# Patient Record
Sex: MALE | Race: BLACK OR AFRICAN AMERICAN
[De-identification: names, ages, dates, MRNs, and addresses within clinical notes are randomized per-mention and may not be internally consistent; named-entity substitution may affect disease eponyms.]

---

## 2019-10-10 ENCOUNTER — HOSPITAL ENCOUNTER (INPATIENT)
Dept: HOSPITAL 84 - D.ER | Age: 44
LOS: 5 days | Discharge: HOME | DRG: 193 | End: 2019-10-15
Attending: INTERNAL MEDICINE | Admitting: INTERNAL MEDICINE
Payer: MEDICARE

## 2019-10-10 VITALS
BODY MASS INDEX: 25.19 KG/M2 | WEIGHT: 170.06 LBS | HEIGHT: 69 IN | BODY MASS INDEX: 25.19 KG/M2 | HEIGHT: 69 IN | WEIGHT: 170.06 LBS | BODY MASS INDEX: 25.19 KG/M2

## 2019-10-10 VITALS — DIASTOLIC BLOOD PRESSURE: 100 MMHG | SYSTOLIC BLOOD PRESSURE: 148 MMHG

## 2019-10-10 VITALS — SYSTOLIC BLOOD PRESSURE: 147 MMHG | DIASTOLIC BLOOD PRESSURE: 106 MMHG

## 2019-10-10 VITALS — SYSTOLIC BLOOD PRESSURE: 150 MMHG | DIASTOLIC BLOOD PRESSURE: 108 MMHG

## 2019-10-10 VITALS — SYSTOLIC BLOOD PRESSURE: 147 MMHG | DIASTOLIC BLOOD PRESSURE: 95 MMHG

## 2019-10-10 VITALS — DIASTOLIC BLOOD PRESSURE: 108 MMHG | SYSTOLIC BLOOD PRESSURE: 152 MMHG

## 2019-10-10 VITALS — DIASTOLIC BLOOD PRESSURE: 108 MMHG | SYSTOLIC BLOOD PRESSURE: 150 MMHG

## 2019-10-10 VITALS — SYSTOLIC BLOOD PRESSURE: 133 MMHG | DIASTOLIC BLOOD PRESSURE: 92 MMHG

## 2019-10-10 DIAGNOSIS — I50.43: ICD-10-CM

## 2019-10-10 DIAGNOSIS — I42.7: ICD-10-CM

## 2019-10-10 DIAGNOSIS — I10: ICD-10-CM

## 2019-10-10 DIAGNOSIS — J30.9: ICD-10-CM

## 2019-10-10 DIAGNOSIS — F15.10: ICD-10-CM

## 2019-10-10 DIAGNOSIS — J44.1: ICD-10-CM

## 2019-10-10 DIAGNOSIS — G93.1: ICD-10-CM

## 2019-10-10 DIAGNOSIS — F17.213: ICD-10-CM

## 2019-10-10 DIAGNOSIS — J44.0: ICD-10-CM

## 2019-10-10 DIAGNOSIS — J96.01: ICD-10-CM

## 2019-10-10 DIAGNOSIS — J18.9: Primary | ICD-10-CM

## 2019-10-10 DIAGNOSIS — F31.9: ICD-10-CM

## 2019-10-10 LAB
ALBUMIN SERPL-MCNC: 3.4 G/DL (ref 3.4–5)
ALP SERPL-CCNC: 86 U/L (ref 46–116)
ALT SERPL-CCNC: 70 U/L (ref 10–68)
ANION GAP SERPL CALC-SCNC: 12.3 MMOL/L (ref 8–16)
APTT BLD: 27.6 SECONDS (ref 22.8–39.4)
BASOPHILS NFR BLD AUTO: 0.2 % (ref 0–2)
BILIRUB SERPL-MCNC: 0.27 MG/DL (ref 0.2–1.3)
BUN SERPL-MCNC: 10 MG/DL (ref 7–18)
CALCIUM SERPL-MCNC: 8.5 MG/DL (ref 8.5–10.1)
CHLORIDE SERPL-SCNC: 107 MMOL/L (ref 98–107)
CK MB SERPL-MCNC: 1.4 U/L (ref 0–3.6)
CK SERPL-CCNC: 191 UL (ref 21–232)
CO2 SERPL-SCNC: 25.7 MMOL/L (ref 21–32)
CREAT SERPL-MCNC: 0.9 MG/DL (ref 0.6–1.3)
D DIMER PPP FEU-MCNC: 1.02 UG/MLFEU (ref 0.2–0.54)
EOSINOPHIL NFR BLD: 1.3 % (ref 0–7)
ERYTHROCYTE [DISTWIDTH] IN BLOOD BY AUTOMATED COUNT: 16.3 % (ref 11.5–14.5)
GLOBULIN SER-MCNC: 4.5 G/L
GLUCOSE SERPL-MCNC: 96 MG/DL (ref 74–106)
HCT VFR BLD CALC: 47.4 % (ref 42–54)
HGB BLD-MCNC: 15.8 G/DL (ref 13.5–17.5)
IMM GRANULOCYTES NFR BLD: 0.3 % (ref 0–5)
INR PPP: 1.04 (ref 0.85–1.17)
LYMPHOCYTES NFR BLD AUTO: 25 % (ref 15–50)
MCH RBC QN AUTO: 28.5 PG (ref 26–34)
MCHC RBC AUTO-ENTMCNC: 33.3 G/DL (ref 31–37)
MCV RBC: 85.4 FL (ref 80–100)
MONOCYTES NFR BLD: 5.8 % (ref 2–11)
NEUTROPHILS NFR BLD AUTO: 67.4 % (ref 40–80)
NT-PROBNP SERPL-MCNC: 1063 PG/ML (ref 0–125)
OSMOLALITY SERPL CALC.SUM OF ELEC: 279 MOSM/KG (ref 275–300)
PLATELET # BLD: 191 10X3/UL (ref 130–400)
PMV BLD AUTO: 10 FL (ref 7.4–10.4)
POTASSIUM SERPL-SCNC: 4 MMOL/L (ref 3.5–5.1)
PROT SERPL-MCNC: 7.9 G/DL (ref 6.4–8.2)
PROTHROMBIN TIME: 13.1 SECONDS (ref 11.6–15)
RBC # BLD AUTO: 5.55 10X6/UL (ref 4.2–6.1)
SODIUM SERPL-SCNC: 141 MMOL/L (ref 136–145)
TROPONIN I SERPL-MCNC: < 0.017 NG/ML (ref 0–0.06)
WBC # BLD AUTO: 6.1 10X3/UL (ref 4.8–10.8)

## 2019-10-10 NOTE — NUR
PT WITH O2 SATURATIONS @ 85% ON 4L NC HUMIDIFIED. RESPIRATORY KALIA NOTIFIED
AND PLACED PT ON HIGH FLOW NC @ 7L. DR POLO NOTIFIED OF PT STATUS.
TELEPHONE ORDERS RECD ARE TO OPBTAIN BLOOD GAS. RESPIRATORY KALIA NOTIFIED OF
NEED FOR BLOOD GAS. PT IS CURRENTLY SITTING IN BED RESPIRATIONS ARE EVEN AND
FAST. PT REQUESTS TO EAT DINNER BECAUSE "MY STOMACH IS EMPTY AND THAT IS WHY I
CAN'T BREATHE". WILL CONT TO MONITOR.

## 2019-10-10 NOTE — NUR
PT ARRIVES TO ROOM VIA WHEELCHAIR ESCORTED BY HOSPITAL STAFF. PT IS
HYPERTENSIVE. SEE VS FLOWSHEET. O2 VIA NC @ 4L. PT REPORTS PAIN TO "LUNGS". PT
DENIES PRESENCE OF N/V. PT REPORTS SLIGHT TROUBLE WITH TAKING DEEP BREATHS. PT
IS AAO X 4. RESPIRATIONS ARE EVEN AND UNLABORED. BROTHER ACCOMPANIES PT TO
ROOM. PIV TO LEFT HAND IS INFUSING WITHOUT DIFFICULTY. AZITHROMYCIN INFUSING
WITHOUT DIFFICULTY. BED IS IN THE LOWEST POSITION. CALL LIGHT AND BEDSIDE
TABLE ARE WITHIN REACH. SIDE RAILS X 2. WILL CONT TO MONITOR.

## 2019-10-10 NOTE — NUR
PT WITH REQUESTS TO LEAVE AMA. PT IS BECOMING ANXIOUS AND IRRITATED AT NC AND
O2. PT ASKS "WHAT DO I HAVE TO DO TO LEAVE HERE". PT ENCOURAGED TO STAY FOR
TREATMENT. PT IS COOPERATIVE AT THIS TIME. PAGE PLACED TO PATRICIA VILLAREAL
TO NOTIFY OF PT STATE AND REQUEST.

## 2019-10-10 NOTE — NUR
PT BROTHER PULLS THIS NURSE ASIDE OUTSIDE OF PT ROOM AND REPORTS THAT THE PT
DOES HAVE A HX OF SCHIZOPHRENIA BUT DOES NOT TAKE MEDICATION FOR PSYCHIATRIC
ILLNESS.

## 2019-10-11 VITALS — SYSTOLIC BLOOD PRESSURE: 125 MMHG | DIASTOLIC BLOOD PRESSURE: 74 MMHG

## 2019-10-11 VITALS — SYSTOLIC BLOOD PRESSURE: 110 MMHG | DIASTOLIC BLOOD PRESSURE: 71 MMHG

## 2019-10-11 VITALS — SYSTOLIC BLOOD PRESSURE: 131 MMHG | DIASTOLIC BLOOD PRESSURE: 83 MMHG

## 2019-10-11 VITALS — SYSTOLIC BLOOD PRESSURE: 135 MMHG | DIASTOLIC BLOOD PRESSURE: 95 MMHG

## 2019-10-11 LAB
ANION GAP SERPL CALC-SCNC: 12 MMOL/L (ref 8–16)
APPEARANCE UR: CLEAR
BASOPHILS NFR BLD AUTO: 0 % (ref 0–2)
BILIRUB SERPL-MCNC: NEGATIVE MG/DL
BUN SERPL-MCNC: 12 MG/DL (ref 7–18)
CALCIUM SERPL-MCNC: 8 MG/DL (ref 8.5–10.1)
CHLORIDE SERPL-SCNC: 107 MMOL/L (ref 98–107)
CO2 SERPL-SCNC: 24.3 MMOL/L (ref 21–32)
COLOR UR: YELLOW
CREAT SERPL-MCNC: 0.9 MG/DL (ref 0.6–1.3)
EOSINOPHIL NFR BLD: 0 % (ref 0–7)
ERYTHROCYTE [DISTWIDTH] IN BLOOD BY AUTOMATED COUNT: 15.9 % (ref 11.5–14.5)
GLUCOSE SERPL-MCNC: NEGATIVE MG/DL
HCT VFR BLD CALC: 44.9 % (ref 42–54)
HGB BLD-MCNC: 14.7 G/DL (ref 13.5–17.5)
IMM GRANULOCYTES NFR BLD: 0.3 % (ref 0–5)
KETONES UR STRIP-MCNC: NEGATIVE MG/DL
LYMPHOCYTES NFR BLD AUTO: 7.1 % (ref 15–50)
MAGNESIUM SERPL-MCNC: 2.4 MG/DL (ref 1.8–2.4)
MCH RBC QN AUTO: 27.5 PG (ref 26–34)
MCHC RBC AUTO-ENTMCNC: 32.7 G/DL (ref 31–37)
MCV RBC: 84.1 FL (ref 80–100)
MONOCYTES NFR BLD: 1.1 % (ref 2–11)
NEUTROPHILS NFR BLD AUTO: 91.5 % (ref 40–80)
NITRITE UR-MCNC: NEGATIVE MG/ML
NT-PROBNP SERPL-MCNC: 2539 PG/ML (ref 0–125)
OSMOLALITY SERPL CALC.SUM OF ELEC: 281 MOSM/KG (ref 275–300)
PH UR STRIP: 5 [PH] (ref 5–6)
PHOSPHATE SERPL-MCNC: 3.7 MG/DL (ref 2.5–4.9)
PLATELET # BLD: 188 10X3/UL (ref 130–400)
PMV BLD AUTO: 10.1 FL (ref 7.4–10.4)
POTASSIUM SERPL-SCNC: 4.3 MMOL/L (ref 3.5–5.1)
PROT UR-MCNC: NEGATIVE MG/DL
RBC # BLD AUTO: 5.34 10X6/UL (ref 4.2–6.1)
SODIUM SERPL-SCNC: 139 MMOL/L (ref 136–145)
SP GR UR STRIP: 1.01 (ref 1–1.02)
UROBILINOGEN UR-MCNC: NORMAL MG/DL
WBC # BLD AUTO: 9.8 10X3/UL (ref 4.8–10.8)

## 2019-10-11 NOTE — NUR
ALERT AND ORIENTED X3 WITH RESP EVEN AND UNLABORED WITH HI-FLOW 02 AT 7L N/C.
BREATH SOUNDS DIMINISHED TO BLQ POSTERIOR. UP ADLIB AND ENCOURAGED TO USE CALL
LIGHT FOR ASSIT.

## 2019-10-12 VITALS — DIASTOLIC BLOOD PRESSURE: 109 MMHG | SYSTOLIC BLOOD PRESSURE: 138 MMHG

## 2019-10-12 VITALS — SYSTOLIC BLOOD PRESSURE: 126 MMHG | DIASTOLIC BLOOD PRESSURE: 77 MMHG

## 2019-10-12 VITALS — DIASTOLIC BLOOD PRESSURE: 103 MMHG | SYSTOLIC BLOOD PRESSURE: 129 MMHG

## 2019-10-12 VITALS — SYSTOLIC BLOOD PRESSURE: 140 MMHG | DIASTOLIC BLOOD PRESSURE: 92 MMHG

## 2019-10-12 VITALS — SYSTOLIC BLOOD PRESSURE: 143 MMHG | DIASTOLIC BLOOD PRESSURE: 99 MMHG

## 2019-10-12 VITALS — SYSTOLIC BLOOD PRESSURE: 142 MMHG | DIASTOLIC BLOOD PRESSURE: 100 MMHG

## 2019-10-12 VITALS — SYSTOLIC BLOOD PRESSURE: 138 MMHG | DIASTOLIC BLOOD PRESSURE: 97 MMHG

## 2019-10-12 VITALS — SYSTOLIC BLOOD PRESSURE: 145 MMHG | DIASTOLIC BLOOD PRESSURE: 88 MMHG

## 2019-10-12 VITALS — DIASTOLIC BLOOD PRESSURE: 63 MMHG | SYSTOLIC BLOOD PRESSURE: 124 MMHG

## 2019-10-12 LAB
AMPHETAMINES UR QL SCN: NEGATIVE QUAL
ANION GAP SERPL CALC-SCNC: 12.2 MMOL/L (ref 8–16)
BARBITURATES UR QL SCN: NEGATIVE QUAL
BASOPHILS NFR BLD AUTO: 0 % (ref 0–2)
BENZODIAZ UR QL SCN: NEGATIVE QUAL
BUN SERPL-MCNC: 12 MG/DL (ref 7–18)
BZE UR QL SCN: NEGATIVE QUAL
CALCIUM SERPL-MCNC: 8.3 MG/DL (ref 8.5–10.1)
CANNABINOIDS UR QL SCN: NEGATIVE QUAL
CHLORIDE SERPL-SCNC: 109 MMOL/L (ref 98–107)
CO2 SERPL-SCNC: 25.9 MMOL/L (ref 21–32)
CREAT SERPL-MCNC: 1 MG/DL (ref 0.6–1.3)
EOSINOPHIL NFR BLD: 0 % (ref 0–7)
ERYTHROCYTE [DISTWIDTH] IN BLOOD BY AUTOMATED COUNT: 16.2 % (ref 11.5–14.5)
GLUCOSE SERPL-MCNC: 146 MG/DL (ref 74–106)
HCT VFR BLD CALC: 40 % (ref 42–54)
HGB BLD-MCNC: 12.9 G/DL (ref 13.5–17.5)
IMM GRANULOCYTES NFR BLD: 0.2 % (ref 0–5)
LYMPHOCYTES NFR BLD AUTO: 5.4 % (ref 15–50)
MAGNESIUM SERPL-MCNC: 2.5 MG/DL (ref 1.8–2.4)
MCH RBC QN AUTO: 27.4 PG (ref 26–34)
MCHC RBC AUTO-ENTMCNC: 32.3 G/DL (ref 31–37)
MCV RBC: 85.1 FL (ref 80–100)
MONOCYTES NFR BLD: 4.6 % (ref 2–11)
NEUTROPHILS NFR BLD AUTO: 89.8 % (ref 40–80)
OPIATES UR QL SCN: NEGATIVE QUAL
OSMOLALITY SERPL CALC.SUM OF ELEC: 287 MOSM/KG (ref 275–300)
PCP UR QL SCN: NEGATIVE QUAL
PHOSPHATE SERPL-MCNC: 3.3 MG/DL (ref 2.5–4.9)
PLATELET # BLD: 182 10X3/UL (ref 130–400)
PMV BLD AUTO: 10.2 FL (ref 7.4–10.4)
POTASSIUM SERPL-SCNC: 4.1 MMOL/L (ref 3.5–5.1)
RBC # BLD AUTO: 4.7 10X6/UL (ref 4.2–6.1)
SODIUM SERPL-SCNC: 143 MMOL/L (ref 136–145)
WBC # BLD AUTO: 17 10X3/UL (ref 4.8–10.8)

## 2019-10-12 NOTE — NUR
BED ALARM SOUNDING PT UP HAS PULLED GOWN OFF AND CM O2 SAT OFF. PT HAD
URINATED ON SELF ASSISTED WITH COMPLETE CHG BATH AND COMPLETE LINEN CHANGE. PT
STATES "I WANT TO BE DISCHARGED INFORMED OF HOW SICK HE HAD BEEN EARLIER WITH
FLUID AND NOT BEING ABLE TO BREATHE WELL WITH FLUID THAT NEED TO BE HERE AT
THIS TIME

## 2019-10-12 NOTE — NUR
ALERT AND ORIENTED X3 WITH ANXIEY. ATIVAN GIVEN FOR ANXIETY AND EFFECTIVE.
STATES WANTS TO SMOKE BUT INSTRUCTED THAT IS WHY HE IS WEARING A NICOTINE
PATCH. FAMILY HERE AT THIS TIME. HRRR WITH NO PERIPHERAL EDEMA NOTED. O2 3L
N/C WITH NON-PRODUCTIVE COUGH. BREATH SOUNDS DIMINISHED X2 TO BLQ POSTERIOR
WITH
DYSPNEA

## 2019-10-12 NOTE — NUR
REPORT CALLED TO JAMES REGARDING PT PRESENT CONDITION. BIPAP ON AT THIS TIME
WITH CALM DEMENOR AT THIS TIME. LASIX IV GIVEN. STABLE AT TIME OF TRANSFER.
BROTHER HERE AND AWARE OF SITUATION.

## 2019-10-12 NOTE — NUR
PT FREQUENTLY REMINDED TO  KEEP OXYGEN ON PER NASAL CANULA PULSE OX DROPS TO
80/S WHEN OFF. HI-FLOW OXYGEN AT 5L N/C. NON- PRODUCTIVE COUGH NOTED. O2 SAT
93 WITH OXYGEN ON.

## 2019-10-12 NOTE — NUR
DR. POLO CALLED REGARDING NON COMPLIANCE WITH KEEPING OXYGEN ON AND
AGITIATION WITH NEW ORDER NOTED. BROTHER HERE AT THIS TIME.

## 2019-10-12 NOTE — NUR
BED ALARM SOUNDING INTO CHECK ON PT HE IS ATTEMPTING TO GET OOB TO URINATE
REEDUCATED ON BED REST AND NEED TO USE URINAL NOT TO GET OOB WITHOUT ASSIST

## 2019-10-12 NOTE — NUR
REPORT RECEIVED INITIAL ASSESSMENT COMPLETE PT LETHARGIC BUT AWAKENS TO VERBAL
STIMULI ORIENTED FOLLOWS COMMANDS. RESP EVEN FAINT CRACKLES TO BASES. BIPAP
65% SEE RT FOR NOTES. CM READING ST ALARMS ON AND AUDIBLE. BED IN LOW POSITION
SIDE RAILS UP TIMES 3 FOR BED MOBILITY AND SAFETY CL IN REACH.PTS FAMILY AT
BEDSIDE BROTHER STATES "HE DOES DRINK ALOT EVERYDAY AND USES DRUGS CRACK AND
METH" PT HAS LONG MENTAL HEALTH HISTORY WITH NONCOMPLIANCE WITH MEDS

## 2019-10-13 VITALS — DIASTOLIC BLOOD PRESSURE: 114 MMHG | SYSTOLIC BLOOD PRESSURE: 151 MMHG

## 2019-10-13 VITALS — DIASTOLIC BLOOD PRESSURE: 108 MMHG | SYSTOLIC BLOOD PRESSURE: 149 MMHG

## 2019-10-13 VITALS — SYSTOLIC BLOOD PRESSURE: 111 MMHG | DIASTOLIC BLOOD PRESSURE: 67 MMHG

## 2019-10-13 VITALS — SYSTOLIC BLOOD PRESSURE: 139 MMHG | DIASTOLIC BLOOD PRESSURE: 100 MMHG

## 2019-10-13 VITALS — DIASTOLIC BLOOD PRESSURE: 98 MMHG | SYSTOLIC BLOOD PRESSURE: 142 MMHG

## 2019-10-13 VITALS — SYSTOLIC BLOOD PRESSURE: 106 MMHG | DIASTOLIC BLOOD PRESSURE: 73 MMHG

## 2019-10-13 VITALS — DIASTOLIC BLOOD PRESSURE: 88 MMHG | SYSTOLIC BLOOD PRESSURE: 117 MMHG

## 2019-10-13 VITALS — DIASTOLIC BLOOD PRESSURE: 113 MMHG | SYSTOLIC BLOOD PRESSURE: 140 MMHG

## 2019-10-13 VITALS — DIASTOLIC BLOOD PRESSURE: 100 MMHG | SYSTOLIC BLOOD PRESSURE: 143 MMHG

## 2019-10-13 VITALS — SYSTOLIC BLOOD PRESSURE: 87 MMHG | DIASTOLIC BLOOD PRESSURE: 56 MMHG

## 2019-10-13 VITALS — SYSTOLIC BLOOD PRESSURE: 123 MMHG | DIASTOLIC BLOOD PRESSURE: 94 MMHG

## 2019-10-13 VITALS — SYSTOLIC BLOOD PRESSURE: 121 MMHG | DIASTOLIC BLOOD PRESSURE: 85 MMHG

## 2019-10-13 VITALS — DIASTOLIC BLOOD PRESSURE: 114 MMHG | SYSTOLIC BLOOD PRESSURE: 160 MMHG

## 2019-10-13 VITALS — SYSTOLIC BLOOD PRESSURE: 117 MMHG | DIASTOLIC BLOOD PRESSURE: 85 MMHG

## 2019-10-13 VITALS — SYSTOLIC BLOOD PRESSURE: 101 MMHG | DIASTOLIC BLOOD PRESSURE: 63 MMHG

## 2019-10-13 VITALS — DIASTOLIC BLOOD PRESSURE: 99 MMHG | SYSTOLIC BLOOD PRESSURE: 144 MMHG

## 2019-10-13 VITALS — SYSTOLIC BLOOD PRESSURE: 125 MMHG | DIASTOLIC BLOOD PRESSURE: 83 MMHG

## 2019-10-13 VITALS — SYSTOLIC BLOOD PRESSURE: 137 MMHG | DIASTOLIC BLOOD PRESSURE: 90 MMHG

## 2019-10-13 VITALS — SYSTOLIC BLOOD PRESSURE: 95 MMHG | DIASTOLIC BLOOD PRESSURE: 63 MMHG

## 2019-10-13 VITALS — SYSTOLIC BLOOD PRESSURE: 147 MMHG | DIASTOLIC BLOOD PRESSURE: 96 MMHG

## 2019-10-13 VITALS — DIASTOLIC BLOOD PRESSURE: 95 MMHG | SYSTOLIC BLOOD PRESSURE: 130 MMHG

## 2019-10-13 VITALS — DIASTOLIC BLOOD PRESSURE: 104 MMHG | SYSTOLIC BLOOD PRESSURE: 152 MMHG

## 2019-10-13 VITALS — DIASTOLIC BLOOD PRESSURE: 78 MMHG | SYSTOLIC BLOOD PRESSURE: 113 MMHG

## 2019-10-13 VITALS — SYSTOLIC BLOOD PRESSURE: 119 MMHG | DIASTOLIC BLOOD PRESSURE: 70 MMHG

## 2019-10-13 LAB
ANION GAP SERPL CALC-SCNC: 13.1 MMOL/L (ref 8–16)
BASOPHILS NFR BLD AUTO: 0.1 % (ref 0–2)
BUN SERPL-MCNC: 12 MG/DL (ref 7–18)
CALCIUM SERPL-MCNC: 8.7 MG/DL (ref 8.5–10.1)
CHLORIDE SERPL-SCNC: 103 MMOL/L (ref 98–107)
CO2 SERPL-SCNC: 29 MMOL/L (ref 21–32)
CREAT SERPL-MCNC: 0.9 MG/DL (ref 0.6–1.3)
EOSINOPHIL NFR BLD: 0.1 % (ref 0–7)
ERYTHROCYTE [DISTWIDTH] IN BLOOD BY AUTOMATED COUNT: 16.6 % (ref 11.5–14.5)
GLUCOSE SERPL-MCNC: 115 MG/DL (ref 74–106)
HCT VFR BLD CALC: 44 % (ref 42–54)
HGB BLD-MCNC: 14.3 G/DL (ref 13.5–17.5)
IMM GRANULOCYTES NFR BLD: 0.3 % (ref 0–5)
LYMPHOCYTES NFR BLD AUTO: 14.1 % (ref 15–50)
MCH RBC QN AUTO: 27.7 PG (ref 26–34)
MCHC RBC AUTO-ENTMCNC: 32.5 G/DL (ref 31–37)
MCV RBC: 85.3 FL (ref 80–100)
MONOCYTES NFR BLD: 5.8 % (ref 2–11)
NEUTROPHILS NFR BLD AUTO: 79.6 % (ref 40–80)
OSMOLALITY SERPL CALC.SUM OF ELEC: 281 MOSM/KG (ref 275–300)
PLATELET # BLD: 205 10X3/UL (ref 130–400)
PMV BLD AUTO: 10.7 FL (ref 7.4–10.4)
POTASSIUM SERPL-SCNC: 4.1 MMOL/L (ref 3.5–5.1)
RBC # BLD AUTO: 5.16 10X6/UL (ref 4.2–6.1)
SODIUM SERPL-SCNC: 141 MMOL/L (ref 136–145)
WBC # BLD AUTO: 14.7 10X3/UL (ref 4.8–10.8)

## 2019-10-13 NOTE — NUR
REASSESSMENT COMPLETED. VSS. IV 22 GA TO RIGHT WRIST STARTED X 1 ATTEMPT,
POSITIVE BLOOD RETURN AND FLUSHES EASILY, DOBUTAMINE GTT STARTED AT 5
MCG/KG/MIN PER ORDER. PATIENT RESTING QUIETLY.

## 2019-10-13 NOTE — NUR
CONTINENT VOID VIA URINAL NOTED AT THIS TIME. 375ML YELLOW URINE NOTED. NO
ACUTE DISTRESS NOTED. VSS. WILL CONTINUE PLAN OF CARE.

## 2019-10-13 NOTE — NUR
RECEIVED BEDSIDE REPORT AND ASSUMED CARE OF PATIENT. PATIENT RESTING QUIETLY
WITH EYES CLOSED VSS, BROTHER AT BEDSIDE. IV 20 GA TO LEFT HAND, FLUSHES
EASILY WITH POSITIVE BLOOD RETURN, NSL. CM - , ST, BBS CLEAR AND EQUAL
DIMINISHED IN BASES, RR - 26, SPO2 - 92% ON 2 LPM O2 VIA NC. HEAD TO TOE
ASSESSMENT COMLETED.

## 2019-10-13 NOTE — NUR
REPORT RECEIVED, PT RESTING IN BED, NO ACUTE DISTRESS NOTED. AROUSES TO VOICE,
LETHARGIC, BUT OTHERWISE ALERT AND ORIENTED. PT ON 4L VIA NC, PIV IN RIGHT
HAND INFUSING, SEE FLOWSHEET, ASSESSMENT COMPLETED, SEE FLOWSHEET. NO ACUTE
DISTRESS NOTED AT THIS TIME.

## 2019-10-13 NOTE — NUR
REPORT RECIEVED ON PT AT THIS TIME. VSS. PT LYING IN BED RESTING, RESPIRATIONS
STEADY AND UNLABORED. AWAKENS EASILY WHEN SPOKEN TO. INDEPENDENT IN BED. WILL
CONTINUE PLAN OF CARE.

## 2019-10-13 NOTE — NUR
LYING IN BED RESTING AT THIS TIME. RESPIRATIONS STEADY AND UNLABORED RATE.
AWAKENS EASILY WHEN SPOKEN TO. WILL CONTINUE PLAN OF CARE.

## 2019-10-14 VITALS — SYSTOLIC BLOOD PRESSURE: 121 MMHG | DIASTOLIC BLOOD PRESSURE: 94 MMHG

## 2019-10-14 VITALS — SYSTOLIC BLOOD PRESSURE: 97 MMHG | DIASTOLIC BLOOD PRESSURE: 58 MMHG

## 2019-10-14 VITALS — SYSTOLIC BLOOD PRESSURE: 83 MMHG | DIASTOLIC BLOOD PRESSURE: 54 MMHG

## 2019-10-14 VITALS — DIASTOLIC BLOOD PRESSURE: 77 MMHG | SYSTOLIC BLOOD PRESSURE: 104 MMHG

## 2019-10-14 VITALS — DIASTOLIC BLOOD PRESSURE: 85 MMHG | SYSTOLIC BLOOD PRESSURE: 113 MMHG

## 2019-10-14 VITALS — DIASTOLIC BLOOD PRESSURE: 67 MMHG | SYSTOLIC BLOOD PRESSURE: 115 MMHG

## 2019-10-14 VITALS — SYSTOLIC BLOOD PRESSURE: 107 MMHG | DIASTOLIC BLOOD PRESSURE: 83 MMHG

## 2019-10-14 VITALS — DIASTOLIC BLOOD PRESSURE: 69 MMHG | SYSTOLIC BLOOD PRESSURE: 108 MMHG

## 2019-10-14 VITALS — SYSTOLIC BLOOD PRESSURE: 128 MMHG | DIASTOLIC BLOOD PRESSURE: 71 MMHG

## 2019-10-14 VITALS — DIASTOLIC BLOOD PRESSURE: 71 MMHG | SYSTOLIC BLOOD PRESSURE: 102 MMHG

## 2019-10-14 VITALS — SYSTOLIC BLOOD PRESSURE: 96 MMHG | DIASTOLIC BLOOD PRESSURE: 60 MMHG

## 2019-10-14 VITALS — SYSTOLIC BLOOD PRESSURE: 99 MMHG | DIASTOLIC BLOOD PRESSURE: 73 MMHG

## 2019-10-14 VITALS — DIASTOLIC BLOOD PRESSURE: 74 MMHG | SYSTOLIC BLOOD PRESSURE: 111 MMHG

## 2019-10-14 VITALS — SYSTOLIC BLOOD PRESSURE: 130 MMHG | DIASTOLIC BLOOD PRESSURE: 86 MMHG

## 2019-10-14 VITALS — SYSTOLIC BLOOD PRESSURE: 128 MMHG | DIASTOLIC BLOOD PRESSURE: 91 MMHG

## 2019-10-14 VITALS — DIASTOLIC BLOOD PRESSURE: 91 MMHG | SYSTOLIC BLOOD PRESSURE: 125 MMHG

## 2019-10-14 VITALS — SYSTOLIC BLOOD PRESSURE: 113 MMHG | DIASTOLIC BLOOD PRESSURE: 92 MMHG

## 2019-10-14 VITALS — SYSTOLIC BLOOD PRESSURE: 121 MMHG | DIASTOLIC BLOOD PRESSURE: 91 MMHG

## 2019-10-14 VITALS — DIASTOLIC BLOOD PRESSURE: 68 MMHG | SYSTOLIC BLOOD PRESSURE: 109 MMHG

## 2019-10-14 VITALS — SYSTOLIC BLOOD PRESSURE: 115 MMHG | DIASTOLIC BLOOD PRESSURE: 83 MMHG

## 2019-10-14 VITALS — DIASTOLIC BLOOD PRESSURE: 61 MMHG | SYSTOLIC BLOOD PRESSURE: 106 MMHG

## 2019-10-14 VITALS — DIASTOLIC BLOOD PRESSURE: 84 MMHG | SYSTOLIC BLOOD PRESSURE: 109 MMHG

## 2019-10-14 VITALS — SYSTOLIC BLOOD PRESSURE: 127 MMHG | DIASTOLIC BLOOD PRESSURE: 98 MMHG

## 2019-10-14 VITALS — DIASTOLIC BLOOD PRESSURE: 93 MMHG | SYSTOLIC BLOOD PRESSURE: 109 MMHG

## 2019-10-14 LAB
ANION GAP SERPL CALC-SCNC: 11.2 MMOL/L (ref 8–16)
BASOPHILS NFR BLD AUTO: 0.1 % (ref 0–2)
BUN SERPL-MCNC: 18 MG/DL (ref 7–18)
CALCIUM SERPL-MCNC: 8.8 MG/DL (ref 8.5–10.1)
CHLORIDE SERPL-SCNC: 100 MMOL/L (ref 98–107)
CO2 SERPL-SCNC: 28.9 MMOL/L (ref 21–32)
CREAT SERPL-MCNC: 1 MG/DL (ref 0.6–1.3)
EOSINOPHIL NFR BLD: 2 % (ref 0–7)
ERYTHROCYTE [DISTWIDTH] IN BLOOD BY AUTOMATED COUNT: 16.2 % (ref 11.5–14.5)
GLUCOSE SERPL-MCNC: 116 MG/DL (ref 74–106)
HCT VFR BLD CALC: 44.9 % (ref 42–54)
HGB BLD-MCNC: 15 G/DL (ref 13.5–17.5)
IMM GRANULOCYTES NFR BLD: 0.3 % (ref 0–5)
LYMPHOCYTES NFR BLD AUTO: 30.7 % (ref 15–50)
MAGNESIUM SERPL-MCNC: 2.2 MG/DL (ref 1.8–2.4)
MCH RBC QN AUTO: 28.1 PG (ref 26–34)
MCHC RBC AUTO-ENTMCNC: 33.4 G/DL (ref 31–37)
MCV RBC: 84.1 FL (ref 80–100)
MONOCYTES NFR BLD: 9.8 % (ref 2–11)
NEUTROPHILS NFR BLD AUTO: 57.1 % (ref 40–80)
OSMOLALITY SERPL CALC.SUM OF ELEC: 276 MOSM/KG (ref 275–300)
PHOSPHATE SERPL-MCNC: 4.5 MG/DL (ref 2.5–4.9)
PLATELET # BLD: 196 10X3/UL (ref 130–400)
PMV BLD AUTO: 10.7 FL (ref 7.4–10.4)
POTASSIUM SERPL-SCNC: 3.1 MMOL/L (ref 3.5–5.1)
RBC # BLD AUTO: 5.34 10X6/UL (ref 4.2–6.1)
SODIUM SERPL-SCNC: 137 MMOL/L (ref 136–145)
TROPONIN I SERPL-MCNC: < 0.017 NG/ML (ref 0–0.06)
WBC # BLD AUTO: 7.4 10X3/UL (ref 4.8–10.8)

## 2019-10-14 NOTE — MORECARE
CASE MANAGEMENT DISCHARGE SUMMARY
 
 
PATIENT: RYANNE BOGGS                   UNIT: [id number]
ACCOUNT#: [id number]                       ADM DATE: 10/10/19
AGE: 43     : [birth year]  SEX: M            ROOM/BED: D.2311    
AUTHOR: CUONG,DOC                             PHYSICIAN:                               
 
REFERRING PHYSICIAN: JAMIE POLO MD               
DATE OF SERVICE: 10/14/19
Discharge Plan
 
 
Patient Name: RYANNE BOGGS
Facility: [facility]:[place]
Encounter #: [id number]
Medical Record #: [id number]
: 1975
Planned Disposition: Home
Anticipated Discharge Date: 
 
Discharge Date: 
Expected LOS: 
Initial Reviewer: [email or web address]
Initial Review Date: 10/14/2019
Generated: 10/14/19   4:03 pm 
Comments
 
DCP- Discharge Planning
 
Updated by [email or web address]: Cheryl Espinoza on 10/14/19   1:56 pm CT
Patient Name: RYANNE BOGGS                                     
Admission Status: ER   
Accout number: [id number]                              
Admission Date: 10-   
: 1975                                                        
Admission Diagnosis:   
Attending: JAMIE POLO                                                
Current LOS:  4   
  
Anticipated DC Date:    
Planned Disposition: Home   
Primary Insurance: MEDICARE A & B   
  
  
Discharge Planning Comments: CM met with patient at bedside after explaining 
CM role and obtaining verbal consent. Patient lives at home alone where he is 
independent with his care and plans to return there upon discharge.  Patient 
feels this would be a safe discharge.  CM discussed availability / needs of 
home health and medical equipment.  Patient denies any discharge needs at 
 
this time. Patient states he will have his family drive him home upon 
discharge.  CM will continue to follow and assist as needed with discharge 
planning / needs.  
  
  
  
  
  
  
: Cheryl Espinoza
 DCPIA - Discharge Planning Initial Assessment
 
Updated by [id number]: Cheryl Espinoza on 10/14/19   2:54 pm
*  Is the patient Alert and Oriented?
Yes
*  How many steps to enter\exit or inside your home?
 
*  PCP
no PCP
*  Pharmacy
[facility]
*  Preadmission Environment
Home Alone
*  ADLs
Independent
*  Equipment
None
*  List name and contact numbers for known caregivers / representatives who 
currently or will assist patient after discharge:
KALIA ALVAREZ - [phone]
*  Verbal permission to speak to the caregivers and representatives has been 
obtained from the patient.
N/A
*  Community resources currently utilized
None
*  Additional services required to return to the preadmission environment?
No
*  Can the patient safely return to the preadmission environment?
Yes
*  Has this patient been hospitalized within the prior 30 days at any 
hospital?
No
 
 
 
 
 
Coverage Notice
 
Reviewer: [email or web address] Leigh Ann Boyle
 
Notice Issued Date-Time: 10/11/2019  16:53
 
Notice Type: IM Discharge Notice
 
Notice Delivered To: Patient
Relationship to Patient: 
Representative Name: 
 
Delivery Method: HAND - Hand Delivered
Roseann Days:
Prior Verbal Notification: 
 
Recipient Understood Notice: Yes
Recipient Signature: Yes
Med Rec Note Co-signed by Attending:
 
Coverage Notice Comment:  
Patient Name: RYANNE BOGGS
Encounter #: [id number]
Page [id number]
 
 
 
 
 
Electronically Signed by BRYCE HUTCHINS on 10/14/19 at 1503
 
 
 
 
 
 
**All edits/amendments must be made on the electronic document**
 
DICTATION DATE: 10/14/19 1503     : SARWAT  10/14/19 1503     
RPT#: [id number]                                DC DATE:        
                                               STATUS: ADM IN  
[facility]
[address] [place] [address]
***END OF REPORT***

## 2019-10-14 NOTE — EC
PATIENT:RYANNE BOGGS               DATE OF SERVICE: 10/10/19
SEX: M                                  MEDICAL RECORD: [id number]
DATE OF BIRTH: [birth year]                        LOCATION:[facility]
AGE OF PATIENT: 43                             ADMISSION DATE: 10/10/19
 
REFERRING PHYSICIAN:                               
 
INTERPRETING PHYSICIAN: BROOK CHANEL MD             
 
 
 
                             ECHOCARDIOGRAM REPORT
  ECHO CHARGES 4               ECHO COMPLETE                 Date: 10/11/19
 
 
 
CLINICAL DIAGNOSIS: ELEVATED BNP/TACHYCARDIA/     
                    RESP.FAILURE/DRUG USE         
                         ECHOCARDIOGRAPHIC MEASUREMENTS
      (adult normal given)
   AC root (d.<3.7cm) 3.3  cm   LV Septum d (<1.2 cm> 1.5  cm
      Valve Excursion 2.0  cm     LV Septum (systole) 1.7  cm
Left Atria (s.<4.0cm> 4.2  cm          LVPW d(<1.2cm) 1.2  cm
        RV (d.<2.3cm) 1.7  cm           LVPW (sytole) 1.9  cm
  LV diastole(<5.6CM) 6.9  cm       MV E-F(>70mm/sec)      cm
           LV systole 5.1  cm           LVOT Diameter 1.9  cm
       MV exc.(>10mm)      cm
Est.ejection fraction (50-75%)     %
 
   DOPPLER:
     LVIT      cm/sec A 60.0 cm/sec E 144   cm/sec
       LA      cm/sec      RVSP 26.4 mmHg
     LVOT 109  cm/sec   AOP1/2T      m/s
  Asc. Ao 146  cm/sec
     RVOT 54.0 cm/sec
       RA      cm/sec
       PA 80.0 cm/sec
 AV Gradient Peak 8.5  mmHg  AV Mean 4.7  mmHg  AV Area 2.1  cm
 MV Gradient Peak 8.3  mmHg  MV Mean 3.2  mmHg  MV Area      cm
   COMMENTS:                                              
 
 
 Cardiac Sonographer: 1               AYESHA DE LEONOE            
      Cardiologist: 3          Dr. Bennett             
             TAPE# PACS           
                                       Pericardial Effusion N                        
 
 
DATE OF SERVICE:  
 
PROCEDURE:  Echocardiogram.
 
FINDINGS:
1.  Left ventricular chamber size is dilated.  Left ventricular systolic
function is markedly reduced at 20%.
2.  Left atrium, right atrium, and right ventricular chamber sizes are dilated
giving 4-chamber dilatation.
3.  Valvular structures have normal structure and motion.
 
 
 
ECHOCARDIOGRAM REPORT                          [id number]    RYANNE BOGGS       
 
 
4.  Doppler interrogation reveals moderate mitral regurgitation, mild tricuspid
regurgitation, no other valvular insufficiency or stenosis.  Pulmonary systolic
pressure is estimated at 26 mmHg.
5.  No evidence of pericardial effusion or left ventricular thrombus.
 
TRANSINT:[id number] Voice Confirmation ID: [id number] DOCUMENT ID: [id number]
                                           
                                           BROOK CHANEL MD             
 
 
 
Electronically Signed by BROOK CHANEL on 10/14/19 at 1110
 
 
 
 
 
 
 
 
 
 
 
 
 
 
 
 
 
 
 
 
 
 
 
 
 
 
 
 
 
 
 
 
 
 
CC:                                                             [id number]
DICTATION DATE: 10/11/19 1316     :     10/11/19 1340      ADM IN  
                                                                              
[facility]                                          
[address] [id number]

## 2019-10-14 NOTE — NUR
REASSESSMENT COMPLETE PER FLOW SHEET. VSS. NO NEW CHANGES PT RESTING
COMFORTABLY WILL CONTINUE TO MONITOR

## 2019-10-14 NOTE — NUR
REPORT RECEIVED. ASSESSMENT COMPLETE PER FLOW SHEET. VSS. PT RSTING
COMFORTABLY DENIES NEEDS WILL CONTINUE TO MONITOR

## 2019-10-14 NOTE — NUR
PT RESTING IN BED, ON 1L O2 VIA NC, REPORTS "ITCHING" ON ARMS AND ON BODY, NO
SIGNS OF RASH/REDNESS. WILL CONTINUE TO MONITOR.

## 2019-10-14 NOTE — NUR
BEDSIDE REPORT AND SHIFT ASSESSMENT COMPLETE. VSS, NO SIGNS OF ACUTE DISTRESS
NOTED. DENIES NEEDS AT THIS TIME, CALL LIGHT IN REACH. WILL MONITOR.

## 2019-10-14 NOTE — NUR
REASSESSMENT COMPLETE, SEE FLOWSHEET. VSS, NO SIGNS OF ACUTE DISTRESS NOTED.
MEDS GIVEN PER MAR. PT DENIES ANY NEEDS AT THIS TIME, WILL MONITOR.

## 2019-10-15 VITALS — DIASTOLIC BLOOD PRESSURE: 96 MMHG | SYSTOLIC BLOOD PRESSURE: 124 MMHG

## 2019-10-15 VITALS — SYSTOLIC BLOOD PRESSURE: 112 MMHG | DIASTOLIC BLOOD PRESSURE: 79 MMHG

## 2019-10-15 VITALS — DIASTOLIC BLOOD PRESSURE: 75 MMHG | SYSTOLIC BLOOD PRESSURE: 107 MMHG

## 2019-10-15 VITALS — SYSTOLIC BLOOD PRESSURE: 99 MMHG | DIASTOLIC BLOOD PRESSURE: 69 MMHG

## 2019-10-15 VITALS — DIASTOLIC BLOOD PRESSURE: 82 MMHG | SYSTOLIC BLOOD PRESSURE: 112 MMHG

## 2019-10-15 VITALS — DIASTOLIC BLOOD PRESSURE: 78 MMHG | SYSTOLIC BLOOD PRESSURE: 113 MMHG

## 2019-10-15 VITALS — SYSTOLIC BLOOD PRESSURE: 107 MMHG | DIASTOLIC BLOOD PRESSURE: 71 MMHG

## 2019-10-15 LAB
ANION GAP SERPL CALC-SCNC: 12.8 MMOL/L (ref 8–16)
BASOPHILS NFR BLD AUTO: 0.3 % (ref 0–2)
BUN SERPL-MCNC: 20 MG/DL (ref 7–18)
CALCIUM SERPL-MCNC: 8.9 MG/DL (ref 8.5–10.1)
CHLORIDE SERPL-SCNC: 100 MMOL/L (ref 98–107)
CO2 SERPL-SCNC: 29.6 MMOL/L (ref 21–32)
CREAT SERPL-MCNC: 1.4 MG/DL (ref 0.6–1.3)
EOSINOPHIL NFR BLD: 3.4 % (ref 0–7)
ERYTHROCYTE [DISTWIDTH] IN BLOOD BY AUTOMATED COUNT: 15.9 % (ref 11.5–14.5)
GLUCOSE SERPL-MCNC: 109 MG/DL (ref 74–106)
HCT VFR BLD CALC: 46.2 % (ref 42–54)
HGB BLD-MCNC: 15.3 G/DL (ref 13.5–17.5)
IMM GRANULOCYTES NFR BLD: 0.5 % (ref 0–5)
LYMPHOCYTES NFR BLD AUTO: 37.8 % (ref 15–50)
MCH RBC QN AUTO: 27.7 PG (ref 26–34)
MCHC RBC AUTO-ENTMCNC: 33.1 G/DL (ref 31–37)
MCV RBC: 83.7 FL (ref 80–100)
MONOCYTES NFR BLD: 11.7 % (ref 2–11)
NEUTROPHILS NFR BLD AUTO: 46.3 % (ref 40–80)
OSMOLALITY SERPL CALC.SUM OF ELEC: 281 MOSM/KG (ref 275–300)
PLATELET # BLD: 214 10X3/UL (ref 130–400)
PMV BLD AUTO: 10.9 FL (ref 7.4–10.4)
POTASSIUM SERPL-SCNC: 3.4 MMOL/L (ref 3.5–5.1)
RBC # BLD AUTO: 5.52 10X6/UL (ref 4.2–6.1)
SODIUM SERPL-SCNC: 139 MMOL/L (ref 136–145)
WBC # BLD AUTO: 5.8 10X3/UL (ref 4.8–10.8)

## 2019-10-15 NOTE — NUR
ODELL LO RETURNED PAGE - NOTIFIED OF PT WANTING TO LEAVE.  ORDER FOR
ONE TIME ATIVAN PO - FOR AGITATION.  NOTIFIED PT HE CANNOT BE DISCHARGED UNTIL
SEEN BY MD.  PT REFUSED TAKING ATIVAN AT THIS TIME.

## 2019-10-15 NOTE — NUR
DR SANSON CALLED GIVEN UPDATE PT ADAMENT ON LEAVING AMA AT THIS TIME
UNDERSTANDS RISKS INCLUDING FLUID OVERLOAD AND DEATH. STATES UNDERSTANDING TO
COME BACK TO ER IF FEELING SOB. STATED UNDERSTANDING REGUARDING INSURANCE WILL
NOT COVER THIS STAY. PT ALERT ORIENTED X4. ALL QUESTIONS ANSWERED, SPOKE TO PT
AND FAMILY IN DEPTH REGUARDING CURRENT STAY AND DIAGNOSIS. PT CHOSING TO LEAVE
AMA AT THIS TIME. PAPERWORK SIGNED DR BARDALES. WITNESSED BY ARGELIA MONTILLA.

## 2019-10-15 NOTE — NUR
REASSESSMENT COMPLETE, SEE FLOWSHEET. PT SLEEPING, BROTHER AT BEDSIDE. VSS.
CALL LIGHT IN REACH, WILL MONITOR.

## 2019-10-15 NOTE — NUR
PT SITTING AT BS, IV OUT, NO BLEEDING NOTED, DSG APPLIED.  BROTHER AT BS,
"TRYING TO GET HIM TO STAY".  PT WANTS TO LEAVE.  EXPLAINED RELEVANCE OF
DOBUTAMINE GTT THAT WAS INFUSING AND NEED TO MONITOR VS.  PT BECOMING
AGITATED, "IM A GROWN MAN AND I WANT TO LEAVE".  DR. POLO PAGED

## 2019-10-15 NOTE — NUR
PT ASKING IF I COULD TAKE HIS IV OUT, I TOLD HIM NO. HE THEN ASKED IF HE WAS
GOING TO GET TO GO HOME TOMORROW, I TOLD HIM WE WOULD KNOW MORE IN THE
MORNING. HE SAID THAT HE DOES NOT WANT ANY OF HIS FAMILY TO KNOW WHEN HE WILL
BE DISCHARGED. I ASKED WHY AND HE SHRUGGED HIS SHOULDERS AND SAID "THEY JUST
DONT NEED TO KNOW"

## 2019-10-15 NOTE — MORECARE
CASE MANAGEMENT DISCHARGE SUMMARY
 
 
PATIENT: RYANNE BOGGS                   UNIT: [id number]
ACCOUNT#: [id number]                       ADM DATE: 10/10/19
AGE: 43     : [birth year]  SEX: M            ROOM/BED: D.2311    
AUTHOR: CUONG,DOC                             PHYSICIAN:                               
 
REFERRING PHYSICIAN: JAMIE POLO MD               
DATE OF SERVICE: 10/15/19
Discharge Plan
 
 
Patient Name: RYANNE BOGGS
Facility: [facility]:[place]
Encounter #: [id number]
Medical Record #: [id number]
: 1975
Planned Disposition: Home
Anticipated Discharge Date: 
 
Discharge Date: 10/15/2019
Expected LOS: 
Initial Reviewer: [email or web address]
Initial Review Date: 10/14/2019
Generated: 10/15/19   9:57 am 
Comments
 
DCP- Discharge Planning
 
Updated by [email or web address]: Cheryl Espinoza on 10/14/19   1:56 pm CT
Patient Name: RYANNE BOGGS                                     
Admission Status: ER   
Accout number: [id number]                              
Admission Date: 10-   
: 1975                                                        
Admission Diagnosis:   
Attending: JAMIE POLO                                                
Current LOS:  4   
  
Anticipated DC Date:    
Planned Disposition: Home   
Primary Insurance: MEDICARE A & B   
  
  
Discharge Planning Comments: CM met with patient at bedside after explaining 
CM role and obtaining verbal consent. Patient lives at home alone where he is 
independent with his care and plans to return there upon discharge.  Patient 
feels this would be a safe discharge.  CM discussed availability / needs of 
home health and medical equipment.  Patient denies any discharge needs at 
 
this time. Patient states he will have his family drive him home upon 
discharge.  CM will continue to follow and assist as needed with discharge 
planning / needs.  
  
  
  
  
  
  
: Cheryl Espinoza
 DCPIA - Discharge Planning Initial Assessment
 
Updated by [id number]: Cheryl Espinoza on 10/14/19   2:54 pm
*  Is the patient Alert and Oriented?
Yes
*  How many steps to enter\exit or inside your home?
 
*  PCP
no PCP
*  Pharmacy
[facility]
*  Preadmission Environment
Home Alone
*  ADLs
Independent
*  Equipment
None
*  List name and contact numbers for known caregivers / representatives who 
currently or will assist patient after discharge:
KALIA WHALEY   - [phone]
*  Verbal permission to speak to the caregivers and representatives has been 
obtained from the patient.
N/A
*  Community resources currently utilized
None
*  Additional services required to return to the preadmission environment?
No
*  Can the patient safely return to the preadmission environment?
Yes
*  Has this patient been hospitalized within the prior 30 days at any 
hospital?
No
 
 
 
 
 
Coverage Notice
 
Reviewer: [id number] Leigh Ann Boyle
 
Notice Issued Date-Time: 10/11/2019  16:53
 
Notice Type: IM Discharge Notice
 
Notice Delivered To: Patient
Relationship to Patient: 
Representative Name: 
 
Delivery Method: HAND - Hand Delivered
Roseann Days:
Prior Verbal Notification: 
 
Recipient Understood Notice: Yes
Recipient Signature: Yes
Med Rec Note Co-signed by Attending:
 
Coverage Notice Comment:  
 
Last DP export: 10/14/19   2:03 
Patient Name: RYANNE BOGGS
Encounter #: [id number]
Page [phone]
 
 
 
 
 
Electronically Signed by BRYCE HUTCHINS on 10/15/19 at 0857
 
 
 
 
 
 
**All edits/amendments must be made on the electronic document**
 
DICTATION DATE: 10/15/19 0857     : SARWAT  10/15/19 0857     
RPT#: [id number]                                DC DATE:10/15/19
                                               STATUS: DIS IN  
[facility]
[address] [place] [address]
***END OF REPORT***

## 2019-10-15 NOTE — MORECARE
CASE MANAGEMENT DISCHARGE SUMMARY
 
 
PATIENT: RYANNE BOGGS                   UNIT: [id number]
ACCOUNT#: [id number]                       ADM DATE: 10/10/19
AGE: 43     : [birth year]  SEX: M            ROOM/BED: D.2311    
AUTHOR: CUONG,DOC                             PHYSICIAN:                               
 
REFERRING PHYSICIAN: JAMIE POLO MD               
DATE OF SERVICE: 10/15/19
Discharge Plan
 
 
Patient Name: RYANNE BOGGS
Facility: [facility]:[place]
Encounter #: [id number]
Medical Record #: [id number]
: 1975
Planned Disposition: Home
Anticipated Discharge Date: 
 
Discharge Date: 10/15/2019
Expected LOS: 
Initial Reviewer: [email or web address]
Initial Review Date: 10/14/2019
Generated: 10/15/19  10:04 am 
Comments
 
DCP- Discharge Planning
 
Updated by [email or web address]: Cheryl Esipnoza on 10/14/19   1:56 pm CT
Patient Name: RYANNE BOGGS                                     
Admission Status: ER   
Accout number: [id number]                              
Admission Date: 10-   
: 1975                                                        
Admission Diagnosis:   
Attending: JAMIE POLO                                                
Current LOS:  4   
  
Anticipated DC Date:    
Planned Disposition: Home   
Primary Insurance: MEDICARE A & B   
  
  
Discharge Planning Comments: CM met with patient at bedside after explaining 
CM role and obtaining verbal consent. Patient lives at home alone where he is 
independent with his care and plans to return there upon discharge.  Patient 
feels this would be a safe discharge.  CM discussed availability / needs of 
home health and medical equipment.  Patient denies any discharge needs at 
 
this time. Patient states he will have his family drive him home upon 
discharge.  CM will continue to follow and assist as needed with discharge 
planning / needs.  
  
  
  
  
  
  
: Cheryl Espinoza
 DCPIA - Discharge Planning Initial Assessment
 
Updated by [email or web address]: Cheryl Espinoza on 10/14/19   2:54 pm
*  Is the patient Alert and Oriented?
Yes
*  How many steps to enter\exit or inside your home?
 
*  PCP
no PCP
*  Pharmacy
[facility]
*  Preadmission Environment
Home Alone
*  ADLs
Independent
*  Equipment
None
*  List name and contact numbers for known caregivers / representatives who 
currently or will assist patient after discharge:
KALIA WHALEY   - [phone]
*  Verbal permission to speak to the caregivers and representatives has been 
obtained from the patient.
N/A
*  Community resources currently utilized
None
*  Additional services required to return to the preadmission environment?
No
*  Can the patient safely return to the preadmission environment?
Yes
*  Has this patient been hospitalized within the prior 30 days at any 
hospital?
No
 
 
 
 
 
Coverage Notice
 
Reviewer: [email or web address] Leigh Ann Boyle
 
Notice Issued Date-Time: 10/11/2019  16:53
 
Notice Type: IM Discharge Notice
 
Notice Delivered To: Patient
Relationship to Patient: 
Representative Name: 
 
Delivery Method: HAND - Hand Delivered
Roseann Days:
Prior Verbal Notification: 
 
Recipient Understood Notice: Yes
Recipient Signature: Yes
Med Rec Note Co-signed by Attending:
 
Coverage Notice Comment:  
 
Last DP export: 10/15/19   7:57 
Patient Name: RYANNE BOGGS
Encounter #: [id number]
Page [phone]
 
 
 
 
 
Electronically Signed by BRYCE HUTCHINS on 10/15/19 at 0905
 
 
 
 
 
 
**All edits/amendments must be made on the electronic document**
 
DICTATION DATE: 10/15/19 0904     : SARWAT  10/15/19 0904     
RPT#: [id number]                                DC DATE:10/15/19
                                               STATUS: DIS IN  
[facility]
[address] [place] [address]
***END OF REPORT***

## 2019-10-16 LAB — M PNEUMO IGG SER IA-ACNC: 715 U/ML (ref 0–99)

## 2019-12-17 ENCOUNTER — HOSPITAL ENCOUNTER (INPATIENT)
Dept: HOSPITAL 84 - D.ER | Age: 44
LOS: 2 days | Discharge: HOME | DRG: 291 | End: 2019-12-19
Attending: INTERNAL MEDICINE | Admitting: INTERNAL MEDICINE
Payer: MEDICARE

## 2019-12-17 VITALS — DIASTOLIC BLOOD PRESSURE: 87 MMHG | SYSTOLIC BLOOD PRESSURE: 122 MMHG

## 2019-12-17 VITALS — SYSTOLIC BLOOD PRESSURE: 140 MMHG | DIASTOLIC BLOOD PRESSURE: 101 MMHG

## 2019-12-17 VITALS — SYSTOLIC BLOOD PRESSURE: 137 MMHG | DIASTOLIC BLOOD PRESSURE: 95 MMHG

## 2019-12-17 VITALS — WEIGHT: 178.37 LBS | BODY MASS INDEX: 26.42 KG/M2 | HEIGHT: 69 IN | BODY MASS INDEX: 26.42 KG/M2

## 2019-12-17 VITALS — SYSTOLIC BLOOD PRESSURE: 116 MMHG | DIASTOLIC BLOOD PRESSURE: 73 MMHG

## 2019-12-17 DIAGNOSIS — Z91.19: ICD-10-CM

## 2019-12-17 DIAGNOSIS — J96.01: ICD-10-CM

## 2019-12-17 DIAGNOSIS — I08.1: ICD-10-CM

## 2019-12-17 DIAGNOSIS — F17.213: ICD-10-CM

## 2019-12-17 DIAGNOSIS — I42.9: ICD-10-CM

## 2019-12-17 DIAGNOSIS — I11.0: Primary | ICD-10-CM

## 2019-12-17 DIAGNOSIS — E87.1: ICD-10-CM

## 2019-12-17 DIAGNOSIS — I50.43: ICD-10-CM

## 2019-12-17 LAB
ALBUMIN SERPL-MCNC: 2.9 G/DL (ref 3.4–5)
ALP SERPL-CCNC: 107 U/L (ref 46–116)
ALT SERPL-CCNC: 92 U/L (ref 10–68)
AMPHETAMINES UR QL SCN: NEGATIVE QUAL
ANION GAP SERPL CALC-SCNC: 14.3 MMOL/L (ref 8–16)
APPEARANCE UR: CLEAR
APTT BLD: 35.8 SECONDS (ref 22.8–39.4)
BARBITURATES UR QL SCN: NEGATIVE QUAL
BASOPHILS NFR BLD AUTO: 0.4 % (ref 0–2)
BENZODIAZ UR QL SCN: NEGATIVE QUAL
BILIRUB SERPL-MCNC: 0.88 MG/DL (ref 0.2–1.3)
BILIRUB SERPL-MCNC: NEGATIVE MG/DL
BUN SERPL-MCNC: 9 MG/DL (ref 7–18)
BZE UR QL SCN: NEGATIVE QUAL
CALCIUM SERPL-MCNC: 8.9 MG/DL (ref 8.5–10.1)
CANNABINOIDS UR QL SCN: NEGATIVE QUAL
CHLORIDE SERPL-SCNC: 101 MMOL/L (ref 98–107)
CK MB SERPL-MCNC: 1.8 U/L (ref 0–3.6)
CK SERPL-CCNC: 159 UL (ref 21–232)
CO2 SERPL-SCNC: 23.7 MMOL/L (ref 21–32)
COLOR UR: YELLOW
CREAT SERPL-MCNC: 1.1 MG/DL (ref 0.6–1.3)
EOSINOPHIL NFR BLD: 0.9 % (ref 0–7)
ERYTHROCYTE [DISTWIDTH] IN BLOOD BY AUTOMATED COUNT: 15.3 % (ref 11.5–14.5)
GLOBULIN SER-MCNC: 4.8 G/L
GLUCOSE SERPL-MCNC: 172 MG/DL (ref 74–106)
GLUCOSE SERPL-MCNC: NEGATIVE MG/DL
HCT VFR BLD CALC: 45 % (ref 42–54)
HGB BLD-MCNC: 14.8 G/DL (ref 13.5–17.5)
IMM GRANULOCYTES NFR BLD: 0.3 % (ref 0–5)
INR PPP: 1.19 (ref 0.85–1.17)
KETONES UR STRIP-MCNC: NEGATIVE MG/DL
LYMPHOCYTES NFR BLD AUTO: 18.4 % (ref 15–50)
MCH RBC QN AUTO: 27.4 PG (ref 26–34)
MCHC RBC AUTO-ENTMCNC: 32.9 G/DL (ref 31–37)
MCV RBC: 83.3 FL (ref 80–100)
MONOCYTES NFR BLD: 10.5 % (ref 2–11)
NEUTROPHILS NFR BLD AUTO: 69.5 % (ref 40–80)
NITRITE UR-MCNC: NEGATIVE MG/ML
NT-PROBNP SERPL-MCNC: 5572 PG/ML (ref 0–125)
OPIATES UR QL SCN: NEGATIVE QUAL
OSMOLALITY SERPL CALC.SUM OF ELEC: 272 MOSM/KG (ref 275–300)
PCP UR QL SCN: NEGATIVE QUAL
PH UR STRIP: 7 [PH] (ref 5–6)
PLATELET # BLD: 325 10X3/UL (ref 130–400)
PMV BLD AUTO: 10.2 FL (ref 7.4–10.4)
POTASSIUM SERPL-SCNC: 4 MMOL/L (ref 3.5–5.1)
PROT SERPL-MCNC: 7.7 G/DL (ref 6.4–8.2)
PROT UR-MCNC: NEGATIVE MG/DL
PROTHROMBIN TIME: 14.5 SECONDS (ref 11.6–15)
RBC # BLD AUTO: 5.4 10X6/UL (ref 4.2–6.1)
SODIUM SERPL-SCNC: 135 MMOL/L (ref 136–145)
SP GR UR STRIP: 1.01 (ref 1–1.02)
TROPONIN I SERPL-MCNC: < 0.017 NG/ML (ref 0–0.06)
UROBILINOGEN UR-MCNC: NORMAL MG/DL
WBC # BLD AUTO: 9.5 10X3/UL (ref 4.8–10.8)

## 2019-12-17 NOTE — MORECARE
CASE MANAGEMENT DISCHARGE SUMMARY
 
 
PATIENT: RYANNE BOGGS                   UNIT: [id number]
ACCOUNT#: [id number]                       ADM DATE: 19
AGE: 43     : [birth year]  SEX: M            ROOM/BED: D.2469    
AUTHOR: CUONG,DOC                             PHYSICIAN:                               
 
REFERRING PHYSICIAN: JAMIE POLO MD               
DATE OF SERVICE: 19
Discharge Plan
 
 
Patient Name: RYANNE BOGGS
Facility: [facility]:[place]
Encounter #: [id number]
Medical Record #: [id number]
: 1975
Planned Disposition: 
Anticipated Discharge Date: 
 
Discharge Date: 
Expected LOS: 
Initial Reviewer: [email or web address]
Initial Review Date: 2019
Generated: 19   4:46 pm 
Comments
 
DCP- Discharge Planning
 
Updated by [email or web address]: Nita Garcia on 19   2:37 pm CT
DC PLAN: Return home alone Independently.  
ANTICIPATED DC NEEDS: PCP  
 
CM met with patient to complete initial dc planning assessment.  CM educated 
patient on the CM role and verbal consent given by patient to complete 
assessment.  CM verified patient's address, phone number, and emergency 
contact phone numbers.  Patient lives at home alone and reports he is 
independent with his ADL's until he gets sick.  At discharge patient plans to 
return home alone and feels this is a safe discharge. His cousin in room asks 
about PCP info and also cg services info.  CM provided her with MD taking new 
patients and also information r/t [employer] to get more info.  
Informed her the patient had to have a certain type of medicaid and certain 
qualifications.  She verbalized understanding and will call to inquire.  CM 
discussed availability of home health, rehab services, and medical equipment. 
Patient denied known discharge needs at this time. Transportation provider at 
discharge will be his cousin. CM will continue to follow and will assist as 
needed with dc plans/needs.   
 
Nita Garcia RN, CCM
 
 DCPIA - Discharge Planning Initial Assessment
 
Updated by [email or web address]: Nita Garcia on 19   3:34 pm
*  Is the patient Alert and Oriented?
Yes
*  How many steps to enter\exit or inside your home? None *  PCP No PCP - information given
on providers accepting new patients.
*  Pharmacy
[facility] on [address]
*  Preadmission Environment
Home Alone
*  ADLs
Independent
*  Equipment
None
*  List name and contact numbers for known caregivers / representatives who 
currently or will assist patient after discharge:
Sheila Hsieh - kiki - [phone]
*  Community resources currently utilized
None
*  Additional services required to return to the preadmission environment?
No
*  Can the patient safely return to the preadmission environment?
Yes
*  Has this patient been hospitalized within the prior 30 days at any 
hospital?
No
 
 
 
 
 
 
Patient Name: RYANNE BOGGS
 
Encounter #: [id number]
Page [phone]
 
 
 
 
 
Electronically Signed by BRYCE HUTCHINS on 19 at 1547
 
 
 
 
 
 
**All edits/amendments must be made on the electronic document**
 
DICTATION DATE: 19 1546     : SARWAT  19 1546     
RPT#: [id number]                                DC DATE:        
                                               STATUS: ADM IN  
[facility]
 [place] [address]
***END OF REPORT***

## 2019-12-17 NOTE — NUR
PT CURRENTLY LYING ON RIGHT SIDE ASLEEP WITH EYES CLOSED. RR EVEN AND
UNLABORED ON RA. NO S/S OF DISTRESS NOTED. WILL CTM.

## 2019-12-17 NOTE — NUR
REPORT RECEIVED, WILL CONTINUE POC. PATIENT IS AAOX4, UP AD PORFIRIO. LYING ON
RIGHT SIDE. NO S/S OF DISTRESS OBSERVED, RR EVEN AND UNLABORED ON ROOM AIR.
PATIENT DENIES NEEDS AT THIS TIME. DINNER TRAY REMOVED. CL IN REACH, BED
LOCKED AND LOWERED. WILL CTM.

## 2019-12-18 VITALS — DIASTOLIC BLOOD PRESSURE: 69 MMHG | SYSTOLIC BLOOD PRESSURE: 105 MMHG

## 2019-12-18 VITALS — SYSTOLIC BLOOD PRESSURE: 117 MMHG | DIASTOLIC BLOOD PRESSURE: 80 MMHG

## 2019-12-18 VITALS — DIASTOLIC BLOOD PRESSURE: 68 MMHG | SYSTOLIC BLOOD PRESSURE: 119 MMHG

## 2019-12-18 VITALS — SYSTOLIC BLOOD PRESSURE: 123 MMHG | DIASTOLIC BLOOD PRESSURE: 83 MMHG

## 2019-12-18 VITALS — DIASTOLIC BLOOD PRESSURE: 82 MMHG | SYSTOLIC BLOOD PRESSURE: 126 MMHG

## 2019-12-18 VITALS — DIASTOLIC BLOOD PRESSURE: 42 MMHG | SYSTOLIC BLOOD PRESSURE: 115 MMHG

## 2019-12-18 LAB
ANION GAP SERPL CALC-SCNC: 14.6 MMOL/L (ref 8–16)
BASOPHILS NFR BLD AUTO: 0.3 % (ref 0–2)
BUN SERPL-MCNC: 12 MG/DL (ref 7–18)
CALCIUM SERPL-MCNC: 8.4 MG/DL (ref 8.5–10.1)
CHLORIDE SERPL-SCNC: 105 MMOL/L (ref 98–107)
CO2 SERPL-SCNC: 25 MMOL/L (ref 21–32)
CREAT SERPL-MCNC: 1.1 MG/DL (ref 0.6–1.3)
EOSINOPHIL NFR BLD: 3 % (ref 0–7)
ERYTHROCYTE [DISTWIDTH] IN BLOOD BY AUTOMATED COUNT: 15.3 % (ref 11.5–14.5)
GLUCOSE SERPL-MCNC: 187 MG/DL (ref 74–106)
HCT VFR BLD CALC: 41.1 % (ref 42–54)
HGB BLD-MCNC: 13.6 G/DL (ref 13.5–17.5)
IMM GRANULOCYTES NFR BLD: 0.3 % (ref 0–5)
LYMPHOCYTES NFR BLD AUTO: 26.9 % (ref 15–50)
MCH RBC QN AUTO: 27.2 PG (ref 26–34)
MCHC RBC AUTO-ENTMCNC: 33.1 G/DL (ref 31–37)
MCV RBC: 82.2 FL (ref 80–100)
MONOCYTES NFR BLD: 13.1 % (ref 2–11)
NEUTROPHILS NFR BLD AUTO: 56.4 % (ref 40–80)
OSMOLALITY SERPL CALC.SUM OF ELEC: 285 MOSM/KG (ref 275–300)
PLATELET # BLD: 298 10X3/UL (ref 130–400)
PMV BLD AUTO: 9.9 FL (ref 7.4–10.4)
POTASSIUM SERPL-SCNC: 3.6 MMOL/L (ref 3.5–5.1)
RBC # BLD AUTO: 5 10X6/UL (ref 4.2–6.1)
SODIUM SERPL-SCNC: 141 MMOL/L (ref 136–145)
WBC # BLD AUTO: 6.8 10X3/UL (ref 4.8–10.8)

## 2019-12-18 NOTE — NUR
PREVIOUS PIV INFILTRATED. REMOVED PIV WITH CATHETER TIP FULLY INTACT.
INITIATED NEW PIV TO THE LEFT FOREARM 20GA X1 ATTEMPT. PT TOLERATED WELL.
FLUSHED PIV WITH 10ML OF NS. DOBUTAMINE INFUSING @12ML/HR. WILL CTM.

## 2019-12-18 NOTE — NUR
REPORT RECEIVED. WILL CONTINUE WITH POC. PT CURRENTLY LYING SEMI FOWLERS. CALL
LIGHT W/I REACH. PT IS AAO AND UP AD PORFIRIO. RR EVEN AND UNLABORED ON RA.
DOBUTAMINE INFUSING @12ML/HR VIA R.HAND PIV. NO S/S OF DISTRESS NOTED. PT
DENIES ANY NEEDS. WILL CTM.

## 2019-12-18 NOTE — NUR
REPORT RECEIVED, WILL CONTINUE POC. PATIENT IS AAOX4, LYING IN LOW-FOWLERS
POSITION. NO S/S OF DISTRESS OBSERVED, RR EVEN AND UNLABORED. PATIENT BROTHER,
KALIA ASKED FOR UPDATE ON PATIENT. PERMISSION RECEIVED FROM PATIENT TO GIVE
INFORMATION TO BROTHER. PATIENT REQUESTED COKES AND CUP OF ICE, GIVEN. PATIENT
DENIES FURTHER NEEDS AT THIS TIME. CL IN REACH, BED LOCKED AND LOWERED. WILL
CTM.

## 2019-12-19 VITALS — DIASTOLIC BLOOD PRESSURE: 85 MMHG | SYSTOLIC BLOOD PRESSURE: 120 MMHG

## 2019-12-19 VITALS — DIASTOLIC BLOOD PRESSURE: 96 MMHG | SYSTOLIC BLOOD PRESSURE: 140 MMHG

## 2019-12-19 VITALS — SYSTOLIC BLOOD PRESSURE: 118 MMHG | DIASTOLIC BLOOD PRESSURE: 94 MMHG

## 2019-12-19 LAB
ANION GAP SERPL CALC-SCNC: 13 MMOL/L (ref 8–16)
BASOPHILS NFR BLD AUTO: 0.5 % (ref 0–2)
BUN SERPL-MCNC: 14 MG/DL (ref 7–18)
CALCIUM SERPL-MCNC: 8.6 MG/DL (ref 8.5–10.1)
CHLORIDE SERPL-SCNC: 103 MMOL/L (ref 98–107)
CO2 SERPL-SCNC: 23.5 MMOL/L (ref 21–32)
CREAT SERPL-MCNC: 1.2 MG/DL (ref 0.6–1.3)
EOSINOPHIL NFR BLD: 6.3 % (ref 0–7)
ERYTHROCYTE [DISTWIDTH] IN BLOOD BY AUTOMATED COUNT: 15.4 % (ref 11.5–14.5)
GLUCOSE SERPL-MCNC: 150 MG/DL (ref 74–106)
HCT VFR BLD CALC: 44.5 % (ref 42–54)
HGB BLD-MCNC: 14.3 G/DL (ref 13.5–17.5)
IMM GRANULOCYTES NFR BLD: 0.8 % (ref 0–5)
LYMPHOCYTES NFR BLD AUTO: 30.9 % (ref 15–50)
MCH RBC QN AUTO: 26.9 PG (ref 26–34)
MCHC RBC AUTO-ENTMCNC: 32.1 G/DL (ref 31–37)
MCV RBC: 83.6 FL (ref 80–100)
MONOCYTES NFR BLD: 14.2 % (ref 2–11)
NEUTROPHILS NFR BLD AUTO: 47.3 % (ref 40–80)
OSMOLALITY SERPL CALC.SUM OF ELEC: 275 MOSM/KG (ref 275–300)
PLATELET # BLD: 330 10X3/UL (ref 130–400)
PMV BLD AUTO: 10.2 FL (ref 7.4–10.4)
POTASSIUM SERPL-SCNC: 3.5 MMOL/L (ref 3.5–5.1)
RBC # BLD AUTO: 5.32 10X6/UL (ref 4.2–6.1)
SODIUM SERPL-SCNC: 136 MMOL/L (ref 136–145)
WBC # BLD AUTO: 6.2 10X3/UL (ref 4.8–10.8)

## 2019-12-19 NOTE — NUR
PT DISCHARGED HOME VIA AMBULATORY. TELEMETRY REMOVED AND RETURNED. PT SIGNED
PROPER DISCHARGE INSTRUCTIONS AND REMOVED ALL VALUABLES FROM THE ROOM. PIV
REMOVED WITH CATHETER TIP FULLY INTACT.

## 2019-12-19 NOTE — NUR
REPORT RECEIVED. WILL CONTINUE WITH POC. PT CURRENTLY LYING SEMI FOWLERS. CALL
LIGHT W/I REACH. PT IS ASLEEP WITH EYES CLOSED AT THIS TIME. RR EVEN AND
UNLABORED ON RA. DOBUTAMINE INFUSING @6ML/HR VIA L.FOR PIV. NO S/S OF DISTRESS
NOTED. WILL CTM.

## 2019-12-20 NOTE — MORECARE
CASE MANAGEMENT DISCHARGE SUMMARY
 
 
PATIENT: RYANNE BOGGS                   UNIT: [id number]
ACCOUNT#: [id number]                       ADM DATE: 19
AGE: 43     : 77  SEX: M            ROOM/BED: D.1042    
AUTHOR: CUONG,DOC                             PHYSICIAN:                               
 
REFERRING PHYSICIAN: JAMIE POLO MD               
DATE OF SERVICE: 19
Discharge Plan
 
 
Patient Name: RYANNE BOGGS
Facility: [facility]:[place]
Encounter #: [id number]
Medical Record #: [id number]
: 1975
Planned Disposition: Home
Anticipated Discharge Date: 19
 
Discharge Date: 2019
Expected LOS: 2
Initial Reviewer: [email or web address]
Initial Review Date: 2019
Generated: 19   5:56 pm 
DCP- Discharge Planning
 
Updated by [email or web address]: Nita Garcia on 19   2:37 pm CT
DC PLAN: Return home alone Independently.  
ANTICIPATED DC NEEDS: PCP  
 
CM met with patient to complete initial dc planning assessment.  CM educated 
patient on the CM role and verbal consent given by patient to complete 
assessment.  CM verified patient's address, phone number, and emergency 
contact phone numbers.  Patient lives at home alone and reports he is 
independent with his ADL's until he gets sick.  At discharge patient plans to 
return home alone and feels this is a safe discharge. His cousin in room asks 
about PCP info and also cg services info.  CM provided her with MD taking new 
patients and also information r/t [employer] to get more info.  
Informed her the patient had to have a certain type of medicaid and certain 
qualifications.  She verbalized understanding and will call to inquire.  CM 
discussed availability of home health, rehab services, and medical equipment. 
Patient denied known discharge needs at this time. Transportation provider at 
discharge will be his cousin. CM will continue to follow and will assist as 
needed with dc plans/needs.   
 
Nita Garcia RN, [facility]
 DCPIA - Discharge Planning Initial Assessment
 
 
Updated by [id number]: Nita Garcia on 19   3:34 pm
*  Is the patient Alert and Oriented?
Yes
*  How many steps to enter\exit or inside your home? None *  PCP No PCP - information given
on providers accepting new patients.
*  Pharmacy
[facility] on [address]
*  Preadmission Environment
Home Alone
*  ADLs
Independent
*  Equipment
None
*  List name and contact numbers for known caregivers / representatives who 
currently or will assist patient after discharge:
Sheila Hsieh - kiki - [phone]
*  Community resources currently utilized
None
*  Additional services required to return to the preadmission environment?
No
*  Can the patient safely return to the preadmission environment?
Yes
*  Has this patient been hospitalized within the prior 30 days at any 
hospital?
No
 
 
 
 
 
 
 
Last DP export: 19   2:47 
Patient Name: RYANNE BOGGS
 
Encounter #: [id number]
Page [phone]
 
 
 
 
 
Electronically Signed by BRYCE HUTCHINS on 19 at 1656
 
 
 
 
 
 
**All edits/amendments must be made on the electronic document**
 
DICTATION DATE: 19     : SARWAT  19     
RPT#: [id number]                                DC DATE:19
                                               STATUS: DIS IN  
[facility]
 [place] [address]
***END OF REPORT***

## 2020-01-16 ENCOUNTER — HOSPITAL ENCOUNTER (INPATIENT)
Dept: HOSPITAL 84 - D.ER | Age: 45
LOS: 3 days | Discharge: HOME | DRG: 292 | End: 2020-01-19
Attending: INTERNAL MEDICINE | Admitting: INTERNAL MEDICINE
Payer: MEDICARE

## 2020-01-16 VITALS — SYSTOLIC BLOOD PRESSURE: 128 MMHG | DIASTOLIC BLOOD PRESSURE: 94 MMHG

## 2020-01-16 VITALS — SYSTOLIC BLOOD PRESSURE: 101 MMHG | DIASTOLIC BLOOD PRESSURE: 62 MMHG

## 2020-01-16 VITALS
WEIGHT: 168.85 LBS | BODY MASS INDEX: 25.01 KG/M2 | HEIGHT: 69 IN | HEIGHT: 69 IN | BODY MASS INDEX: 25.01 KG/M2 | BODY MASS INDEX: 25.01 KG/M2 | WEIGHT: 168.85 LBS

## 2020-01-16 VITALS — SYSTOLIC BLOOD PRESSURE: 104 MMHG | DIASTOLIC BLOOD PRESSURE: 68 MMHG

## 2020-01-16 DIAGNOSIS — F17.213: ICD-10-CM

## 2020-01-16 DIAGNOSIS — I42.9: ICD-10-CM

## 2020-01-16 DIAGNOSIS — A59.9: ICD-10-CM

## 2020-01-16 DIAGNOSIS — I11.0: Primary | ICD-10-CM

## 2020-01-16 DIAGNOSIS — I50.23: ICD-10-CM

## 2020-01-16 DIAGNOSIS — D64.9: ICD-10-CM

## 2020-01-16 LAB
ALBUMIN SERPL-MCNC: 2.8 G/DL (ref 3.4–5)
ALP SERPL-CCNC: 84 U/L (ref 46–116)
ALT SERPL-CCNC: 60 U/L (ref 10–68)
AMPHETAMINES UR QL SCN: NEGATIVE QUAL
ANION GAP SERPL CALC-SCNC: 12.8 MMOL/L (ref 8–16)
APPEARANCE UR: CLEAR
APTT BLD: 29.4 SECONDS (ref 22.8–39.4)
BACTERIA #/AREA URNS HPF: (no result) /HPF
BARBITURATES UR QL SCN: NEGATIVE QUAL
BASOPHILS NFR BLD AUTO: 0.6 % (ref 0–2)
BENZODIAZ UR QL SCN: NEGATIVE QUAL
BILIRUB SERPL-MCNC: 0.37 MG/DL (ref 0.2–1.3)
BILIRUB SERPL-MCNC: NEGATIVE MG/DL
BUN SERPL-MCNC: 11 MG/DL (ref 7–18)
BZE UR QL SCN: NEGATIVE QUAL
CALCIUM SERPL-MCNC: 8.1 MG/DL (ref 8.5–10.1)
CANNABINOIDS UR QL SCN: POSITIVE QUAL
CHLORIDE SERPL-SCNC: 104 MMOL/L (ref 98–107)
CK MB SERPL-MCNC: 1.7 U/L (ref 0–3.6)
CK MB SERPL-MCNC: 1.9 U/L (ref 0–3.6)
CK MB SERPL-MCNC: 2.4 U/L (ref 0–3.6)
CK SERPL-CCNC: 146 UL (ref 21–232)
CK SERPL-CCNC: 157 UL (ref 21–232)
CK SERPL-CCNC: 175 UL (ref 21–232)
CO2 SERPL-SCNC: 26 MMOL/L (ref 21–32)
COLOR UR: YELLOW
CREAT SERPL-MCNC: 1.1 MG/DL (ref 0.6–1.3)
EOSINOPHIL NFR BLD: 2 % (ref 0–7)
ERYTHROCYTE [DISTWIDTH] IN BLOOD BY AUTOMATED COUNT: 15.3 % (ref 11.5–14.5)
GLOBULIN SER-MCNC: 3.6 G/L
GLUCOSE SERPL-MCNC: 119 MG/DL (ref 74–106)
GLUCOSE SERPL-MCNC: NEGATIVE MG/DL
HCT VFR BLD CALC: 38.7 % (ref 42–54)
HGB BLD-MCNC: 12.6 G/DL (ref 13.5–17.5)
IMM GRANULOCYTES NFR BLD: 0.4 % (ref 0–5)
INR PPP: 1.15 (ref 0.85–1.17)
KETONES UR STRIP-MCNC: NEGATIVE MG/DL
LIPASE SERPL-CCNC: 208 U/L (ref 73–393)
LYMPHOCYTES NFR BLD AUTO: 23.7 % (ref 15–50)
MAGNESIUM SERPL-MCNC: 2.1 MG/DL (ref 1.8–2.4)
MCH RBC QN AUTO: 26 PG (ref 26–34)
MCHC RBC AUTO-ENTMCNC: 32.6 G/DL (ref 31–37)
MCV RBC: 80 FL (ref 80–100)
MONOCYTES NFR BLD: 8.4 % (ref 2–11)
NEUTROPHILS NFR BLD AUTO: 64.9 % (ref 40–80)
NITRITE UR-MCNC: NEGATIVE MG/ML
NT-PROBNP SERPL-MCNC: 3726 PG/ML (ref 0–125)
OPIATES UR QL SCN: NEGATIVE QUAL
OSMOLALITY SERPL CALC.SUM OF ELEC: 277 MOSM/KG (ref 275–300)
PCP UR QL SCN: NEGATIVE QUAL
PH UR STRIP: 5 [PH] (ref 5–6)
PLATELET # BLD: 242 10X3/UL (ref 130–400)
PMV BLD AUTO: 9.7 FL (ref 7.4–10.4)
POTASSIUM SERPL-SCNC: 3.8 MMOL/L (ref 3.5–5.1)
PROT SERPL-MCNC: 6.4 G/DL (ref 6.4–8.2)
PROT UR-MCNC: (no result) MG/DL
PROTHROMBIN TIME: 14.6 SECONDS (ref 11.6–15)
RBC # BLD AUTO: 4.84 10X6/UL (ref 4.2–6.1)
RBC #/AREA URNS HPF: (no result) /HPF (ref 0–5)
SODIUM SERPL-SCNC: 139 MMOL/L (ref 136–145)
SP GR UR STRIP: 1.02 (ref 1–1.02)
SQUAMOUS #/AREA URNS HPF: (no result) /HPF (ref 0–5)
T VAGINALIS URNS QL MICRO: PRESENT /HPF
TROPONIN I SERPL-MCNC: 0.02 NG/ML (ref 0–0.06)
TROPONIN I SERPL-MCNC: 0.03 NG/ML (ref 0–0.06)
TROPONIN I SERPL-MCNC: 0.03 NG/ML (ref 0–0.06)
UROBILINOGEN UR-MCNC: NORMAL MG/DL
WBC # BLD AUTO: 8 10X3/UL (ref 4.8–10.8)
WBC #/AREA URNS HPF: (no result) /HPF

## 2020-01-16 NOTE — NUR
RECEIVED PT TO ROOM 2119 VIA W/C FROM ER DX CHF PT AAOX4 RESP NOTED SOB
REFUSED O2 AT THIS TIME EXPLAINED IF O2 DROPPED BELOW 90% WE WOULD HAVE TO
PLACE PT ON O2 STATES UNDERSTANDING DENIES ANY PAIN OR NEEDS AT THIS TIME

## 2020-01-17 VITALS — DIASTOLIC BLOOD PRESSURE: 70 MMHG | SYSTOLIC BLOOD PRESSURE: 113 MMHG

## 2020-01-17 VITALS — DIASTOLIC BLOOD PRESSURE: 67 MMHG | SYSTOLIC BLOOD PRESSURE: 101 MMHG

## 2020-01-17 VITALS — DIASTOLIC BLOOD PRESSURE: 56 MMHG | SYSTOLIC BLOOD PRESSURE: 101 MMHG

## 2020-01-17 VITALS — DIASTOLIC BLOOD PRESSURE: 69 MMHG | SYSTOLIC BLOOD PRESSURE: 108 MMHG

## 2020-01-17 VITALS — DIASTOLIC BLOOD PRESSURE: 51 MMHG | SYSTOLIC BLOOD PRESSURE: 101 MMHG

## 2020-01-17 LAB
ANION GAP SERPL CALC-SCNC: 13.2 MMOL/L (ref 8–16)
BASOPHILS NFR BLD AUTO: 0.5 % (ref 0–2)
BUN SERPL-MCNC: 13 MG/DL (ref 7–18)
CALCIUM SERPL-MCNC: 8.7 MG/DL (ref 8.5–10.1)
CHLORIDE SERPL-SCNC: 104 MMOL/L (ref 98–107)
CO2 SERPL-SCNC: 28.5 MMOL/L (ref 21–32)
CREAT SERPL-MCNC: 1.2 MG/DL (ref 0.6–1.3)
EOSINOPHIL NFR BLD: 2.7 % (ref 0–7)
ERYTHROCYTE [DISTWIDTH] IN BLOOD BY AUTOMATED COUNT: 15.3 % (ref 11.5–14.5)
GLUCOSE SERPL-MCNC: 112 MG/DL (ref 74–106)
HCT VFR BLD CALC: 42.4 % (ref 42–54)
HGB BLD-MCNC: 13.9 G/DL (ref 13.5–17.5)
IMM GRANULOCYTES NFR BLD: 0.3 % (ref 0–5)
LYMPHOCYTES NFR BLD AUTO: 28.3 % (ref 15–50)
MAGNESIUM SERPL-MCNC: 2.1 MG/DL (ref 1.8–2.4)
MCH RBC QN AUTO: 26.1 PG (ref 26–34)
MCHC RBC AUTO-ENTMCNC: 32.8 G/DL (ref 31–37)
MCV RBC: 79.7 FL (ref 80–100)
MONOCYTES NFR BLD: 12.2 % (ref 2–11)
NEUTROPHILS NFR BLD AUTO: 56 % (ref 40–80)
OSMOLALITY SERPL CALC.SUM OF ELEC: 283 MOSM/KG (ref 275–300)
PHOSPHATE SERPL-MCNC: 4.7 MG/DL (ref 2.5–4.9)
PLATELET # BLD: 275 10X3/UL (ref 130–400)
PMV BLD AUTO: 10.5 FL (ref 7.4–10.4)
POTASSIUM SERPL-SCNC: 3.7 MMOL/L (ref 3.5–5.1)
RBC # BLD AUTO: 5.32 10X6/UL (ref 4.2–6.1)
SODIUM SERPL-SCNC: 142 MMOL/L (ref 136–145)
WBC # BLD AUTO: 6.6 10X3/UL (ref 4.8–10.8)

## 2020-01-17 NOTE — NUR
PATIENT RESTING COMFORTABLY IN BED. RESPIRATIONS ARE EVEN AND UNLABORED. NO
S/S OF DISTRESS. NO C/O PAIN. CALL LIGHT WITHIN REACH

## 2020-01-17 NOTE — NUR
ALERT AND ORIENTED. TELEMERTY SHOWS . ROOM AIR. LEFT HAND IV WITH
DOBUTAIME AT 7 AND BUMEX AT 3.5. C/O HEADACHE , TYLENOL GIVEN FOR RELIEF

## 2020-01-17 NOTE — EC
PATIENT:RYANNE BOGGS               DATE OF SERVICE: 01/16/20
SEX: M                                  MEDICAL RECORD: [id number]
DATE OF BIRTH: [birth year]                        LOCATION:D.M2      D.211
AGE OF PATIENT: 44                             ADMISSION DATE: 01/16/20
 
REFERRING PHYSICIAN:                               
 
INTERPRETING PHYSICIAN: SAURAV JAIMES MD          
 
 
 
                             ECHOCARDIOGRAM REPORT
  ECHO CHARGES 4               ECHO COMPLETE                 Date: 01/16/20
 
 
 
CLINICAL DIAGNOSIS: CHF                           
 
                         ECHOCARDIOGRAPHIC MEASUREMENTS
      (adult normal given)
   AC root (d.<3.7cm) 3.3  cm   LV Septum d (<1.2 cm> 1.2  cm
      Valve Excursion 1.9  cm     LV Septum (systole) 1.6  cm
Left Atria (s.<4.0cm> 4.8  cm          LVPW d(<1.2cm) 1.0  cm
        RV (d.<2.3cm) 2.7  cm           LVPW (sytole) 1.6  cm
  LV diastole(<5.6CM) 8.0  cm       MV E-F(>70mm/sec)      cm
           LV systole 6.5  cm           LVOT Diameter 1.8  cm
       MV exc.(>10mm)      cm
Est.ejection fraction (50-75%)     %
 
   DOPPLER:
     LVIT      cm/sec A      cm/sec E 158   cm/sec
       LA      cm/sec      RVSP 55.0 mmHg
     LVOT 60.0 cm/sec   AOP1/2T      m/s
  Asc. Ao 89.0 cm/sec
     RVOT 37.0 cm/sec
       RA      cm/sec
       PA 51.0 cm/sec
 AV Gradient Peak 3.2  mmHg  AV Mean 1.4  mmHg  AV Area 2.1  cm
 MV Gradient Peak 10.1 mmHg  MV Mean 3.5  mmHg  MV Area      cm
   COMMENTS:                                              
 
 
 Cardiac Sonographer: 1               AYESHA DE LEONOE            
      Cardiologist: 3          Dr. Bennett             
             TAPE# PACS           
                                       Pericardial Effusion N                        
 
 
DATE OF SERVICE:  
 
Borderline LVH.  LV internal dimension is dilated.  LV is severely globally
hypokinetic with reduced EF, estimated EF 10-15%.  Aortic valve is tricuspid. 
No evidence of stenosis by Doppler interrogation.  Mild AI by color flow
imaging.  Left atrium is dilated at 4.8 cm.  Mitral valve shows no prolapse,
severe MR.  Right-sided chambers are grossly normal.  Moderate-to-severe TR.
 
TRANSINT:[id number] Voice Confirmation ID: [id number] DOCUMENT ID: [id number]
 
 
 
ECHOCARDIOGRAM REPORT                          [id number]    RYANNE BOGGS GREGORY A MD          
 
 
 
Electronically Signed by SAURAV JAIMES on 01/17/20 at 1440
 
 
 
 
 
 
 
 
 
 
 
 
 
 
 
 
 
 
 
 
 
 
 
 
 
 
 
 
 
 
 
 
 
 
 
 
 
 
 
 
CC:                                                             [id number]
DICTATION DATE: 01/16/20 1257     :     01/16/20 1404      ADM IN  
                                                                              
[facility]                                          
[address] [id number]

## 2020-01-18 VITALS — SYSTOLIC BLOOD PRESSURE: 106 MMHG | DIASTOLIC BLOOD PRESSURE: 68 MMHG

## 2020-01-18 VITALS — SYSTOLIC BLOOD PRESSURE: 92 MMHG | DIASTOLIC BLOOD PRESSURE: 61 MMHG

## 2020-01-18 VITALS — DIASTOLIC BLOOD PRESSURE: 66 MMHG | SYSTOLIC BLOOD PRESSURE: 107 MMHG

## 2020-01-18 VITALS — DIASTOLIC BLOOD PRESSURE: 56 MMHG | SYSTOLIC BLOOD PRESSURE: 84 MMHG

## 2020-01-18 VITALS — DIASTOLIC BLOOD PRESSURE: 65 MMHG | SYSTOLIC BLOOD PRESSURE: 96 MMHG

## 2020-01-18 LAB
ANION GAP SERPL CALC-SCNC: 16.6 MMOL/L (ref 8–16)
BASOPHILS NFR BLD AUTO: 0.9 % (ref 0–2)
BUN SERPL-MCNC: 19 MG/DL (ref 7–18)
CALCIUM SERPL-MCNC: 9.2 MG/DL (ref 8.5–10.1)
CHLORIDE SERPL-SCNC: 98 MMOL/L (ref 98–107)
CO2 SERPL-SCNC: 25.4 MMOL/L (ref 21–32)
CREAT SERPL-MCNC: 1.2 MG/DL (ref 0.6–1.3)
EOSINOPHIL NFR BLD: 7.7 % (ref 0–7)
ERYTHROCYTE [DISTWIDTH] IN BLOOD BY AUTOMATED COUNT: 15.6 % (ref 11.5–14.5)
GLUCOSE SERPL-MCNC: 136 MG/DL (ref 74–106)
HCT VFR BLD CALC: 46.3 % (ref 42–54)
HGB BLD-MCNC: 15.3 G/DL (ref 13.5–17.5)
IMM GRANULOCYTES NFR BLD: 0.3 % (ref 0–5)
LYMPHOCYTES NFR BLD AUTO: 33.9 % (ref 15–50)
MAGNESIUM SERPL-MCNC: 2.2 MG/DL (ref 1.8–2.4)
MCH RBC QN AUTO: 26.2 PG (ref 26–34)
MCHC RBC AUTO-ENTMCNC: 33 G/DL (ref 31–37)
MCV RBC: 79.1 FL (ref 80–100)
MONOCYTES NFR BLD: 12.1 % (ref 2–11)
NEUTROPHILS NFR BLD AUTO: 45.1 % (ref 40–80)
OSMOLALITY SERPL CALC.SUM OF ELEC: 275 MOSM/KG (ref 275–300)
PHOSPHATE SERPL-MCNC: 5.4 MG/DL (ref 2.5–4.9)
PLATELET # BLD: 301 10X3/UL (ref 130–400)
PMV BLD AUTO: 10 FL (ref 7.4–10.4)
POTASSIUM SERPL-SCNC: 4 MMOL/L (ref 3.5–5.1)
RBC # BLD AUTO: 5.85 10X6/UL (ref 4.2–6.1)
SODIUM SERPL-SCNC: 136 MMOL/L (ref 136–145)
WBC # BLD AUTO: 6.8 10X3/UL (ref 4.8–10.8)

## 2020-01-18 NOTE — NUR
REPORT AND INITIAL ROUNDS COMPLETED. IV BUMEX INFUSING TO RFA. PT
ALERT/ORIENTED. VOICING NO C/O. CALL LIGHT IN REACH. CPOC.

## 2020-01-19 VITALS — SYSTOLIC BLOOD PRESSURE: 100 MMHG | DIASTOLIC BLOOD PRESSURE: 72 MMHG

## 2020-01-19 VITALS — DIASTOLIC BLOOD PRESSURE: 66 MMHG | SYSTOLIC BLOOD PRESSURE: 93 MMHG

## 2020-01-19 VITALS — SYSTOLIC BLOOD PRESSURE: 105 MMHG | DIASTOLIC BLOOD PRESSURE: 75 MMHG

## 2020-01-19 LAB
ANION GAP SERPL CALC-SCNC: 15.1 MMOL/L (ref 8–16)
BASOPHILS NFR BLD AUTO: 1.3 % (ref 0–2)
BUN SERPL-MCNC: 27 MG/DL (ref 7–18)
CALCIUM SERPL-MCNC: 8.7 MG/DL (ref 8.5–10.1)
CHLORIDE SERPL-SCNC: 98 MMOL/L (ref 98–107)
CO2 SERPL-SCNC: 24.4 MMOL/L (ref 21–32)
CREAT SERPL-MCNC: 1.3 MG/DL (ref 0.6–1.3)
EOSINOPHIL NFR BLD: 8.1 % (ref 0–7)
ERYTHROCYTE [DISTWIDTH] IN BLOOD BY AUTOMATED COUNT: 14.9 % (ref 11.5–14.5)
GLUCOSE SERPL-MCNC: 124 MG/DL (ref 74–106)
HCT VFR BLD CALC: 46.7 % (ref 42–54)
HGB BLD-MCNC: 15.6 G/DL (ref 13.5–17.5)
IMM GRANULOCYTES NFR BLD: 0.6 % (ref 0–5)
LYMPHOCYTES NFR BLD AUTO: 40.5 % (ref 15–50)
MAGNESIUM SERPL-MCNC: 2.2 MG/DL (ref 1.8–2.4)
MCH RBC QN AUTO: 26.2 PG (ref 26–34)
MCHC RBC AUTO-ENTMCNC: 33.4 G/DL (ref 31–37)
MCV RBC: 78.4 FL (ref 80–100)
MONOCYTES NFR BLD: 13 % (ref 2–11)
NEUTROPHILS NFR BLD AUTO: 36.5 % (ref 40–80)
OSMOLALITY SERPL CALC.SUM OF ELEC: 271 MOSM/KG (ref 275–300)
PHOSPHATE SERPL-MCNC: 5.5 MG/DL (ref 2.5–4.9)
PLATELET # BLD: 340 10X3/UL (ref 130–400)
PMV BLD AUTO: 9.8 FL (ref 7.4–10.4)
POTASSIUM SERPL-SCNC: 4.5 MMOL/L (ref 3.5–5.1)
RBC # BLD AUTO: 5.96 10X6/UL (ref 4.2–6.1)
SODIUM SERPL-SCNC: 133 MMOL/L (ref 136–145)
WBC # BLD AUTO: 7 10X3/UL (ref 4.8–10.8)

## 2020-01-19 NOTE — MORECARE
CASE MANAGEMENT DISCHARGE SUMMARY
 
 
PATIENT: RYANNE BOGGS                   UNIT: [id number]
ACCOUNT#: [id number]                       ADM DATE: 20
AGE: 44     : 77  SEX: M            ROOM/BED: D.2119    
AUTHOR: BRYCE HUTCHINS                             PHYSICIAN:                               
 
REFERRING PHYSICIAN: JAMIE POLO MD               
DATE OF SERVICE: 20
Discharge Plan
 
 
Patient Name: RYANNE BOGGS
Facility: [facility]:[place]
Encounter #: [id number]
Medical Record #: [id number]
: 1975
Planned Disposition: Home
Anticipated Discharge Date: 
 
Discharge Date: 2020
Expected LOS: 
Initial Reviewer: [email or web address]
Initial Review Date: 2020
Generated: 20   7:11 pm 
 DCPIA - Discharge Planning Initial Assessment
 
Updated by [email or web address]: Cheryl Espinoza on 20   6:11 pm
*  Is the patient Alert and Oriented?
Yes
*  PCP
NO PCP
*  [facility]
*  Preadmission Environment
Home Alone
*  ADLs
Independent
*  Equipment
None
*  List name and contact numbers for known caregivers / representatives who 
currently or will assist patient after discharge:
KALIA WHALEY [facility] [phone]
*  Verbal permission to speak to the caregivers and representatives has been 
obtained from the patient.
Yes
*  Community resources currently utilized
None
*  Additional services required to return to the preadmission environment?
 
No
*  Can the patient safely return to the preadmission environment?
Yes
*  Has this patient been hospitalized within the prior 30 days at any 
hospital?
No
 
 
 
 
 
Coverage Notice
 
Reviewer: [email or web address] - Cheryl Espinoza
 
Notice Issued Date-Time: 2020  12:02
Notice Type: IM Discharge Notice
 
Notice Delivered To: Patient
Relationship to Patient: Self
Representative Name: 
 
Delivery Method: HAND - Hand Delivered
Roseann Days:
Prior Verbal Notification: 
 
Recipient Understood Notice: Yes
Recipient Signature: Yes
Med Rec Note Co-signed by Attending:
 
Coverage Notice Comment:  
Patient Name: RYANNE BOGGS
Encounter #: [id number]
Page [phone]
 
 
 
 
 
Electronically Signed by BRYCE HUTCHINS on 20 at 1812
 
 
 
 
 
 
**All edits/amendments must be made on the electronic document**
 
DICTATION DATE: 20     : SARWAT  20     
RPT#: [id number]                                DC DATE:20
                                               STATUS: DIS IN  
[facility]
 [place] [address]
***END OF REPORT***

## 2020-01-19 NOTE — MORECARE
CASE MANAGEMENT DISCHARGE SUMMARY
 
 
PATIENT: RYANNE BOGGS                   UNIT: [id number]
ACCOUNT#: [id number]                       ADM DATE: 20
AGE: 44     : 77  SEX: M            ROOM/BED: D.5842    
AUTHOR: CUONG,DOC                             PHYSICIAN:                               
 
REFERRING PHYSICIAN: JAMIE POLO MD               
DATE OF SERVICE: 20
Discharge Plan
 
 
Patient Name: RYANNE BOGGS
Facility: [facility]:[place]
Encounter #: [id number]
Medical Record #: [id number]
: 1975
Planned Disposition: Home
Anticipated Discharge Date: 
 
Discharge Date: 2020
Expected LOS: 
Initial Reviewer: [email or web address]
Initial Review Date: 2020
Generated: 20   7:24 pm 
Comments
 
DCP- Discharge Planning
 
Updated by [email or web address]: Cheryl Espinoza on 20   5:20 pm CT
Patient Name: RYANNE BOGGS                                     
Admission Status: ER   
Accout number: [id number]                              
Admission Date: 2020   
: 1975                                                        
Admission Diagnosis:   
Attending: JAMIE POLO                                                
Current LOS:  3   
  
Anticipated DC Date:    
Planned Disposition: Home   
Primary Insurance: MEDICARE A & B   
  
  
Discharge Planning Comments: CM met with patient at bedside after explaining 
CM role and obtaining verbal consent. Patient lives at home alone where he is 
independent with his care and plans to return there upon discharge. Patient 
feels this would be a safe discharge. CM discussed availability / needs of 
home health and medical equipment. Patient denies any discharge needs at this 
 
time. Patient states he will have his family drive him home upon discharge. 
CM will continue to follow and assist as needed with discharge planning / 
needs. D/C IMM signed 20 @ 1202  
  
  
  
  
  
  
: Cheryl Espinoza
 DCPIA - Discharge Planning Initial Assessment
 
Updated by [id number]: Cheryl Espinoza on 20   6:11 pm
*  Is the patient Alert and Oriented?
Yes
*  PCP
NO PCP
*  Pharmacy
[facility] [address]
*  Preadmission Environment
Home Alone
*  ADLs
Independent
*  Equipment
None
*  List name and contact numbers for known caregivers / representatives who 
currently or will assist patient after discharge:
KALIA WHALEY    [phone]
*  Verbal permission to speak to the caregivers and representatives has been 
obtained from the patient.
Yes
*  Community resources currently utilized
None
*  Additional services required to return to the preadmission environment?
No
*  Can the patient safely return to the preadmission environment?
Yes
*  Has this patient been hospitalized within the prior 30 days at any 
hospital?
No
 
 
 
 
 
Coverage Notice
 
Reviewer: [email or web address] Leigh Ann Espinoza
 
Notice Issued Date-Time: 2020  12:02
Notice Type: IM Discharge Notice
 
 
Notice Delivered To: Patient
Relationship to Patient: Self
Representative Name: 
 
Delivery Method: HAND - Hand Delivered
Roseann Days:
Prior Verbal Notification: 
 
Recipient Understood Notice: Yes
Recipient Signature: Yes
Med Rec Note Co-signed by Attending:
 
Coverage Notice Comment:  
 
Last DP export: 20   5:12 p
Patient Name: RYANNE BOGGS
Encounter #: [id number]
Page [phone]
 
 
 
 
 
Electronically Signed by BRYCE HUTCHINS on 20 at 9104
 
 
 
 
 
 
**All edits/amendments must be made on the electronic document**
 
DICTATION DATE: 20     : SARWAT  20     
RPT#: [id number]                                DC DATE:20
                                               STATUS: DIS IN  
[facility]
[address] [place] [address]
***END OF REPORT***

## 2020-01-19 NOTE — MORECARE
CASE MANAGEMENT DISCHARGE SUMMARY
 
 
PATIENT: RYANNE BOGGS                   UNIT: [id number]
ACCOUNT#: [id number]                       ADM DATE: 20
AGE: 44     : 77  SEX: M            ROOM/BED: D.9152    
AUTHOR: CUONG,DOC                             PHYSICIAN:                               
 
REFERRING PHYSICIAN: JAMIE POLO MD               
DATE OF SERVICE: 20
Discharge Plan
 
 
Patient Name: RYANNE BOGGS
Facility: [facility]:[place]
Encounter #: [id number]
Medical Record #: [id number]
: 1975
Planned Disposition: Home
Anticipated Discharge Date: 
 
Discharge Date: 2020
Expected LOS: 
Initial Reviewer: [email or web address]
Initial Review Date: 2020
Generated: 20   8:11 pm 
Comments
 
DCP- Discharge Planning
 
Updated by [email or web address]: Cheryl Espinoza on 20   5:20 pm CT
Patient Name: RYANNE BOGGS                                     
Admission Status: ER   
Accout number: [id number]                              
Admission Date: 2020   
: 1975                                                        
Admission Diagnosis:   
Attending: JAMIE POLO                                                
Current LOS:  3   
  
Anticipated DC Date:    
Planned Disposition: Home   
Primary Insurance: MEDICARE A & B   
  
  
Discharge Planning Comments: CM met with patient at bedside after explaining 
CM role and obtaining verbal consent. Patient lives at home alone where he is 
independent with his care and plans to return there upon discharge. Patient 
feels this would be a safe discharge. CM discussed availability / needs of 
home health and medical equipment. Patient denies any discharge needs at this 
 
time. Patient states he will have his family drive him home upon discharge. 
CM will continue to follow and assist as needed with discharge planning / 
needs. D/C IMM signed 20 @ 1202  
  
  
  
  
  
  
: Cheryl Espinoza
 DCPIA - Discharge Planning Initial Assessment
 
Updated by [email or web address]: Cheryl Espinoza on 20   6:11 pm
*  Is the patient Alert and Oriented?
Yes
*  PCP
NO PCP
*  Pharmacy
[facility]
*  Preadmission Environment
Home Alone
*  ADLs
Independent
*  Equipment
None
*  List name and contact numbers for known caregivers / representatives who 
currently or will assist patient after discharge:
KALIA WHALEY    [phone]
*  Verbal permission to speak to the caregivers and representatives has been 
obtained from the patient.
Yes
*  Community resources currently utilized
None
*  Additional services required to return to the preadmission environment?
No
*  Can the patient safely return to the preadmission environment?
Yes
*  Has this patient been hospitalized within the prior 30 days at any 
hospital?
No
 
 
 
 
 
Coverage Notice
 
Reviewer: [email or web address] Leigh Ann Espinoza
 
Notice Issued Date-Time: 2020  12:02
Notice Type: IM Discharge Notice
 
 
Notice Delivered To: Patient
Relationship to Patient: Self
Representative Name: 
 
Delivery Method: HAND - Hand Delivered
Roseann Days:
Prior Verbal Notification: 
 
Recipient Understood Notice: Yes
Recipient Signature: Yes
Med Rec Note Co-signed by Attending:
 
Coverage Notice Comment:  
 
Last DP export: 20   5:24 p
Patient Name: RYANNE BOGGS
Encounter #: [id number]
Page [phone]
 
 
 
 
 
Electronically Signed by BRYCE HUTCHINS on 20 at 191
 
 
 
 
 
 
**All edits/amendments must be made on the electronic document**
 
DICTATION DATE: 20     : SARWAT  20     
RPT#: [id number]                                DC DATE:20
                                               STATUS: DIS IN  
[facility]
 [place] [address]
***END OF REPORT***

## 2020-02-24 ENCOUNTER — HOSPITAL ENCOUNTER (INPATIENT)
Dept: HOSPITAL 84 - D.ER | Age: 45
LOS: 2 days | Discharge: HOME | DRG: 287 | End: 2020-02-26
Attending: INTERNAL MEDICINE | Admitting: INTERNAL MEDICINE
Payer: MEDICARE

## 2020-02-24 VITALS
BODY MASS INDEX: 27.46 KG/M2 | WEIGHT: 185.39 LBS | HEIGHT: 69 IN | BODY MASS INDEX: 27.46 KG/M2 | WEIGHT: 185.39 LBS | HEIGHT: 69 IN

## 2020-02-24 DIAGNOSIS — I50.23: ICD-10-CM

## 2020-02-24 DIAGNOSIS — N17.9: ICD-10-CM

## 2020-02-24 DIAGNOSIS — I42.9: ICD-10-CM

## 2020-02-24 DIAGNOSIS — D50.9: ICD-10-CM

## 2020-02-24 DIAGNOSIS — I11.0: Primary | ICD-10-CM

## 2020-02-24 DIAGNOSIS — F17.213: ICD-10-CM

## 2020-02-24 LAB
ALBUMIN SERPL-MCNC: 2.8 G/DL (ref 3.4–5)
ALP SERPL-CCNC: 91 U/L (ref 30–120)
ALT SERPL-CCNC: 59 U/L (ref 10–68)
ANION GAP SERPL CALC-SCNC: 13.4 MMOL/L (ref 8–16)
APTT BLD: 30 SECONDS (ref 22.8–39.4)
BASOPHILS NFR BLD AUTO: 0.6 % (ref 0–2)
BILIRUB SERPL-MCNC: 0.74 MG/DL (ref 0.2–1.3)
BUN SERPL-MCNC: 16 MG/DL (ref 7–18)
CALCIUM SERPL-MCNC: 8.6 MG/DL (ref 8.5–10.1)
CHLORIDE SERPL-SCNC: 106 MMOL/L (ref 98–107)
CK MB SERPL-MCNC: 1.2 U/L (ref 0–3.6)
CK SERPL-CCNC: 186 UL (ref 21–232)
CO2 SERPL-SCNC: 24.4 MMOL/L (ref 21–32)
CREAT SERPL-MCNC: 1.4 MG/DL (ref 0.6–1.3)
EOSINOPHIL NFR BLD: 1 % (ref 0–7)
ERYTHROCYTE [DISTWIDTH] IN BLOOD BY AUTOMATED COUNT: 16.4 % (ref 11.5–14.5)
GLOBULIN SER-MCNC: 4.3 G/L
GLUCOSE SERPL-MCNC: 126 MG/DL (ref 74–106)
HCT VFR BLD CALC: 40.6 % (ref 42–54)
HGB BLD-MCNC: 13.2 G/DL (ref 13.5–17.5)
IMM GRANULOCYTES NFR BLD: 0 % (ref 0–5)
INR PPP: 1.45 (ref 0.85–1.17)
LYMPHOCYTES NFR BLD AUTO: 41.7 % (ref 15–50)
MCH RBC QN AUTO: 24 PG (ref 26–34)
MCHC RBC AUTO-ENTMCNC: 32.5 G/DL (ref 31–37)
MCV RBC: 73.8 FL (ref 80–100)
MONOCYTES NFR BLD: 12.8 % (ref 2–11)
NEUTROPHILS NFR BLD AUTO: 43.9 % (ref 40–80)
NT-PROBNP SERPL-MCNC: 4126 PG/ML (ref 0–125)
OSMOLALITY SERPL CALC.SUM OF ELEC: 281 MOSM/KG (ref 275–300)
PLATELET # BLD: 236 10X3/UL (ref 130–400)
PMV BLD AUTO: 10.3 FL (ref 7.4–10.4)
POTASSIUM SERPL-SCNC: 3.8 MMOL/L (ref 3.5–5.1)
PROT SERPL-MCNC: 7.1 G/DL (ref 6.4–8.2)
PROTHROMBIN TIME: 17.5 SECONDS (ref 11.6–15)
RBC # BLD AUTO: 5.5 10X6/UL (ref 4.2–6.1)
SODIUM SERPL-SCNC: 140 MMOL/L (ref 136–145)
TROPONIN I SERPL-MCNC: < 0.017 NG/ML (ref 0–0.06)
WBC # BLD AUTO: 4.8 10X3/UL (ref 4.8–10.8)

## 2020-02-24 NOTE — HEMODYNAMI
PATIENT:RYANNE BOGGS                                MEDICAL RECORD: [id number]
: 77                                            LOCATION:[facility]     D.2136
ACCT# [id number]                                       ADMISSION DATE: 20
 
 
 Generatedon:202016:42
Patient name: RYANNE BOGGS Patient #: [id number] Visit #: [id number] SSN: YOB: 1975
Date of study: 2020
Page: Of
Hemodynamic Procedure Report
****************************
Patient Data
Patient Demographics
Procedure consent was obtained
First Name: RYANNE          Gender: Male
Last Name: FLAKITA         : 1975
Middle Initial: E           Age: 44 year(s)
Patient #: [id number]       Race: Black
Visit #: [id number]
Accession #:
[id number]
Additional ID: [id number]
Contact details
Address: [address] [place] [address]       Phone: [phone]
State: [place]
City: [place]
Zip code: [address]
Past Medical History
History of disease
Date         Diagnosis          Comments
CHF
Allergies: No known allergies
Admission
Admission Data
Admission Date: 2020   Admission Time: 20:48
Admit Source: Other
Room #: D.2136
Height (in.): 69            BSA: 2 (m2)
Height (cm.): 175.26        BMI: 27.32 (kg/m2)
Weight (lbs.): 185
Weight (kg.): 83.91
Lab Results
Lab Result Date: 2020  Lab Result Time: 0:00
Biochemistry
Name         Units    Result                Min      Max
Creatinine   mg/dl    1.5      --(----)-*   0.6      1.3
eGFR AFRICAN ml/min   66       *-(----)--   90       120
AM
CBC
Name         Units    Result                Min      Max
Hematocrit   %        41.9     -*(----)--   42       54
Hemoglobin   g/dl     13.2     -*(----)--   13.5     17.5
Procedure
Procedure Types
Cath Procedure
Diagnostic Procedure
[facility] w/Coronaries
 
Sedation Charges
Moderate Sedation up to 15 minutes
Procedure Description
Procedure Date
Procedure Date: 2020
Procedure Start Time: 16:28
Procedure End Time: 16:39
Procedure Staff
Name                            Function
Kodi Collins MD                   Performing Physician
Nataly Briseno RT               Scrub
Trini Dumont RT               Monitor
Yaneth Nicole RT              Circulator
Romana Sousa RN                Nurse
Procedure Data
Cath Procedure
Fluoroscopy
Diagnostic fluoroscopy      Total fluoroscopy Time: 1.9
time: 1.9 min               min
Diagnostic fluoroscopy      Total fluoroscopy dose: 427
dose: 427 mGy               mGy
Contrast Material
Contrast Material Type                       Amount (ml)
Isovue 300                                   63
Entry Location
Entry     Primary  Successful  Side  Size  Upsize Upsize Entry    Closure Succes
sful  Closure
Location                             (Fr)  1 (Fr) 2 (Fr) Remarks  Device        
      Remarks
Femoral                        Right 5 Fr                         Exoseal
artery
Estimated blood loss: 5 ml
Diagnostic catheters
Device Type               Used For           End Catheter
Placement
MULTIPACK JL 4.0 5Fr      Procedure
catheter
MULTIPACK 3DRC 5Fr        Procedure
catheter
MULTIPACK Pigtail 5 Fr    Procedure
catheter
Procedure Complications
No complications
Procedure Medications
Medication           Administration Route Dosage
0.9% NaCl            I.V.                 100 ml/hr
Oxygen               etCO2 Nasal cannula  2 l/min
Heparin Flush Bag    added to field       2 bags
(1000units/500ml NS)
Lidocaine 2%         added to field       20
Versed               I.V.                 2 mg
Fentanyl             I.V.                 100 mcg
Hemodynamics
Rest
BSA: 2 (m2) HGB: 13.2 (g/dl) O2 Consumption: Estimated: 255.36 (ml/min) O2 Consu
mption
indexed: Estimated:127.68 (ml/min/m) Heart Rate: 87 (bpm)
Pressure Samples
Time     Site     Value (mmHg) Purpose      Heart      Use
Rate(bpm)
 
16:35    LV       85/11,22     Snapshot     87
16:35    AO       90/64(74)    Pullback     81
16:35    LV       97/14,26     Pullback     81
Gradients
Valve  Time  Site 1   Site 2    Mean    SEP/DFP    Peak To Heart  Use
(mmHg)  (sec/min)  Peak    Rate
(mmHg)  (bpm)
Aortic 16:35 LV       AO        11      17         7       81
97/14,26 90/64(74)
Calculations
Valve    P-P      Mean      Valve     Index  Valve    Source
Name              Gradient  Area             Flow
(cm2)
Aortic   7        11
7        11
Snapshots
Pre Cath      Intra         NCS           Post Cath
Vital Signs
Time     Heart  Resp   SPO2 etCO2   NIBP       Rhythm  Pain    Sedation
Rate   (ipm)  (%)  (mmHg)  (mmHg)             Status  Level
(bpm)
16:16:06 86     16     100  13.4    108/74(87) NSR     0 (11)  10(A)
, No
pain
16:20:11 90     20     96   26.9    106/73(87) NSR     0 (11)  10(A)
, No
pain
16:24:19 89     15     95   28.4    101/68(81) NSR     0 (11)  10(A)
, No
pain
16:28:27 81     14     97   18.7    104/67(82) NSR     0 (11)  10(A)
, No
pain
16:32:35 82     15     96   23.1    105/71(97) NSR     0 (11)  10(A)
, No
pain
16:36:43 82     16     97   24.6    107/71(91) NSR     0 (11)  10(A)
, No
pain
Medications
Time     Medication       Route   Dose  Verified Delivered Reason     Notes  Eff
ectiveness
by       by
16:14:26 0.9% NaCl        I.V.    100   Darryl  Darryl   Per
ml/hr Marga Gresham   physician
RN       RN
16:14:34 Oxygen           etCO2   2     Darryl  Darryl   for low 02
Nasal   l/min Lorigan  Lorigan   sats
cannula       RN       RN
16:14:45 Heparin Flush    added   2     Darryl  Darryl   used for
Bag              to      bags  Lorigan  Lorigan   procedure
(1000units/500ml field         RN       RN
NS)
16:14:55 Lidocaine 2%     added   20ml  Darryl  Darryl   for local
to      vial  Lorigan  Lorigan   anesthetic
field         RN       RN
16:24:49 Versed           I.V.    2 mg  Darryl  Darryl   for
Lorigan  Lorigan   sedation
RN       RN
16:24:57 Fentanyl         I.V.    100   Darryl  Darryl   for
 
mcg   Lorigan  Lorigan   sedation
RN       RN
Procedure Log
Time     Note
15:26:58 Informed consent obtained and on chart
15:29:23 Admit Source: Other
15:30:34 **ACC** Patient presents with Symptoms unlikely to be
ischemic CCS Anginal Class 3--Marked limitation of
physical activity, angina occurs with ordinary
activity..
15:30:39 Procedure Status Urgent Heart Cath (IP).
15:30:42 Time tracking: Regular hours (M-F 7:00 - 5:00)
15:31:02 Plan of Care:Hemodynamics will remain stable., Cardiac
rhythm will remain stable., Comfort level will be
maintained., Respiratory function will remain
adequate., Patient/ family verbilizes understanding of
procedure., Procedure tolerated without complication.,
Recovers from procedure without complications..
15:31:15 Full Disclosure recording started
15:31:20 H&P Date Dictated: 2020 Within 30 days and on
chart..
15:31:33 Patient allergic to No known allergies
15:31:36 Is the patient allergic to Iodine/contrast media? No.
15:31:38 Was the patient premedicated? N/A
15:35:29 Patient Height : 69 inches
15:35:33 Patient Weight : 185 lbs
15:36:22 Lab Result : Hemoglobin 13.2 g/dl
15:36:22 Lab Result : eGFR AFRICAN AM 66 ml/min
15:36:22 Lab Result : Creatinine 1.5 mg/dl
15:36:22 Lab Result : Hematocrit 41.9 %
15:39:50 Stress Test: no; N/A ?
15:39:54 Risk of Mortality: 0.9
15:39:57 Risk of blood transfusion: 0.9
15:40:00 Risk of DINA: 5.5
15:59:46 Darryl Gresham RN sent for patient. Start room use.
16:07:41 Patient received from Med II to CCL 1 Alert and
oriented. Tansferred to table in Supine position.
16:07:43 Warm blankets applied, and yoselin hugger turned on for
patient comfort.
16:07:44 Correct patient and procedure confirmed by team.
16:07:44 ECG and BP/O2 sat monitors applied to patient.
16:14:26 0.9% NaCl 100 ml/hr I.V. was administered by Darryl Gresham RN; Per physician; Verbal order read back and
verified.
16:14:34 Oxygen 2 l/min etCO2 Nasal cannula was administered by
Darryl Gresham RN; for low 02 sats; Verbal order read
back and verified.
16:14:45 Heparin Flush Bag (1000units/500ml NS) 2 bags added to
field was administered by Darryl Gresham RN; used for
procedure; Verbal order read back and verified.
16:14:55 Lidocaine 2% 20ml vial added to field was administered
by Darryl Gresham RN; for local anesthetic; Verbal
order read back and verified.
16:15:02 Vital chart was started
16:15:08 Baseline sample Acquired.
16:15:16 Pre-procedure instructions explained to patient.
16:15:18 Pre-op teaching completed and patient verbalized
understanding.
16:15:21 Family unavailable.
16:15:23 Patient NPO since Midnight.
 
16:15:27 Is patient on blood thinner?N/A
16:15:30 Patient diabetic? No.
16:15:31 If diabetic: On Metformin? N/A
16:15:35 Snore? Yes
16:15:36 Sleep apnea? Yes
16:15:37 Deviated septum? No
16:15:39 Opens mouth fully? Yes
16:15:40 Sticks out tongue? Yes
16:15:43 Airway obstruction? No ?
16:15:47 Dentures? No ?
16:15:55 Pre procedure: right dorsailis pedis pulse 2+ Normal;
easily identifiable; not easily obliterated
16:15:58 Patient pain scale 0/10 ?.
16:16:07 IV patent on arrival in right wrist with 0.9% NaCl at
[facility].
16:16:14 Right groin area was prepped with chlora-prep and
draped in sterile fashion
16:16:16 Alarms reviewed by R. N.
16:16:17 Sharps counted by scrub and verified by R.N.
16:16:23 Use device set Femoral Dx
16:16:25 ACIST Syringe (60463) opened to sterile field.
16:16:25 Bag Decanter (2002S) opened to sterile field.
16:16:26 Medline Cath Pack ([id number]) opened to sterile field.
16:16:28 ACIST Hand Control (47536) opened to sterile field.
16:16:30 ACIST Manifold (72277) opened to sterile field.
16:16:30 DIAGNOSTIC Multipack 5Fr catheter set ([id number]) opened
to sterile field.
16:16:32 SHEATH 5FR [place] (IKA126) opened to sterile field.
16:16:33 EMERALD Guide Wire (327-564) opened to sterile field.
16:23:40 --------ALL STOP TIME OUT------
16:23:41 Final Timeout: patient, procedure, and site verified
with staff and physician. All members of the team are
in agreement.
16:23:43 Right groin site verified by team.
16:24:00 Fire Safety Assessment: A--An alcohol-based skin
anteseptic being used preoperatively., C--Open oxygen
or nitrous oxide is being used., D--An ESU, laser, or
fiber-optic light is being used.
16:24:05 Physical assessment completed. ASA score P 2 - A
patient with mild systemic disease as per Kodi Collins MD.
16:24:10 2) 60-89 Mildly reduced kidney function, and other
findings (as for stage 1) point to kidney disease.
16:24:14 Maximum allowable contrast dose (3.7 X eGFR X 0.75)183
ml.
16:24:18 Sedation plan: IV Moderate Sedation Medication:Versed,
Fentanyl
16:24:49 Versed 2 mg I.V. was administered by Darryl Gresham RN; for sedation; Verbal order read back and verified.
16:24:57 Fentanyl 100 mcg I.V. was administered by Darryl Gresham RN; for sedation; Verbal order read back and
verified.
16:26:41 Zero performed for pressure channel P1
16:27:34 Procedure started.
16:28:05 Local anesthetic to right femoral artery with
Lidocaine 2% by Kodi Collins MD.**INITIAL ACCESS ONLY**
16:29:01 A 5 Fr sheath was inserted into the Right Femoral
artery
16:29:52 A MULTIPACK JL 4.0 5Fr catheter was advanced over the
wire and used for Procedure.
 
16:31:00 LCA angiography performed.
16:31:36 Catheter exchanged over wire.
16:31:41 A MULTIPACK 3DRC 5Fr catheter was advanced over the
wire and used for Procedure.
16:33:08 RCA angiography performed.
16:33:09 Catheter exchanged over wire.
16:33:19 A MULTIPACK Pigtail 5 Fr catheter was advanced over
the wire and used for Procedure.
16:34:41 LV gram done using URBAN
16::43 Injector settings: Ml/sec: 10, Volume: 20,
16:35:16 LV hemodynamics recorded.
16:35:29 EF : 15 %
16:35:47 Catheter removed.
16:36:05 EXOSEAL 5Fr () opened to sterile field.
16:37:20 Sheath removed intact; hemostasis achieved with
Exoseal to the Right Femoral artery.
16:37:25 Procedure ended.(Physican Out)
16:37:36 Fluoroscopy time 01.90 minutes.
16:37:40 Fluoroscopy dose: 427 mGy
16:37:40 Flurop Dose total: 427
16:37:46 Dose Area Product 15965 mGy/cm.
16:38:13 Contrast amount:Isovue 300 63ml.
16:38:14 Maximum allowable dose exceeded? No.
16:38:15 Sharps counted by scrub and verified by R.N.
16:38:18 Post-op/insertion site Right Femoral artery dressed
using a 4 x 4 and Tegaderm.
16:38:20 Post-procedure physical assessment completed. ASA
score P 2 - A patient with mild systemic disease as
per Kodi Collins MD.
16:38:24 Post procedure rhythm: sinus rhythm
16:38:26 Estimated blood loss: 5 ml
16:38:28 Post procedure instruction explained to
patient.Patient verbalizes understanding.
16:38:28 Patient needs reinforcement of post procedure
teaching.
16:38:44 Procedure type changed to Cath procedure, Diagnostic
procedure, LHC, [facility] w/Coronaries, Sedation Charges,
Moderate Sedation up to 15 minutes
16:39:01 Procedure and supply charges have been captured,
reviewed, submitted and are correct.
16:39:05 Procedure Complication : No complications
16:39:07 Vital chart was stopped
16:39:08 [facility] Findings: mild to moderate CAD (<70%)
16:39:12 Operative report dictated upon procedure completion.
16:39:12 See physician's report for complete and final results.
16:39:15 Report given to PCU.
16:39:18 Patient transfered to PCU with Bed.
16:39:29 Procedure ended.
16:39:29 Full Disclosure recording stopped
16:39:32 End room use (Document Last)
16:40:14 End room use (Document Last)
16:40:52 End room use (Document Last)
Device Usage
Item Name   Manufacture  Quantity  Catalog    Hospital Part    Current Minimal L
ot# /
Number     Charge   Number  Stock   Stock   Serial#
Code
ACIST       Acist        1         30734      [id number]   [id number]  [id number]  20
Syringe     Medical
(66256)     Systems Inc
 
Bag         [employer]tek     1               661942   77798   818045  5
Decanter    Medical Inc.
()
Medline     Medline      1         [id number]  [id number]   25474   652847  5
Cath Pack
([id number])
ACIST Hand  Acist        1         96440      824907   115638  243946  5
Control     Medical
(01088)     Systems Inc
ACIST       Acist        1         95151      877101   680692  266051  5
Manifold    Medical
(06747)     Systems Inc
DIAGNOSTIC  Cardinal     1         [id number]     723347   35551   187724  30
Multipack   "[employer]
5Fr
catheter
set
(VS0080)
SHEATH 5FR  Terumo       1         MKY386     987513   871954  478809  5
[place]
(FXD641)
EMERALD     Cardinal     1         502-818    331493   713980  366199  5
Guide Wire  [facility]
(045-961)
MULTIPACK   Cardinal     1                                     156714  5
JL 4.0 5Fr  [facility]
catheter
MULTIPACK   Cardinal     1                                     199296  5
3DRC 5Fr    [facility]
catheter
MULTIPACK   Cardinal     1                                     [id number]  5
Pigtail 5   Health
Fr catheter
EXOSEAL 5Fr Cardinal     1               486066   523202  777449  10
()     Health
Signature Audit [place]
Stage           Time        Signature      Unsigned
Intra-Procedure 2020   Trini Dumont
4:40:14 PM  RT(R)
Intra-Procedure 2020   Romana Sousa
4:40:52 PM  RN
Intra-Procedure 2020   Kodi Collins MD
4:42:11 PM
 
 
 
 
 
 
 
 
 
 
 
 
[id number] [place] [address]

## 2020-02-25 LAB
ALT SERPL-CCNC: 57 U/L (ref 10–68)
AMPHETAMINES UR QL SCN: NEGATIVE QUAL
ANION GAP SERPL CALC-SCNC: 10.9 MMOL/L (ref 8–16)
BARBITURATES UR QL SCN: NEGATIVE QUAL
BASOPHILS NFR BLD AUTO: 0.4 % (ref 0–2)
BENZODIAZ UR QL SCN: POSITIVE QUAL
BILIRUB SERPL-MCNC: NEGATIVE MG/DL
BUN SERPL-MCNC: 19 MG/DL (ref 7–18)
BZE UR QL SCN: NEGATIVE QUAL
CALCIUM SERPL-MCNC: 8.2 MG/DL (ref 8.5–10.1)
CANNABINOIDS UR QL SCN: NEGATIVE QUAL
CHLORIDE SERPL-SCNC: 106 MMOL/L (ref 98–107)
CHOLEST/HDLC SERPL: 4.7 RATIO (ref 2.3–4.9)
CO2 SERPL-SCNC: 31.2 MMOL/L (ref 21–32)
CREAT SERPL-MCNC: 1.5 MG/DL (ref 0.6–1.3)
EOSINOPHIL NFR BLD: 0.9 % (ref 0–7)
ERYTHROCYTE [DISTWIDTH] IN BLOOD BY AUTOMATED COUNT: 16.5 % (ref 11.5–14.5)
FERRITIN SERPL-MCNC: 59 NG/ML (ref 3–244)
GLUCOSE SERPL-MCNC: 112 MG/DL (ref 74–106)
GLUCOSE SERPL-MCNC: NEGATIVE MG/DL
HCT VFR BLD CALC: 41.9 % (ref 42–54)
HDLC SERPL-MCNC: 23 MG/DL (ref 32–96)
HGB BLD-MCNC: 13.2 G/DL (ref 13.5–17.5)
IMM GRANULOCYTES NFR BLD: 0.2 % (ref 0–5)
IRON SERPL-MCNC: 29 UG/DL (ref 35–150)
KETONES UR STRIP-MCNC: NEGATIVE MG/DL
LDL-HDL RATIO: 3 RATIO (ref 1.5–3.5)
LDLC SERPL-MCNC: 70 MG/DL (ref 0–100)
LYMPHOCYTES NFR BLD AUTO: 40 % (ref 15–50)
MCH RBC QN AUTO: 23.6 PG (ref 26–34)
MCHC RBC AUTO-ENTMCNC: 31.5 G/DL (ref 31–37)
MCV RBC: 75 FL (ref 80–100)
MONOCYTES NFR BLD: 13.1 % (ref 2–11)
NEUTROPHILS NFR BLD AUTO: 45.4 % (ref 40–80)
NITRITE UR-MCNC: NEGATIVE MG/ML
OPIATES UR QL SCN: NEGATIVE QUAL
OSMOLALITY SERPL CALC.SUM OF ELEC: 289 MOSM/KG (ref 275–300)
PCP UR QL SCN: NEGATIVE QUAL
PH UR STRIP: 7 [PH] (ref 5–6)
PLATELET # BLD: 272 10X3/UL (ref 130–400)
PMV BLD AUTO: 10.5 FL (ref 7.4–10.4)
POTASSIUM SERPL-SCNC: 4.1 MMOL/L (ref 3.5–5.1)
RBC # BLD AUTO: 5.59 10X6/UL (ref 4.2–6.1)
SAO2 % BLD FROM PO2: 7 % (ref 15–55)
SODIUM SERPL-SCNC: 144 MMOL/L (ref 136–145)
SP GR UR STRIP: 1.01 (ref 1–1.02)
TIBC SERPL-MCNC: 385 UG/DL (ref 260–445)
TRIGL SERPL-MCNC: 79 MG/DL (ref 30–200)
UIBC SERPL-MCNC: 356 UG/DL (ref 150–375)
UROBILINOGEN UR-MCNC: NORMAL MG/DL
WBC # BLD AUTO: 4.5 10X3/UL (ref 4.8–10.8)

## 2020-02-25 PROCEDURE — B2111ZZ FLUOROSCOPY OF MULTIPLE CORONARY ARTERIES USING LOW OSMOLAR CONTRAST: ICD-10-PCS | Performed by: INTERNAL MEDICINE

## 2020-02-25 PROCEDURE — 4A023N7 MEASUREMENT OF CARDIAC SAMPLING AND PRESSURE, LEFT HEART, PERCUTANEOUS APPROACH: ICD-10-PCS | Performed by: INTERNAL MEDICINE

## 2020-02-25 PROCEDURE — B2151ZZ FLUOROSCOPY OF LEFT HEART USING LOW OSMOLAR CONTRAST: ICD-10-PCS | Performed by: INTERNAL MEDICINE

## 2020-02-25 NOTE — NUR
RECIEVED REPORT FROM ER. ARRIVED TO ROOM ON A STRETCHER. IV TO RIGHT FA SL.
PACEMAKER TO LT CHEST. ALERT AND ORIENTED X4. C/O HIS MEDICATIONS MAKING HIS
STOMACH HURT. EDUCATED ON EATING CRACKERS OR SOMETHING BEFORE TAKING HIS
MEDICATION TO HELP WITH STOMACH UPSET. STATES HIS APPETITE IS NOT GOOD.
REPORTED FROM ER THAT HE RECEIVED A SANDWICH BOX AND %. WHEN HE CAME TO
FLOOR AND REQUESTED ANOTHER SANDWICH BOX. ATE ALL EXCEPT 1/2 SANDWICH. WHEN
BEING QUESTIONED ABOUT HIS SKIN HE STATED "HE WAS COUGHING SOMETHING UP THE
OTHER DAY AND BLEW SOMETHING OUT OF THE BACK OF HIS. SCALP IS INTACT.
REQUESTED A SODA. SODA GIVEN TO HIM. BROTHER AT BEDSIDE. DENIES ANY OTHER
NEEDS AT THIS TIME.

## 2020-02-25 NOTE — NUR
WENT TO ASSSES PT GROIN. PT WAS SITTING UP EATING HIS DINNER AND HE HAD HIS
PANTS BACK ON. STRESSED TO PT THE IMPORTANCE OF LAYING FLAT FOR 2HRS AND
EXPLANIED THE RISK OF OF NOT LAYING FLAT. PT VERBALIZED UNDERSTANDING. RT
GROIN DRESSING CDI NO CHANGES FROM PREVIOUS ASSESSMENT. CALL LIGHT IN REACH,
NAD NOTED,WILL CONTINUE TO MONITOR.

## 2020-02-25 NOTE — NUR
EVENING ROUNDS COMPLETE. PT LAYING IN BED, NO SIGNS OF DISTRESS. AAOX4. PT
DENIES ANY PAIN OR NEEDS AT THIS TIME. CL IN REACH, BED IN LOWEST POSITION.

## 2020-02-25 NOTE — NUR
PER PRICILA VILLAREAL, PT CAN HAVE BREAKFAST, DOUBLE PORTION, AND THEN HEART
CATH AT 1300. CONSENTS SIGNED BY PT AND PLACED ON CHART. PT DENIES ANY NEEDS
AT THIS TIME. CALL LIGHT IN REACH, NAD NOTED, WILL CONTINUE TO MONITOR.

## 2020-02-25 NOTE — NUR
PT PULLED IV OUT, CATHETER WAS HANGING ON THE IV POLE WITH CATHETER TIP
INTACT. NEW 2OG IV STARTED TO RT FA X1 STICK. BUMEX DRIP STARTED AGAIN. PT
DENIES ANY NEEDS AT THIS TIME. CALL LIGHT IN REACH.

## 2020-02-25 NOTE — NUR
RECEIVED PT BACK TO ROOM 2136. VITAL SIGNS STABLE, PLACED PT ON FREQUENT VITAL
SIGNS. RT GROIN DRESSING CDI, NO S/S OF BLEEDING OR HEMATOMA NOTED. INSTRUCTED
PT TO REMAIN LAYING FLAT FOR 2HRS. PROVIDED PT WITH EYE WATER. PT DENIES ANY
OTHER NEEDS AT THIS TIME. CALL LIGHT IN REACH, NAD NOTED,W ILL CONTINUE TO
MONITOR.

## 2020-02-26 VITALS — SYSTOLIC BLOOD PRESSURE: 81 MMHG | DIASTOLIC BLOOD PRESSURE: 70 MMHG

## 2020-02-26 LAB
ANION GAP SERPL CALC-SCNC: 9.9 MMOL/L (ref 8–16)
BASOPHILS NFR BLD AUTO: 0.2 % (ref 0–2)
BUN SERPL-MCNC: 16 MG/DL (ref 7–18)
CALCIUM SERPL-MCNC: 8.4 MG/DL (ref 8.5–10.1)
CHLORIDE SERPL-SCNC: 101 MMOL/L (ref 98–107)
CO2 SERPL-SCNC: 32.4 MMOL/L (ref 21–32)
CREAT SERPL-MCNC: 1.3 MG/DL (ref 0.6–1.3)
EOSINOPHIL NFR BLD: 1.2 % (ref 0–7)
ERYTHROCYTE [DISTWIDTH] IN BLOOD BY AUTOMATED COUNT: 16.8 % (ref 11.5–14.5)
GLUCOSE SERPL-MCNC: 114 MG/DL (ref 74–106)
HCT VFR BLD CALC: 37.3 % (ref 42–54)
HGB BLD-MCNC: 11.7 G/DL (ref 13.5–17.5)
IMM GRANULOCYTES NFR BLD: 0.2 % (ref 0–5)
LYMPHOCYTES NFR BLD AUTO: 38.7 % (ref 15–50)
MCH RBC QN AUTO: 23.5 PG (ref 26–34)
MCHC RBC AUTO-ENTMCNC: 31.4 G/DL (ref 31–37)
MCV RBC: 75.1 FL (ref 80–100)
MONOCYTES NFR BLD: 14.7 % (ref 2–11)
NEUTROPHILS NFR BLD AUTO: 45 % (ref 40–80)
OSMOLALITY SERPL CALC.SUM OF ELEC: 280 MOSM/KG (ref 275–300)
PLATELET # BLD: 256 10X3/UL (ref 130–400)
PMV BLD AUTO: 9.9 FL (ref 7.4–10.4)
POTASSIUM SERPL-SCNC: 3.3 MMOL/L (ref 3.5–5.1)
RBC # BLD AUTO: 4.97 10X6/UL (ref 4.2–6.1)
SODIUM SERPL-SCNC: 140 MMOL/L (ref 136–145)
WBC # BLD AUTO: 4.8 10X3/UL (ref 4.8–10.8)

## 2020-02-26 NOTE — NUR
PT CAME TO NURSES STATION AND STATED THAT HIS RIDE WAS ON THEIR WAY AND TOLD
HIM TO WAIT IN THE ED WAITING AREA. ANUM DELATORRE RN, ASKED PT IF HE NEW HOW
FAR AWAY WAS HIS RIDE FROM THE HOSPITAL AND PT STATED " I DONT KNOW BUT THEY
TOLD ME TO WAIT IN THE ED WAITING AREA." ANUM DELATORRE WALKED PT TO ER WAITING
AREA AND IS GOING TO WAIT WITH PT UNTIL RIDE ARRIVES. PT LEFT UNIT WITH ALL
BELONGINGS.

## 2020-02-26 NOTE — NUR
PROVIDED VERBAL AND WRITTEN DISCHARGE TEACHING TO PT, WHO VERBALIZED
UNDERSTANDING REGARDING TEACHING. D/C RT FA IV WITH CATHETET TIP INTACT. HEART
MONITOR REMOVED AND TAKEN TO MONITOR TECH. PT WAITING ON RIDE, INSTRUCTED PT
TO NOTIFY NURSE WHEN RIDE IS HERE.
 
PT CAME UP TO NURSES STATION ASKING WHERE A WAITING ROOM WAS AT. ASKED PT IF
HIS RIDE WAS HERE AND PT STATED NO, BUT THEY TOLD TO WAIT IN THE WAITING AREA.
THIS NURSE TOLD PT THAT PER POLICY HE HAS TO WAIT IN HIS ROOM UNTIL HIS RIDE
IS HERE. PT STATED AGAIN THAT HE NEEDS TO GO WAIT IN THE WAITING AREA. NURSE
ANUM DELATORRE RN TOLD PT THAT PER POLICY HIS HAS TO WAIT IN THE ROOM BECAUSE HE
IS STILL OUR RESPONSIBILITY UNTIL HIS RIDE IS HERE TO PICK HIM UP. ONCE HIS
RIDE IS HERE THEN WE CAN WHEEL HIM DOWN TO THE FRONT OR WALK HIM DOWN. PT
STATED OK AND WALKED BACK TO HIS ROOM.

## 2020-03-01 ENCOUNTER — HOSPITAL ENCOUNTER (OUTPATIENT)
Dept: HOSPITAL 84 - D.LABREF | Age: 45
Discharge: HOME | End: 2020-03-01
Attending: INTERNAL MEDICINE
Payer: MEDICARE

## 2020-03-01 VITALS — BODY MASS INDEX: 27.3 KG/M2

## 2020-03-01 DIAGNOSIS — I50.9: Primary | ICD-10-CM

## 2020-03-16 ENCOUNTER — HOSPITAL ENCOUNTER (INPATIENT)
Dept: HOSPITAL 84 - D.ER | Age: 45
LOS: 4 days | Discharge: HOME | DRG: 291 | End: 2020-03-20
Attending: FAMILY MEDICINE | Admitting: FAMILY MEDICINE
Payer: MEDICARE

## 2020-03-16 VITALS — DIASTOLIC BLOOD PRESSURE: 110 MMHG | SYSTOLIC BLOOD PRESSURE: 132 MMHG

## 2020-03-16 VITALS — SYSTOLIC BLOOD PRESSURE: 132 MMHG | DIASTOLIC BLOOD PRESSURE: 110 MMHG

## 2020-03-16 VITALS
HEIGHT: 69 IN | WEIGHT: 167.85 LBS | BODY MASS INDEX: 24.86 KG/M2 | BODY MASS INDEX: 24.86 KG/M2 | WEIGHT: 167.85 LBS | HEIGHT: 69 IN

## 2020-03-16 VITALS — SYSTOLIC BLOOD PRESSURE: 100 MMHG | DIASTOLIC BLOOD PRESSURE: 68 MMHG

## 2020-03-16 DIAGNOSIS — F17.213: ICD-10-CM

## 2020-03-16 DIAGNOSIS — D50.9: ICD-10-CM

## 2020-03-16 DIAGNOSIS — E11.9: ICD-10-CM

## 2020-03-16 DIAGNOSIS — I50.23: ICD-10-CM

## 2020-03-16 DIAGNOSIS — I27.20: ICD-10-CM

## 2020-03-16 DIAGNOSIS — I08.1: ICD-10-CM

## 2020-03-16 DIAGNOSIS — F31.9: ICD-10-CM

## 2020-03-16 DIAGNOSIS — I11.0: Primary | ICD-10-CM

## 2020-03-16 DIAGNOSIS — J44.1: ICD-10-CM

## 2020-03-16 DIAGNOSIS — J96.01: ICD-10-CM

## 2020-03-16 LAB
ALBUMIN SERPL-MCNC: 3.7 G/DL (ref 3.4–5)
ALP SERPL-CCNC: 99 U/L (ref 30–120)
ALT SERPL-CCNC: 73 U/L (ref 10–68)
ANION GAP SERPL CALC-SCNC: 15.8 MMOL/L (ref 8–16)
APTT BLD: 29.2 SECONDS (ref 22.8–39.4)
BASOPHILS NFR BLD AUTO: 0.5 % (ref 0–2)
BILIRUB SERPL-MCNC: 1.27 MG/DL (ref 0.2–1.3)
BUN SERPL-MCNC: 10 MG/DL (ref 7–18)
CALCIUM SERPL-MCNC: 9.1 MG/DL (ref 8.5–10.1)
CHLORIDE SERPL-SCNC: 103 MMOL/L (ref 98–107)
CK MB SERPL-MCNC: 1.4 U/L (ref 0–3.6)
CK MB SERPL-MCNC: 1.6 U/L (ref 0–3.6)
CK MB SERPL-MCNC: 1.8 U/L (ref 0–3.6)
CK SERPL-CCNC: 151 UL (ref 21–232)
CK SERPL-CCNC: 173 UL (ref 21–232)
CK SERPL-CCNC: 175 UL (ref 21–232)
CO2 SERPL-SCNC: 22.3 MMOL/L (ref 21–32)
CREAT SERPL-MCNC: 1.3 MG/DL (ref 0.6–1.3)
EOSINOPHIL NFR BLD: 0.7 % (ref 0–7)
ERYTHROCYTE [DISTWIDTH] IN BLOOD BY AUTOMATED COUNT: 17.5 % (ref 11.5–14.5)
EST. AVERAGE GLUCOSE BLD GHB EST-MCNC: 166 MG/DL (ref 74–154)
GLOBULIN SER-MCNC: 4.7 G/L
GLUCOSE SERPL-MCNC: 97 MG/DL (ref 74–106)
HCT VFR BLD CALC: 41.7 % (ref 42–54)
HGB BLD-MCNC: 13.1 G/DL (ref 13.5–17.5)
IMM GRANULOCYTES NFR BLD: 0.4 % (ref 0–5)
INR PPP: 1.45 (ref 0.85–1.17)
LYMPHOCYTES NFR BLD AUTO: 36.2 % (ref 15–50)
MCH RBC QN AUTO: 22.8 PG (ref 26–34)
MCHC RBC AUTO-ENTMCNC: 31.4 G/DL (ref 31–37)
MCV RBC: 72.6 FL (ref 80–100)
MONOCYTES NFR BLD: 11.6 % (ref 2–11)
NEUTROPHILS NFR BLD AUTO: 50.6 % (ref 40–80)
NT-PROBNP SERPL-MCNC: 4770 PG/ML (ref 0–125)
OSMOLALITY SERPL CALC.SUM OF ELEC: 272 MOSM/KG (ref 275–300)
PLATELET # BLD: 267 10X3/UL (ref 130–400)
POTASSIUM SERPL-SCNC: 4.1 MMOL/L (ref 3.5–5.1)
PROT SERPL-MCNC: 8.4 G/DL (ref 6.4–8.2)
PROTHROMBIN TIME: 17.5 SECONDS (ref 11.6–15)
RBC # BLD AUTO: 5.74 10X6/UL (ref 4.2–6.1)
SODIUM SERPL-SCNC: 137 MMOL/L (ref 136–145)
TROPONIN I SERPL-MCNC: < 0.017 NG/ML (ref 0–0.06)
WBC # BLD AUTO: 5.7 10X3/UL (ref 4.8–10.8)

## 2020-03-16 NOTE — NUR
EVENING ROUNDS COMPLETED. PT AA0X4, VSS, NO S/S OF RT DISTRESS. FSBS 118. NOT
TREATED AR SLIDING SCALE. PT RESTING COMFORTABLY. DENIES ANY FURTHER NEEDS AT
THIS TIME. WILL CPOC. CL WITHIN REACH.

## 2020-03-17 VITALS — DIASTOLIC BLOOD PRESSURE: 71 MMHG | SYSTOLIC BLOOD PRESSURE: 103 MMHG

## 2020-03-17 VITALS — DIASTOLIC BLOOD PRESSURE: 55 MMHG | SYSTOLIC BLOOD PRESSURE: 118 MMHG

## 2020-03-17 VITALS — DIASTOLIC BLOOD PRESSURE: 76 MMHG | SYSTOLIC BLOOD PRESSURE: 103 MMHG

## 2020-03-17 VITALS — SYSTOLIC BLOOD PRESSURE: 112 MMHG | DIASTOLIC BLOOD PRESSURE: 70 MMHG

## 2020-03-17 VITALS — DIASTOLIC BLOOD PRESSURE: 56 MMHG | SYSTOLIC BLOOD PRESSURE: 93 MMHG

## 2020-03-17 LAB
ALBUMIN SERPL-MCNC: 2.9 G/DL (ref 3.4–5)
ALP SERPL-CCNC: 83 U/L (ref 30–120)
ALT SERPL-CCNC: 56 U/L (ref 10–68)
ANION GAP SERPL CALC-SCNC: 13.6 MMOL/L (ref 8–16)
BASOPHILS NFR BLD AUTO: 0.4 % (ref 0–2)
BILIRUB SERPL-MCNC: 1.11 MG/DL (ref 0.2–1.3)
BUN SERPL-MCNC: 12 MG/DL (ref 7–18)
CALCIUM SERPL-MCNC: 8.5 MG/DL (ref 8.5–10.1)
CHLORIDE SERPL-SCNC: 104 MMOL/L (ref 98–107)
CK MB SERPL-MCNC: 1.4 U/L (ref 0–3.6)
CK SERPL-CCNC: 134 UL (ref 21–232)
CO2 SERPL-SCNC: 25.2 MMOL/L (ref 21–32)
CREAT SERPL-MCNC: 1.4 MG/DL (ref 0.6–1.3)
EOSINOPHIL NFR BLD: 0.8 % (ref 0–7)
ERYTHROCYTE [DISTWIDTH] IN BLOOD BY AUTOMATED COUNT: 17.3 % (ref 11.5–14.5)
GLOBULIN SER-MCNC: 4 G/L
GLUCOSE SERPL-MCNC: 91 MG/DL (ref 74–106)
HCT VFR BLD CALC: 36.3 % (ref 42–54)
HGB BLD-MCNC: 11.6 G/DL (ref 13.5–17.5)
IMM GRANULOCYTES NFR BLD: 0.2 % (ref 0–5)
LYMPHOCYTES NFR BLD AUTO: 33.2 % (ref 15–50)
MAGNESIUM SERPL-MCNC: 1.6 MG/DL (ref 1.8–2.4)
MCH RBC QN AUTO: 22.7 PG (ref 26–34)
MCHC RBC AUTO-ENTMCNC: 32 G/DL (ref 31–37)
MCV RBC: 71.2 FL (ref 80–100)
MONOCYTES NFR BLD: 14.9 % (ref 2–11)
NEUTROPHILS NFR BLD AUTO: 50.5 % (ref 40–80)
NT-PROBNP SERPL-MCNC: 3908 PG/ML (ref 0–125)
OSMOLALITY SERPL CALC.SUM OF ELEC: 277 MOSM/KG (ref 275–300)
PLATELET # BLD: 246 10X3/UL (ref 130–400)
PMV BLD AUTO: 10.7 FL (ref 7.4–10.4)
POTASSIUM SERPL-SCNC: 3.8 MMOL/L (ref 3.5–5.1)
PROT SERPL-MCNC: 6.9 G/DL (ref 6.4–8.2)
RBC # BLD AUTO: 5.1 10X6/UL (ref 4.2–6.1)
SODIUM SERPL-SCNC: 139 MMOL/L (ref 136–145)
TROPONIN I SERPL-MCNC: < 0.017 NG/ML (ref 0–0.06)
WBC # BLD AUTO: 4.8 10X3/UL (ref 4.8–10.8)

## 2020-03-17 NOTE — NUR
RESTS IN BED WITHOUT NEEDS VOICED.  IV PATENT WITH DOBUTREX GTT INFUSING AT
12CC/HR.  RESP UL ON 02 2L NC.  TELEMETRY .  CALL LIGHT IN REACH.  WILL
CONT. PLAN OF CARE.

## 2020-03-18 VITALS — DIASTOLIC BLOOD PRESSURE: 68 MMHG | SYSTOLIC BLOOD PRESSURE: 99 MMHG

## 2020-03-18 VITALS — DIASTOLIC BLOOD PRESSURE: 77 MMHG | SYSTOLIC BLOOD PRESSURE: 109 MMHG

## 2020-03-18 VITALS — SYSTOLIC BLOOD PRESSURE: 98 MMHG | DIASTOLIC BLOOD PRESSURE: 73 MMHG

## 2020-03-18 VITALS — SYSTOLIC BLOOD PRESSURE: 107 MMHG | DIASTOLIC BLOOD PRESSURE: 77 MMHG

## 2020-03-18 VITALS — DIASTOLIC BLOOD PRESSURE: 79 MMHG | SYSTOLIC BLOOD PRESSURE: 105 MMHG

## 2020-03-18 VITALS — SYSTOLIC BLOOD PRESSURE: 107 MMHG | DIASTOLIC BLOOD PRESSURE: 75 MMHG

## 2020-03-18 LAB
ALBUMIN SERPL-MCNC: 2.7 G/DL (ref 3.4–5)
ALP SERPL-CCNC: 70 U/L (ref 30–120)
ALT SERPL-CCNC: 43 U/L (ref 10–68)
ANION GAP SERPL CALC-SCNC: 13.2 MMOL/L (ref 8–16)
BASOPHILS NFR BLD AUTO: 0.6 % (ref 0–2)
BILIRUB SERPL-MCNC: 0.66 MG/DL (ref 0.2–1.3)
BUN SERPL-MCNC: 15 MG/DL (ref 7–18)
CALCIUM SERPL-MCNC: 7.9 MG/DL (ref 8.5–10.1)
CHLORIDE SERPL-SCNC: 104 MMOL/L (ref 98–107)
CO2 SERPL-SCNC: 23.7 MMOL/L (ref 21–32)
CREAT SERPL-MCNC: 1.2 MG/DL (ref 0.6–1.3)
EOSINOPHIL NFR BLD: 1 % (ref 0–7)
ERYTHROCYTE [DISTWIDTH] IN BLOOD BY AUTOMATED COUNT: 17.4 % (ref 11.5–14.5)
GLOBULIN SER-MCNC: 3.8 G/L
GLUCOSE SERPL-MCNC: 85 MG/DL (ref 74–106)
HCT VFR BLD CALC: 34.4 % (ref 42–54)
HGB BLD-MCNC: 10.6 G/DL (ref 13.5–17.5)
IMM GRANULOCYTES NFR BLD: 0.2 % (ref 0–5)
LYMPHOCYTES NFR BLD AUTO: 37 % (ref 15–50)
MAGNESIUM SERPL-MCNC: 1.8 MG/DL (ref 1.8–2.4)
MCH RBC QN AUTO: 22.1 PG (ref 26–34)
MCHC RBC AUTO-ENTMCNC: 30.8 G/DL (ref 31–37)
MCV RBC: 71.7 FL (ref 80–100)
MONOCYTES NFR BLD: 14.4 % (ref 2–11)
NEUTROPHILS NFR BLD AUTO: 46.8 % (ref 40–80)
OSMOLALITY SERPL CALC.SUM OF ELEC: 273 MOSM/KG (ref 275–300)
PLATELET # BLD: 188 10X3/UL (ref 130–400)
POTASSIUM SERPL-SCNC: 3.9 MMOL/L (ref 3.5–5.1)
PROT SERPL-MCNC: 6.5 G/DL (ref 6.4–8.2)
RBC # BLD AUTO: 4.8 10X6/UL (ref 4.2–6.1)
SODIUM SERPL-SCNC: 137 MMOL/L (ref 136–145)
WBC # BLD AUTO: 4.9 10X3/UL (ref 4.8–10.8)

## 2020-03-18 NOTE — NUR
PATIENT IS AWAKE, ALERT AND ORIENTED X 4. PATIENT IS STABLE AND VSS. PATIENT
DENIES ANY NEEDS OR PAIN. ASSESSMENT COMPLETED. WILL CONTINUE TO MONITOR. SR
UP X 2 BED IN LOW POSITION AND CALL LIGHT IN REACH.

## 2020-03-18 NOTE — NUR
REPORT RECEIVED FROM NIGHT SHIFT AND PATIENT CARE ASSUMED. PATIENT LAYING IN
BED ON LT SIDE WITH EYES CLOSED AND BREATHING EVENLY. FAMILY ASLEEP AT BS.
WILL CONTINUE WITH PLAN OF CARE. SR UP X 2  BED IN LOW POSITION AND CALL LIGHT
IN REACH.

## 2020-03-18 NOTE — NUR
PATIENT LYING IN BED. NO SIGNS OF ACUTE DISTRESS NOTED AT THIS TIME. IV IN R
HAND, NO SINGS OF REDNESS OR SWELLING, DOBUTAMINE DRIP. PATIENT REQUESTS ICE
CREAM. BED IN LOW POSITION, RAILS X2. BEDSIDE TABLE AND CALL LIGHT WITHIN
REACH.

## 2020-03-18 NOTE — NUR
OT NOTE: PT COMPLETED BED MOB WITH SPV. PT COMPLETED EOB SITTING WITH SPV. PT
COMPLETED FACE WASHING WITH SETUP. PT COMPLETED BUE AROM EXS . PT REQUIRED
CUES FOR INCREASED PARTICIPATION.
[phone]
THANK YOU,MANISH SWIFT

## 2020-03-19 VITALS — DIASTOLIC BLOOD PRESSURE: 74 MMHG | SYSTOLIC BLOOD PRESSURE: 104 MMHG

## 2020-03-19 VITALS — SYSTOLIC BLOOD PRESSURE: 105 MMHG | DIASTOLIC BLOOD PRESSURE: 72 MMHG

## 2020-03-19 VITALS — DIASTOLIC BLOOD PRESSURE: 77 MMHG | SYSTOLIC BLOOD PRESSURE: 113 MMHG

## 2020-03-19 VITALS — DIASTOLIC BLOOD PRESSURE: 70 MMHG | SYSTOLIC BLOOD PRESSURE: 98 MMHG

## 2020-03-19 VITALS — DIASTOLIC BLOOD PRESSURE: 81 MMHG | SYSTOLIC BLOOD PRESSURE: 111 MMHG

## 2020-03-19 LAB
ALBUMIN SERPL-MCNC: 2.7 G/DL (ref 3.4–5)
ALP SERPL-CCNC: 74 U/L (ref 30–120)
ALT SERPL-CCNC: 40 U/L (ref 10–68)
ANION GAP SERPL CALC-SCNC: 12.8 MMOL/L (ref 8–16)
BASOPHILS NFR BLD AUTO: 0.7 % (ref 0–2)
BILIRUB SERPL-MCNC: 0.57 MG/DL (ref 0.2–1.3)
BUN SERPL-MCNC: 22 MG/DL (ref 7–18)
CALCIUM SERPL-MCNC: 8.1 MG/DL (ref 8.5–10.1)
CHLORIDE SERPL-SCNC: 102 MMOL/L (ref 98–107)
CO2 SERPL-SCNC: 24.8 MMOL/L (ref 21–32)
CREAT SERPL-MCNC: 1.2 MG/DL (ref 0.6–1.3)
EOSINOPHIL NFR BLD: 2.3 % (ref 0–7)
ERYTHROCYTE [DISTWIDTH] IN BLOOD BY AUTOMATED COUNT: 17.3 % (ref 11.5–14.5)
GLOBULIN SER-MCNC: 4 G/L
GLUCOSE SERPL-MCNC: 112 MG/DL (ref 74–106)
HCT VFR BLD CALC: 34.4 % (ref 42–54)
HGB BLD-MCNC: 11 G/DL (ref 13.5–17.5)
IMM GRANULOCYTES NFR BLD: 0.2 % (ref 0–5)
LYMPHOCYTES NFR BLD AUTO: 40.6 % (ref 15–50)
MAGNESIUM SERPL-MCNC: 1.8 MG/DL (ref 1.8–2.4)
MCH RBC QN AUTO: 22.6 PG (ref 26–34)
MCHC RBC AUTO-ENTMCNC: 32 G/DL (ref 31–37)
MCV RBC: 70.6 FL (ref 80–100)
MONOCYTES NFR BLD: 13.7 % (ref 2–11)
NEUTROPHILS NFR BLD AUTO: 42.5 % (ref 40–80)
OSMOLALITY SERPL CALC.SUM OF ELEC: 275 MOSM/KG (ref 275–300)
PLATELET # BLD: 233 10X3/UL (ref 130–400)
PMV BLD AUTO: 10.8 FL (ref 7.4–10.4)
POTASSIUM SERPL-SCNC: 3.6 MMOL/L (ref 3.5–5.1)
PROT SERPL-MCNC: 6.7 G/DL (ref 6.4–8.2)
RBC # BLD AUTO: 4.87 10X6/UL (ref 4.2–6.1)
SODIUM SERPL-SCNC: 136 MMOL/L (ref 136–145)
WBC # BLD AUTO: 4.3 10X3/UL (ref 4.8–10.8)

## 2020-03-19 NOTE — NUR
OT NOTE: PT COMPLETED BED MOB TASKS WITH SPV. PT COMPLETED FACE HYGIENE WITH
SPV. PT DOING WELL.
[phone]
THANK YOU,MANISH SWIFT

## 2020-03-19 NOTE — MORECARE
CASE MANAGEMENT DISCHARGE SUMMARY
 
 
PATIENT: RYANNE BOGGS                   UNIT: [id number]
ACCOUNT#: [id number]                       ADM DATE: 20
AGE: 44     : [birth year]  SEX: M            ROOM/BED: D.2104    
AUTHOR: CUONG,DOC                             PHYSICIAN:                               
 
REFERRING PHYSICIAN: LIZABETH RAMOS MD               
DATE OF SERVICE: 20
Discharge Plan
 
 
Patient Name: RYANNE BOGGS
Facility: [facility]:[place]
Encounter #: [id number]
Medical Record #: [id number]
: 1975
Planned Disposition: Home
Anticipated Discharge Date: 3/20/20
 
Discharge Date: 
Expected LOS: 4
Initial Reviewer: [id number]
Initial Review Date: 2020
Generated: 3/19/20   4:25 pm 
Comments
 
DCP- Discharge Planning
 
Updated by [email or web address]: Germain Schwartz on 3/19/20   2:24 pm CT
Patient Name: RYANNE BOGGS                                     
Admission Status: ER   
Accout number: [id number]                              
Admission Date: 2020   
: 1975                                                        
Admission Diagnosis:   
Attending: LIZABETH RAMOS                                                
Current LOS:  3   
  
Anticipated DC Date: 2020   
Planned Disposition: Home   
Primary Insurance: MEDICARE A & B   
  
  
Discharge Planning Comments:   
CM MET WITH PT IN ROOM TO DISCUSS DISCHARGE PLANNING AND NEEDS. PT REPORTS 
LIVING AT HOME INDEPENDENTLY AND ALONE. PT HAS NO MEDICAL EQUIPMENT AND NO 
OUTSIDE SERVICES ASSISTING IN THE HOME. CM DISCUSSED AVAILABILITY OF HOME 
HEALTH, REHAB SERVICES AND MEDICAL EQUIPMENT. PT DENIES DISCHARGE NEEDS AND 
 
ASKED IF HE WILL BE GETTING A NEBULIZER FOR HOME USE.  IF ORDERED, PT HAS NO 
PREFERENCE ON MEDICAL EQUIPMENT PROVIDER, REPORTS HIS BROTHER WILL PICK HIM 
UP FOR DISCHARGE HOME. IMPORTANT MESSAGE FROM MEDICARE PROVIDED AND EXPLAINED.
  CHOICE FOR NO MEDICAL EQUIPMENT PROVIDER PREFERENCE SIGNED.  
  
PT PLANS TO DISCHARGE HOME ALONE.  IF A NEBULIZER IS ORDERED FOR HOME USE, PT 
DOES NOT HAVE ONE AT HOME AND HAS NO MEDICAL EQUIPMENT PROVIDER PREFERENCE.  
  
: Germain Schwartz
 DCPIA - Discharge Planning Initial Assessment
 
Updated by [email or web address]: Germain Schwartz on 3/19/20   3:20 pm
*  Is the patient Alert and Oriented?
Yes
*  How many steps to enter\exit or inside your home? NONE *  PCP NONE *  Pharmacy
[facility]
*  Preadmission Environment
Home Alone
*  ADLs
Independent
*  Equipment
None
*  Other Equipment
NO MEDICAL EQUIPMENT PROVIDER PREFERENCE
*  List name and contact numbers for known caregivers / representatives who 
currently or will assist patient after discharge:
INNA WALTERER, [phone]
*  Verbal permission to speak to the caregivers and representatives has been 
obtained from the patient.
N/A
*  Community resources currently utilized
None
*  Please name any agencies selected above.
NONE
*  Additional services required to return to the preadmission environment?
No
*  Can the patient safely return to the preadmission environment?
Yes
*  Has this patient been hospitalized within the prior 30 days at any 
hospital?
No
 
 
 
 
 
 
 
Last DP export: 3/19/20   2:16 p
Patient Name: RYANNE BOGGS
 
Encounter #: [id number]
Page [id number]
 
 
 
 
 
Electronically Signed by BRYCE HUTCHINS on 20 at 1525
 
 
 
 
 
 
**All edits/amendments must be made on the electronic document**
 
DICTATION DATE: 20     : SARWAT  20     
RPT#: [id number]                                DC DATE:        
                                               STATUS: ADM IN  
[facility]
 [place] [address]
***END OF REPORT***

## 2020-03-19 NOTE — NUR
Nutrition Follow-up:
Eating well. Noted pt ~75% of breakfast this AM.
Diet: Cardiac Diabetic
PO intake: %
Wt: 174# (3/18); 176# (3/17)
Labs noted: Glu 112, Ca 8.1, Alb 2.7
Meds noted: Lasix, Protonix
-Continue current diet as tolerated.
-Monitor wt.
-RD following.

## 2020-03-19 NOTE — MORECARE
CASE MANAGEMENT DISCHARGE SUMMARY
 
 
PATIENT: RYANNE BOGGS                   UNIT: [id number]
ACCOUNT#: [id number]                       ADM DATE: 20
AGE: 44     : [birth year]  SEX: M            ROOM/BED: D.2104    
AUTHOR: BRYCE HUTCHINS                             PHYSICIAN:                               
 
REFERRING PHYSICIAN: LIZABETH RAMOS MD               
DATE OF SERVICE: 20
Discharge Plan
 
 
Patient Name: RYANNE BOGGS
Facility: [facility]:[place]
Encounter #: [id number]
Medical Record #: [id number]
: 1975
Planned Disposition: Home
Anticipated Discharge Date: 3/20/20
 
Discharge Date: 
Expected LOS: 4
Initial Reviewer: [email or web address]
Initial Review Date: 2020
Generated: 3/19/20   4:16 pm 
  
 
 
 
 
 
 
 
Patient Name: RYANNE BOGGS
 
Encounter #: [id number]
Page [id number]
 
 
 
 
 
Electronically Signed by BRYCE HUTCHINS on 20 at 1516
 
 
 
 
 
 
**All edits/amendments must be made on the electronic document**
 
DICTATION DATE: 20 151     : SARWAT  20     
RPT#: [id number]                                DC DATE:        
                                               STATUS: ADM IN  
[facility]
 [place] [address]
***END OF REPORT***

## 2020-03-20 VITALS — DIASTOLIC BLOOD PRESSURE: 70 MMHG | SYSTOLIC BLOOD PRESSURE: 103 MMHG

## 2020-03-20 VITALS — DIASTOLIC BLOOD PRESSURE: 58 MMHG | SYSTOLIC BLOOD PRESSURE: 93 MMHG

## 2020-03-20 VITALS — DIASTOLIC BLOOD PRESSURE: 78 MMHG | SYSTOLIC BLOOD PRESSURE: 109 MMHG

## 2020-03-20 LAB
ALBUMIN SERPL-MCNC: 2.9 G/DL (ref 3.4–5)
ALP SERPL-CCNC: 88 U/L (ref 30–120)
ALT SERPL-CCNC: 36 U/L (ref 10–68)
ANION GAP SERPL CALC-SCNC: 12.5 MMOL/L (ref 8–16)
BASOPHILS NFR BLD AUTO: 0.6 % (ref 0–2)
BILIRUB SERPL-MCNC: 0.51 MG/DL (ref 0.2–1.3)
BUN SERPL-MCNC: 24 MG/DL (ref 7–18)
CALCIUM SERPL-MCNC: 8.4 MG/DL (ref 8.5–10.1)
CHLORIDE SERPL-SCNC: 104 MMOL/L (ref 98–107)
CO2 SERPL-SCNC: 25.3 MMOL/L (ref 21–32)
CREAT SERPL-MCNC: 1.2 MG/DL (ref 0.6–1.3)
EOSINOPHIL NFR BLD: 3.5 % (ref 0–7)
ERYTHROCYTE [DISTWIDTH] IN BLOOD BY AUTOMATED COUNT: 17.5 % (ref 11.5–14.5)
GLOBULIN SER-MCNC: 4.3 G/L
GLUCOSE SERPL-MCNC: 126 MG/DL (ref 74–106)
HCT VFR BLD CALC: 38.6 % (ref 42–54)
HGB BLD-MCNC: 12.3 G/DL (ref 13.5–17.5)
IMM GRANULOCYTES NFR BLD: 0.2 % (ref 0–5)
LYMPHOCYTES NFR BLD AUTO: 41 % (ref 15–50)
MAGNESIUM SERPL-MCNC: 2.1 MG/DL (ref 1.8–2.4)
MCH RBC QN AUTO: 22.6 PG (ref 26–34)
MCHC RBC AUTO-ENTMCNC: 31.9 G/DL (ref 31–37)
MCV RBC: 71 FL (ref 80–100)
MONOCYTES NFR BLD: 14.5 % (ref 2–11)
NEUTROPHILS NFR BLD AUTO: 40.2 % (ref 40–80)
OSMOLALITY SERPL CALC.SUM OF ELEC: 281 MOSM/KG (ref 275–300)
PLATELET # BLD: 314 10X3/UL (ref 130–400)
PMV BLD AUTO: 10.5 FL (ref 7.4–10.4)
POTASSIUM SERPL-SCNC: 3.8 MMOL/L (ref 3.5–5.1)
PROT SERPL-MCNC: 7.2 G/DL (ref 6.4–8.2)
RBC # BLD AUTO: 5.44 10X6/UL (ref 4.2–6.1)
SODIUM SERPL-SCNC: 138 MMOL/L (ref 136–145)
WBC # BLD AUTO: 5.2 10X3/UL (ref 4.8–10.8)

## 2020-03-22 NOTE — MORECARE
CASE MANAGEMENT DISCHARGE SUMMARY
 
 
PATIENT: RYANNE BOGGS                   UNIT: [id number]
ACCOUNT#: [id number]                       ADM DATE: 20
AGE: 44     : 77  SEX: M            ROOM/BED: D.2109    
AUTHOR: CUONG,DOC                             PHYSICIAN:                               
 
REFERRING PHYSICIAN: LIZABETH RAMOS MD               
DATE OF SERVICE: 20
Discharge Plan
 
 
Patient Name: RYANNE BOGGS
Facility: [facility]:[place]
Encounter #: [id number]
Medical Record #: [id number]
: 1975
Planned Disposition: Home
Anticipated Discharge Date: 3/20/20
 
Discharge Date: 2020
Expected LOS: 4
Initial Reviewer: [email or web address]
Initial Review Date: 2020
Generated: 3/22/20   5:01 pm 
DCP- Discharge Planning
 
Updated by [id number]: Germain Schwartz on 3/19/20   2:24 pm CT
Patient Name: RYANNE BOGGS                                     
Admission Status: ER   
Accout number: [id number]                              
Admission Date: 2020   
: 1975                                                        
Admission Diagnosis:   
Attending: LIZABETH RAMOS                                                
Current LOS:  3   
  
Anticipated DC Date: 2020   
Planned Disposition: Home   
Primary Insurance: MEDICARE A & B   
  
  
Discharge Planning Comments:   
CM MET WITH PT IN ROOM TO DISCUSS DISCHARGE PLANNING AND NEEDS. PT REPORTS 
LIVING AT HOME INDEPENDENTLY AND ALONE. PT HAS NO MEDICAL EQUIPMENT AND NO 
OUTSIDE SERVICES ASSISTING IN THE HOME. CM DISCUSSED AVAILABILITY OF HOME 
HEALTH, REHAB SERVICES AND MEDICAL EQUIPMENT. PT DENIES DISCHARGE NEEDS AND 
ASKED IF HE WILL BE GETTING A NEBULIZER FOR HOME USE.  IF ORDERED, PT HAS NO 
PREFERENCE ON MEDICAL EQUIPMENT PROVIDER, REPORTS HIS BROTHER WILL PICK HIM 
 
UP FOR DISCHARGE HOME. IMPORTANT MESSAGE FROM MEDICARE PROVIDED AND EXPLAINED.
  CHOICE FOR NO MEDICAL EQUIPMENT PROVIDER PREFERENCE SIGNED.  
  
PT PLANS TO DISCHARGE HOME ALONE.  IF A NEBULIZER IS ORDERED FOR HOME USE, PT 
DOES NOT HAVE ONE AT HOME AND HAS NO MEDICAL EQUIPMENT PROVIDER PREFERENCE.  
  
: Germain Schwartz
 DCPIA - Discharge Planning Initial Assessment
 
Updated by [email or web address]: Germain Schwartz on 3/19/20   3:20 pm
*  Is the patient Alert and Oriented?
Yes
*  How many steps to enter\exit or inside your home? NONE *  PCP NONE *  Pharmacy
[facility]
*  Preadmission Environment
Home Alone
*  ADLs
Independent
*  Equipment
None
*  Other Equipment
NO MEDICAL EQUIPMENT PROVIDER PREFERENCE
*  List name and contact numbers for known caregivers / representatives who 
currently or will assist patient after discharge:
INNA WALTERER, [phone]
*  Verbal permission to speak to the caregivers and representatives has been 
obtained from the patient.
N/A
*  Community resources currently utilized
None
*  Please name any agencies selected above.
NONE
*  Additional services required to return to the preadmission environment?
No
*  Can the patient safely return to the preadmission environment?
Yes
*  Has this patient been hospitalized within the prior 30 days at any 
hospital?
No
 
 
 
 
 
Coverage Notice
 
Reviewer: [id number] Schwartz
 
Notice Issued Date-Time: 2020  14:24
Notice Type: IM Discharge Notice
 
Notice Delivered To: Patient
 
Relationship to Patient: 
Representative Name: 
 
Delivery Method: HAND - Hand Delivered
Roseann Days:
Prior Verbal Notification: 
 
Recipient Understood Notice: Yes
Recipient Signature: Yes
Med Rec Note Co-signed by Attending:
 
Coverage Notice Comment:  
Reviewer: AGUILAR Schwartz
 
Notice Issued Date-Time: 2020  14:25
Notice Type: IM Discharge Notice
 
Notice Delivered To: Patient
Relationship to Patient: 
Representative Name: 
 
Delivery Method: HAND - Hand Delivered
Roseann Days:
Prior Verbal Notification: 
 
Recipient Understood Notice: Yes
Recipient Signature: Yes
Med Rec Note Co-signed by Attending:
 
Coverage Notice Comment:  
 
Last DP export: 3/19/20   2:25 p
Patient Name: RYANNE BOGGS
Encounter #: [id number]
Page [id number]
 
 
 
 
 
Electronically Signed by BRYCE HUTCHINS on 20 at 1601
 
 
 
 
 
 
**All edits/amendments must be made on the electronic document**
 
DICTATION DATE: 20 1601     : SARWAT  20 1601     
RPT#: [id number]                                DC DATE:20
                                               STATUS: DIS IN  
[facility]
[address] [place] [address]
***END OF REPORT***

## 2020-04-11 ENCOUNTER — HOSPITAL ENCOUNTER (INPATIENT)
Dept: HOSPITAL 84 - D.ER | Age: 45
LOS: 7 days | Discharge: HOME | DRG: 193 | End: 2020-04-18
Attending: INTERNAL MEDICINE | Admitting: INTERNAL MEDICINE
Payer: MEDICARE

## 2020-04-11 VITALS — WEIGHT: 176 LBS | BODY MASS INDEX: 26.07 KG/M2 | HEIGHT: 69 IN

## 2020-04-11 DIAGNOSIS — E83.42: ICD-10-CM

## 2020-04-11 DIAGNOSIS — F17.203: ICD-10-CM

## 2020-04-11 DIAGNOSIS — I11.0: ICD-10-CM

## 2020-04-11 DIAGNOSIS — J10.00: Primary | ICD-10-CM

## 2020-04-11 DIAGNOSIS — I50.23: ICD-10-CM

## 2020-04-11 DIAGNOSIS — D50.9: ICD-10-CM

## 2020-04-11 DIAGNOSIS — Z91.14: ICD-10-CM

## 2020-04-11 DIAGNOSIS — N17.9: ICD-10-CM

## 2020-04-11 DIAGNOSIS — J02.0: ICD-10-CM

## 2020-04-11 LAB
ALBUMIN SERPL-MCNC: 2.7 G/DL (ref 3.4–5)
ALP SERPL-CCNC: 90 U/L (ref 30–120)
ALT SERPL-CCNC: 24 U/L (ref 10–68)
ANION GAP SERPL CALC-SCNC: 14.3 MMOL/L (ref 8–16)
BASOPHILS NFR BLD AUTO: 0.4 % (ref 0–2)
BILIRUB SERPL-MCNC: 1.09 MG/DL (ref 0.2–1.3)
BUN SERPL-MCNC: 8 MG/DL (ref 7–18)
CALCIUM SERPL-MCNC: 7.9 MG/DL (ref 8.5–10.1)
CHLORIDE SERPL-SCNC: 101 MMOL/L (ref 98–107)
CO2 SERPL-SCNC: 23.7 MMOL/L (ref 21–32)
CREAT SERPL-MCNC: 1.1 MG/DL (ref 0.6–1.3)
DIGOXIN SERPL-MCNC: 0.07 NG/ML (ref 0.9–2)
EOSINOPHIL NFR BLD: 0.9 % (ref 0–7)
ERYTHROCYTE [DISTWIDTH] IN BLOOD BY AUTOMATED COUNT: 19.8 % (ref 11.5–14.5)
GLOBULIN SER-MCNC: 4.2 G/L
GLUCOSE SERPL-MCNC: 137 MG/DL (ref 74–106)
HCT VFR BLD CALC: 35.8 % (ref 42–54)
HGB BLD-MCNC: 11.2 G/DL (ref 13.5–17.5)
IMM GRANULOCYTES NFR BLD: 0.2 % (ref 0–5)
LYMPHOCYTES NFR BLD AUTO: 33.2 % (ref 15–50)
MCH RBC QN AUTO: 21.9 PG (ref 26–34)
MCHC RBC AUTO-ENTMCNC: 31.3 G/DL (ref 31–37)
MCV RBC: 70.1 FL (ref 80–100)
MONOCYTES NFR BLD: 15.2 % (ref 2–11)
NEUTROPHILS NFR BLD AUTO: 50.1 % (ref 40–80)
NT-PROBNP SERPL-MCNC: 7337 PG/ML (ref 0–125)
OSMOLALITY SERPL CALC.SUM OF ELEC: 271 MOSM/KG (ref 275–300)
PLATELET # BLD: 342 10X3/UL (ref 130–400)
PMV BLD AUTO: 9.9 FL (ref 7.4–10.4)
POTASSIUM SERPL-SCNC: 3 MMOL/L (ref 3.5–5.1)
PROT SERPL-MCNC: 6.9 G/DL (ref 6.4–8.2)
RBC # BLD AUTO: 5.11 10X6/UL (ref 4.2–6.1)
SODIUM SERPL-SCNC: 136 MMOL/L (ref 136–145)
WBC # BLD AUTO: 5.5 10X3/UL (ref 4.8–10.8)

## 2020-04-12 VITALS — SYSTOLIC BLOOD PRESSURE: 118 MMHG | DIASTOLIC BLOOD PRESSURE: 83 MMHG

## 2020-04-12 VITALS — SYSTOLIC BLOOD PRESSURE: 111 MMHG | DIASTOLIC BLOOD PRESSURE: 88 MMHG

## 2020-04-12 VITALS — DIASTOLIC BLOOD PRESSURE: 79 MMHG | SYSTOLIC BLOOD PRESSURE: 109 MMHG

## 2020-04-12 VITALS — SYSTOLIC BLOOD PRESSURE: 108 MMHG | DIASTOLIC BLOOD PRESSURE: 67 MMHG

## 2020-04-12 NOTE — NUR
BEDSIDE REPORT RECEIVED, PT CARE ASSUMED. INTRODUCED SELF AND WROTE NAME ON
BOARD. PT SITTING UP IN BED, WATCHING TV, AAOX4. DENIES ANY NEEDS AT THIS
TIME. BED IN LOWEST POSITION, SR X2, CALL LIGHT WITHIN REACH. WILL CONTINUE TO
MONITOR.

## 2020-04-13 VITALS — DIASTOLIC BLOOD PRESSURE: 54 MMHG | SYSTOLIC BLOOD PRESSURE: 92 MMHG

## 2020-04-13 VITALS — SYSTOLIC BLOOD PRESSURE: 120 MMHG | DIASTOLIC BLOOD PRESSURE: 80 MMHG

## 2020-04-13 VITALS — SYSTOLIC BLOOD PRESSURE: 100 MMHG | DIASTOLIC BLOOD PRESSURE: 71 MMHG

## 2020-04-13 VITALS — DIASTOLIC BLOOD PRESSURE: 75 MMHG | SYSTOLIC BLOOD PRESSURE: 106 MMHG

## 2020-04-13 VITALS — SYSTOLIC BLOOD PRESSURE: 102 MMHG | DIASTOLIC BLOOD PRESSURE: 76 MMHG

## 2020-04-13 VITALS — SYSTOLIC BLOOD PRESSURE: 96 MMHG | DIASTOLIC BLOOD PRESSURE: 62 MMHG

## 2020-04-13 LAB
AMPHETAMINES UR QL SCN: NEGATIVE QUAL
ANION GAP SERPL CALC-SCNC: 12.9 MMOL/L (ref 8–16)
BARBITURATES UR QL SCN: NEGATIVE QUAL
BASOPHILS NFR BLD AUTO: 0.2 % (ref 0–2)
BENZODIAZ UR QL SCN: NEGATIVE QUAL
BILIRUB SERPL-MCNC: NEGATIVE MG/DL
BUN SERPL-MCNC: 15 MG/DL (ref 7–18)
BZE UR QL SCN: NEGATIVE QUAL
CALCIUM SERPL-MCNC: 8 MG/DL (ref 8.5–10.1)
CANNABINOIDS UR QL SCN: NEGATIVE QUAL
CHLORIDE SERPL-SCNC: 98 MMOL/L (ref 98–107)
CO2 SERPL-SCNC: 23.2 MMOL/L (ref 21–32)
CREAT SERPL-MCNC: 1.4 MG/DL (ref 0.6–1.3)
EOSINOPHIL NFR BLD: 0.4 % (ref 0–7)
ERYTHROCYTE [DISTWIDTH] IN BLOOD BY AUTOMATED COUNT: 19.3 % (ref 11.5–14.5)
GLUCOSE SERPL-MCNC: 96 MG/DL (ref 74–106)
GLUCOSE SERPL-MCNC: NEGATIVE MG/DL
HCT VFR BLD CALC: 36.3 % (ref 42–54)
HGB BLD-MCNC: 11 G/DL (ref 13.5–17.5)
IMM GRANULOCYTES NFR BLD: 0.5 % (ref 0–5)
KETONES UR STRIP-MCNC: NEGATIVE MG/DL
LYMPHOCYTES NFR BLD AUTO: 27.8 % (ref 15–50)
MCH RBC QN AUTO: 21.5 PG (ref 26–34)
MCHC RBC AUTO-ENTMCNC: 30.3 G/DL (ref 31–37)
MCV RBC: 70.9 FL (ref 80–100)
MONOCYTES NFR BLD: 14.5 % (ref 2–11)
NEUTROPHILS NFR BLD AUTO: 56.6 % (ref 40–80)
NITRITE UR-MCNC: NEGATIVE MG/ML
OPIATES UR QL SCN: POSITIVE QUAL
OSMOLALITY SERPL CALC.SUM OF ELEC: 261 MOSM/KG (ref 275–300)
PCP UR QL SCN: NEGATIVE QUAL
PH UR STRIP: 5 [PH] (ref 5–6)
PLATELET # BLD: 261 10X3/UL (ref 130–400)
PMV BLD AUTO: 9.5 FL (ref 7.4–10.4)
POTASSIUM SERPL-SCNC: 4.1 MMOL/L (ref 3.5–5.1)
RBC # BLD AUTO: 5.12 10X6/UL (ref 4.2–6.1)
SODIUM SERPL-SCNC: 130 MMOL/L (ref 136–145)
SP GR UR STRIP: 1.01 (ref 1–1.02)
UROBILINOGEN UR-MCNC: 1 MG/DL
WBC # BLD AUTO: 5.5 10X3/UL (ref 4.8–10.8)

## 2020-04-13 NOTE — NUR
PT REFUSING TELEMETRY, STATES "I DIDN'T THINK IT WAS WORKING." EXPLAINED TO PT
THAT HIS TELEMETRY WAS INDEED WORKING AND THAT HIS RHYTHM WAS SHOWING UP ON
THE TELEMTRY MONITORS. STATES "I DON'T WANT IT." REQUESTING TO BE CONNECTED TO
IV FLUIDS, EXPLAINED TO PT THAT HE DOESN'T HAVE ANY ORDERS FOR CONTINUOUS IV
FLUIDS, PT VERBALIZED UNDERSTANDING. DENIES ANY OTHER NEEDS AT THIS TIME. BED
IN LOWEST POSITION, SR X1, CALL LIGHT WITHIN REACH. WILL CONTINUE TO MONITOR.

## 2020-04-14 VITALS — SYSTOLIC BLOOD PRESSURE: 101 MMHG | DIASTOLIC BLOOD PRESSURE: 78 MMHG

## 2020-04-14 VITALS — SYSTOLIC BLOOD PRESSURE: 101 MMHG | DIASTOLIC BLOOD PRESSURE: 73 MMHG

## 2020-04-14 VITALS — DIASTOLIC BLOOD PRESSURE: 76 MMHG | SYSTOLIC BLOOD PRESSURE: 107 MMHG

## 2020-04-14 VITALS — SYSTOLIC BLOOD PRESSURE: 114 MMHG | DIASTOLIC BLOOD PRESSURE: 81 MMHG

## 2020-04-14 VITALS — DIASTOLIC BLOOD PRESSURE: 70 MMHG | SYSTOLIC BLOOD PRESSURE: 114 MMHG

## 2020-04-14 LAB
ANION GAP SERPL CALC-SCNC: 15.2 MMOL/L (ref 8–16)
BUN SERPL-MCNC: 15 MG/DL (ref 7–18)
CALCIUM SERPL-MCNC: 8.3 MG/DL (ref 8.5–10.1)
CHLORIDE SERPL-SCNC: 100 MMOL/L (ref 98–107)
CO2 SERPL-SCNC: 23.9 MMOL/L (ref 21–32)
CREAT SERPL-MCNC: 1.5 MG/DL (ref 0.6–1.3)
ERYTHROCYTE [DISTWIDTH] IN BLOOD BY AUTOMATED COUNT: 19.3 % (ref 11.5–14.5)
GLUCOSE SERPL-MCNC: 111 MG/DL (ref 74–106)
HCT VFR BLD CALC: 35 % (ref 42–54)
HGB BLD-MCNC: 10.8 G/DL (ref 13.5–17.5)
LYMPHOCYTES NFR BLD AUTO: 41 % (ref 15–50)
MAGNESIUM SERPL-MCNC: 1.7 MG/DL (ref 1.8–2.4)
MCH RBC QN AUTO: 21.6 PG (ref 26–34)
MCHC RBC AUTO-ENTMCNC: 30.9 G/DL (ref 31–37)
MCV RBC: 69.9 FL (ref 80–100)
MONOCYTES NFR BLD: 11 % (ref 2–11)
NEUTROPHILS NFR BLD AUTO: 48 % (ref 40–80)
OSMOLALITY SERPL CALC.SUM OF ELEC: 271 MOSM/KG (ref 275–300)
PLATELET # BLD EST: NORMAL 10*3/UL
PLATELET # BLD: 253 10X3/UL (ref 130–400)
PMV BLD AUTO: 10.2 FL (ref 7.4–10.4)
POTASSIUM SERPL-SCNC: 4.1 MMOL/L (ref 3.5–5.1)
RBC # BLD AUTO: 5.01 10X6/UL (ref 4.2–6.1)
SODIUM SERPL-SCNC: 135 MMOL/L (ref 136–145)
WBC # BLD AUTO: 5.3 10X3/UL (ref 4.8–10.8)

## 2020-04-14 NOTE — NUR
REPORT RECEIVED, WILL CONTINUE POC. PATIENT IS AAOX4, LYING IN SEMI-FOWLERS
POSITION. NO S/S OF DISTRESS OBSERVED, RR EVEN AND UNLABORED ON ROOM AIR.
PIV TO LT FA, SL. PATIENT DENIES NEEDS AT THIS TIME. CL IN REACH, BED LOCKED
AND LOWERED. DROPLET PRECAUTIONS MAINTAINED. WILL CTM.

## 2020-04-15 VITALS — DIASTOLIC BLOOD PRESSURE: 95 MMHG | SYSTOLIC BLOOD PRESSURE: 119 MMHG

## 2020-04-15 VITALS — DIASTOLIC BLOOD PRESSURE: 61 MMHG | SYSTOLIC BLOOD PRESSURE: 105 MMHG

## 2020-04-15 VITALS — SYSTOLIC BLOOD PRESSURE: 96 MMHG | DIASTOLIC BLOOD PRESSURE: 65 MMHG

## 2020-04-15 VITALS — DIASTOLIC BLOOD PRESSURE: 70 MMHG | SYSTOLIC BLOOD PRESSURE: 99 MMHG

## 2020-04-15 VITALS — DIASTOLIC BLOOD PRESSURE: 80 MMHG | SYSTOLIC BLOOD PRESSURE: 120 MMHG

## 2020-04-15 VITALS — SYSTOLIC BLOOD PRESSURE: 92 MMHG | DIASTOLIC BLOOD PRESSURE: 67 MMHG

## 2020-04-15 LAB
ANION GAP SERPL CALC-SCNC: 11.5 MMOL/L (ref 8–16)
BASOPHILS NFR BLD AUTO: 0.2 % (ref 0–2)
BUN SERPL-MCNC: 14 MG/DL (ref 7–18)
CALCIUM SERPL-MCNC: 8.1 MG/DL (ref 8.5–10.1)
CHLORIDE SERPL-SCNC: 99 MMOL/L (ref 98–107)
CO2 SERPL-SCNC: 24.4 MMOL/L (ref 21–32)
CREAT SERPL-MCNC: 1.2 MG/DL (ref 0.6–1.3)
CRP SERPL-MCNC: 5.8 MG/DL (ref 0–0.9)
EOSINOPHIL NFR BLD: 0.9 % (ref 0–7)
ERYTHROCYTE [DISTWIDTH] IN BLOOD BY AUTOMATED COUNT: 19.3 % (ref 11.5–14.5)
GLUCOSE SERPL-MCNC: 128 MG/DL (ref 74–106)
HCT VFR BLD CALC: 34.4 % (ref 42–54)
HGB BLD-MCNC: 10.6 G/DL (ref 13.5–17.5)
IMM GRANULOCYTES NFR BLD: 0.4 % (ref 0–5)
LYMPHOCYTES NFR BLD AUTO: 24.8 % (ref 15–50)
MAGNESIUM SERPL-MCNC: 1.6 MG/DL (ref 1.8–2.4)
MCH RBC QN AUTO: 21.5 PG (ref 26–34)
MCHC RBC AUTO-ENTMCNC: 30.8 G/DL (ref 31–37)
MCV RBC: 69.6 FL (ref 80–100)
MONOCYTES NFR BLD: 20 % (ref 2–11)
NEUTROPHILS NFR BLD AUTO: 53.7 % (ref 40–80)
OSMOLALITY SERPL CALC.SUM OF ELEC: 265 MOSM/KG (ref 275–300)
PLATELET # BLD: 260 10X3/UL (ref 130–400)
PMV BLD AUTO: 9.5 FL (ref 7.4–10.4)
POTASSIUM SERPL-SCNC: 3.9 MMOL/L (ref 3.5–5.1)
RBC # BLD AUTO: 4.94 10X6/UL (ref 4.2–6.1)
SODIUM SERPL-SCNC: 131 MMOL/L (ref 136–145)
WBC # BLD AUTO: 4.6 10X3/UL (ref 4.8–10.8)

## 2020-04-15 NOTE — NUR
PRN PAIN MEDICATION AND PRN COUGH MEDICATION GIVEN CALL LIGHT WITH IN REACH.
WILL CONTINUE TO MONITOR

## 2020-04-15 NOTE — EC
PATIENT:RYANNE BOGGS               DATE OF SERVICE: 04/11/20
SEX: M                                  MEDICAL RECORD: [id number]
DATE OF BIRTH: [birth year]                        LOCATION:D.M2      D.210
AGE OF PATIENT: 44                             ADMISSION DATE: 04/11/20
 
REFERRING PHYSICIAN:                               
 
INTERPRETING PHYSICIAN: SAURAV JAIMES MD          
 
 
 
                             ECHOCARDIOGRAM REPORT
  ECHO CHARGES 4               ECHO COMPLETE                 Date: 04/13/20
 
 
 
CLINICAL DIAGNOSIS: DYSPNEA                       
 
                         ECHOCARDIOGRAPHIC MEASUREMENTS
      (adult normal given)
   AC root (d.<3.7cm) 2.4  cm   LV Septum d (<1.2 cm> 1.5  cm
      Valve Excursion 1.0  cm     LV Septum (systole) 1.6  cm
Left Atria (s.<4.0cm> 4.9  cm          LVPW d(<1.2cm) 1.3  cm
        RV (d.<2.3cm) 5.0  cm           LVPW (sytole) 1.5  cm
  LV diastole(<5.6CM) 6.5  cm       MV E-F(>70mm/sec)      cm
           LV systole 5.1  cm           LVOT Diameter 2.0  cm
       MV exc.(>10mm) 1.0  cm
Est.ejection fraction (50-75%)     %
 
   DOPPLER:
     LVIT      cm/sec A 47.0 cm/sec E 96.0  cm/sec
       LA      cm/sec      RVSP 44   mmHg
     LVOT 87   cm/sec   AOP1/2T      m/s
  Asc. Ao 174  cm/sec
     RVOT 39   cm/sec
       RA      cm/sec
       PA 60   cm/sec
 AV Gradient Peak 12.13mmHg  AV Mean 7.29 mmHg  AV Area 1.5  cm
 MV Gradient Peak 5.85 mmHg  MV Mean 2.01 mmHg  MV Area      cm
   COMMENTS:                                              
 
 
 Cardiac Sonographer: 2               ANNA GOLD              
      Cardiologist: 3          Dr. Bennett             
             TAPE# PACS           
                                       Pericardial Effusion N                        
 
 
DATE OF SERVICE:  
 
Adequate 2D, color flow imaging, spectral Doppler, and M-Mode.
 
LVH is present.  LV internal dimensions is dilated.  LV is globally hypokinetic
with reduced EF, estimated EF 30% to 35%.  Aortic valve is tricuspid.  No
evidence of stenosis by Doppler interrogation.  Left atrium is dilated at 4.9
cm.  Mitral valve is thickened.  Moderate to severe MR.  Right-sided chambers
appeared dilated as well.  Moderate to severe TR.
 
 
 
 
ECHOCARDIOGRAM REPORT                          [id number]    RYANNE BOGGS       
 
 
TRANSINT:[id number] Voice Confirmation ID: [id number] DOCUMENT ID: [id number]
                                           
                                           SAURAV JAIMES MD          
 
 
 
Electronically Signed by SAURAV JAIMES on 04/15/20 at 0908
 
 
 
 
 
 
 
 
 
 
 
 
 
 
 
 
 
 
 
 
 
 
 
 
 
 
 
 
 
 
 
 
 
 
 
 
 
 
 
CC:                                                             [id number]
DICTATION DATE: 04/14/20 1239     :     04/14/20 1301      ADM IN  
                                                                              
[facility]                                          
[address] [place] [address]

## 2020-04-15 NOTE — NUR
PT HAS TRIED COMING OUT OF HIS ROOM SEVERAL TIMES DESPITE TEACHING PROVIDED
ABOUT STRICT ISOLATION RULES. PAGED ON CALL PHYSICIAN X2 NOW. PAGED HOUSE
SUPERVISOR. HOUSE AND UNIT MANAGE TALKED TO PT AND HE VERBALIZED
UNDERSTANDING. PT AGAIN CAME OUT REQUESTING TO GO TO ER ROOM TO HAVE SOMEONE
LOOK AT HIS RIBS. NO IMMEDIATE ABNORMALITIES NOTED BUT PT STATES IT HURTS
WHICH IS EXPECTED WITH HIS DIAGNOSIS AND RESPIRATORY ISSUES WITH COUGHING.
EXPLAINED TO PT WE ARE WAITING ON PHYSICIAN TO CALL BACK AND HE SAID OKAY BUT
THEN WAS CAUGHT AMBULATING TO ER WHEN HIS NURSE WAS BUSY WITH ANOTHER PT. PT
WILL NOT WEAR A MASK AND IS FULLY CLOTHED AND STATES "IM LEAVING" HOWEVER PT
REFUSED TO SIGN AMA FORM. PT WAS ASKED BY ER MANAGER ESTUARDO TO STAY IN ROOM AND
THAT ER CAN NOT SEE HIM IF HES ALREADY ADMITTED INPATIENT. PAGED ON CALL
PHYSICIAN ONCE AGAIN AND WILL HAVE TO CALL SECURITY IF PT WILL NOT COOPERATE.
PT IS COUGHING AND DOESNT COVER HIS MOUTH AND STATES HE DOESNT CARE BECAUSE HE
WANTS HIS RIBS "LOOKED AT" NURSE ASSESSED PT AND HE STILL IS BEING COMPLICATED
EVEN AFTER COUGH MEDICATION PROVIDED FOR RELIEF. WILL CTM AND TRY TO
DEESCALATE THE SITUATION.

## 2020-04-15 NOTE — NUR
PT LYING IN BED AWAKE ALERT AND ORIENTED x4. PT C/O PRESISTANT COUGH AND
PAIN TO LEFT BACK AREA PRN COUGH MEDICATION ORDERED. PT ENCOUARGED TO CALL FOR
HELP WHEN NEEDED. CALL LIGHT WITH IN REACH. WILL CONTINUE TO MONITOR

## 2020-04-15 NOTE — NUR
Nutrition Follow-up:
Overall eating well.
Diet: Cardiac
PO intake: 81% avg x last 4 meals
No new wt; last wt: 176# (4/13)
Labs noted: Na 131, Glu 128, Ca 8.1, Mg 1.6
Meds noted: MagOx, Protonix, Lasix
-Encourage PO intake and honor food preferences within diet restrictions.
-Offer nutritional supplements.
-Monitor wt.
-RD following.

## 2020-04-16 VITALS — SYSTOLIC BLOOD PRESSURE: 108 MMHG | DIASTOLIC BLOOD PRESSURE: 79 MMHG

## 2020-04-16 VITALS — DIASTOLIC BLOOD PRESSURE: 77 MMHG | SYSTOLIC BLOOD PRESSURE: 99 MMHG

## 2020-04-16 VITALS — SYSTOLIC BLOOD PRESSURE: 100 MMHG | DIASTOLIC BLOOD PRESSURE: 71 MMHG

## 2020-04-16 LAB
ANION GAP SERPL CALC-SCNC: 11.2 MMOL/L (ref 8–16)
BASOPHILS NFR BLD AUTO: 0.2 % (ref 0–2)
BUN SERPL-MCNC: 16 MG/DL (ref 7–18)
CALCIUM SERPL-MCNC: 8.3 MG/DL (ref 8.5–10.1)
CHLORIDE SERPL-SCNC: 98 MMOL/L (ref 98–107)
CO2 SERPL-SCNC: 26.9 MMOL/L (ref 21–32)
CREAT SERPL-MCNC: 1.5 MG/DL (ref 0.6–1.3)
CRP SERPL-MCNC: 5.8 MG/DL (ref 0–0.9)
EOSINOPHIL NFR BLD: 0.4 % (ref 0–7)
ERYTHROCYTE [DISTWIDTH] IN BLOOD BY AUTOMATED COUNT: 19.4 % (ref 11.5–14.5)
GLUCOSE SERPL-MCNC: 90 MG/DL (ref 74–106)
HCT VFR BLD CALC: 35.2 % (ref 42–54)
HGB BLD-MCNC: 10.9 G/DL (ref 13.5–17.5)
IMM GRANULOCYTES NFR BLD: 0.6 % (ref 0–5)
LYMPHOCYTES NFR BLD AUTO: 24.7 % (ref 15–50)
MAGNESIUM SERPL-MCNC: 1.7 MG/DL (ref 1.8–2.4)
MCH RBC QN AUTO: 21.5 PG (ref 26–34)
MCHC RBC AUTO-ENTMCNC: 31 G/DL (ref 31–37)
MCV RBC: 69.6 FL (ref 80–100)
MONOCYTES NFR BLD: 17.2 % (ref 2–11)
NEUTROPHILS NFR BLD AUTO: 56.9 % (ref 40–80)
OSMOLALITY SERPL CALC.SUM OF ELEC: 265 MOSM/KG (ref 275–300)
PLATELET # BLD: 238 10X3/UL (ref 130–400)
PMV BLD AUTO: 9.8 FL (ref 7.4–10.4)
POTASSIUM SERPL-SCNC: 4.1 MMOL/L (ref 3.5–5.1)
RBC # BLD AUTO: 5.06 10X6/UL (ref 4.2–6.1)
SODIUM SERPL-SCNC: 132 MMOL/L (ref 136–145)
WBC # BLD AUTO: 5.2 10X3/UL (ref 4.8–10.8)

## 2020-04-16 NOTE — NUR
PT LYING IN BED AWAKE ALERT AND ORIENTD x4. NO SIGNS OF DISTRESS NOTED.
RESPIRATIONS EVEN AND UNLABORED. CALL LIGHT WITH IN REACH. NO COMPLAINTS
AT THIS TIME WILL CONTINUE TO MONITOR.

## 2020-04-16 NOTE — NUR
PT UP TO RESTROOM. C/O PAIN TO BACK. PRN PAIN MEDICATION GIVEN.  NO OTHER
COMPLAINTS AT THIS TIME. CALL LIGHT WITH IN REACH. WILL CONTINUE TO MONITOR.

## 2020-04-17 VITALS — DIASTOLIC BLOOD PRESSURE: 69 MMHG | SYSTOLIC BLOOD PRESSURE: 104 MMHG

## 2020-04-17 VITALS — SYSTOLIC BLOOD PRESSURE: 128 MMHG | DIASTOLIC BLOOD PRESSURE: 81 MMHG

## 2020-04-17 VITALS — SYSTOLIC BLOOD PRESSURE: 102 MMHG | DIASTOLIC BLOOD PRESSURE: 72 MMHG

## 2020-04-17 LAB
ANION GAP SERPL CALC-SCNC: 16 MMOL/L (ref 8–16)
BASOPHILS NFR BLD AUTO: 0.4 % (ref 0–2)
BUN SERPL-MCNC: 18 MG/DL (ref 7–18)
CALCIUM SERPL-MCNC: 7.9 MG/DL (ref 8.5–10.1)
CHLORIDE SERPL-SCNC: 99 MMOL/L (ref 98–107)
CO2 SERPL-SCNC: 24 MMOL/L (ref 21–32)
CREAT SERPL-MCNC: 1.2 MG/DL (ref 0.6–1.3)
EOSINOPHIL NFR BLD: 0.6 % (ref 0–7)
ERYTHROCYTE [DISTWIDTH] IN BLOOD BY AUTOMATED COUNT: 19.4 % (ref 11.5–14.5)
GLUCOSE SERPL-MCNC: 100 MG/DL (ref 74–106)
HCT VFR BLD CALC: 35.3 % (ref 42–54)
HGB BLD-MCNC: 10.8 G/DL (ref 13.5–17.5)
IMM GRANULOCYTES NFR BLD: 0.2 % (ref 0–5)
LYMPHOCYTES NFR BLD AUTO: 35.1 % (ref 15–50)
MAGNESIUM SERPL-MCNC: 1.9 MG/DL (ref 1.8–2.4)
MCH RBC QN AUTO: 21.2 PG (ref 26–34)
MCHC RBC AUTO-ENTMCNC: 30.6 G/DL (ref 31–37)
MCV RBC: 69.2 FL (ref 80–100)
MONOCYTES NFR BLD: 18.9 % (ref 2–11)
NEUTROPHILS NFR BLD AUTO: 44.8 % (ref 40–80)
OSMOLALITY SERPL CALC.SUM OF ELEC: 269 MOSM/KG (ref 275–300)
PLATELET # BLD: 262 10X3/UL (ref 130–400)
PMV BLD AUTO: 9.7 FL (ref 7.4–10.4)
POTASSIUM SERPL-SCNC: 5 MMOL/L (ref 3.5–5.1)
RBC # BLD AUTO: 5.1 10X6/UL (ref 4.2–6.1)
SODIUM SERPL-SCNC: 134 MMOL/L (ref 136–145)
WBC # BLD AUTO: 5.4 10X3/UL (ref 4.8–10.8)

## 2020-04-17 NOTE — MORECARE
CASE MANAGEMENT DISCHARGE SUMMARY
 
 
PATIENT: RYANNE BOGGS                   UNIT: [id number]
ACCOUNT#: [id number]                       ADM DATE: 20
AGE: 44     : 77  SEX: M            ROOM/BED: D.2133    
AUTHOR: BRYCE HUTCHINS                             PHYSICIAN:                               
 
REFERRING PHYSICIAN: JAMIE POLO MD               
DATE OF SERVICE: 20
Discharge Plan
 
 
Patient Name: RYANNE BOGGS
Facility: [facility]:[place]
Encounter #: [id number]
Medical Record #: [id number]
: 1975
Planned Disposition: Home
Anticipated Discharge Date: 
 
Discharge Date: 
Expected LOS: 
Initial Reviewer: [email or web address]
Initial Review Date: 2020
Generated: 20   5:46 pm 
  
 
 
 
 
 
 
 
Patient Name: RYANNE BOGGS
 
Encounter #: [id number]
Page [id number]
 
 
 
 
 
Electronically Signed by BRYCE HUTCHINS on 20 at 1646
 
 
 
 
 
 
**All edits/amendments must be made on the electronic document**
 
DICTATION DATE: 20     : SARWAT  20     
RPT#: [id number]                                DC DATE:        
                                               STATUS: ADM IN  
[facility]
[address] [place] [address]
***END OF REPORT***

## 2020-04-17 NOTE — NUR
RECEIVED UP IN BED WITH EYES OPEN AND TV ON. ALERT ANAD ORIETNED X4. UP AD
PORFIRIO. NOT WEARING O2 AT THIS TIME. SATS 97-98%. IV TO LT WRIST SL. DENIES ANY
NEEDS. REMAINS IN ISOLATION.

## 2020-04-17 NOTE — MORECARE
CASE MANAGEMENT DISCHARGE SUMMARY
 
 
PATIENT: RYANNE BOGGS                   UNIT: [id number]
ACCOUNT#: [id number]                       ADM DATE: 20
AGE: 44     : 77  SEX: M            ROOM/BED: D.2133    
AUTHOR: CUONG,DOC                             PHYSICIAN:                               
 
REFERRING PHYSICIAN: JAMIE POLO MD               
DATE OF SERVICE: 20
Discharge Plan
 
 
Patient Name: RYANNE BOGGS
Facility: [facility]:[place]
Encounter #: [id number]
Medical Record #: [id number]
: 1975
Planned Disposition: Home
Anticipated Discharge Date: 
 
Discharge Date: 
Expected LOS: 
Initial Reviewer: [email or web address]
Initial Review Date: 2020
Generated: 20   5:52 pm 
Comments
 
DCP- Discharge Planning
 
Updated by [id number]: Carmen Snider on 20   3:49 pm CT
Patient Name: RYANNE BOGGS                                     
Admission Status: ER   
Accout number: [id number]                              
Admission Date: 2020   
: 1975                                                        
Admission Diagnosis:SHORTNESS OF BREATH   
Attending: JAMIE POLO                                                
Current LOS:  6   
  
Anticipated DC Date:    
Planned Disposition: Home   
Primary Insurance: MEDICARE A & B   
  
Discharge Plan - home alone.   
  
  
Discharge Planning Comments: CM met with patient to complete initial dc 
planning assessment.  CM educated patient on the CM role and verbal consent 
given by patient to complete assessment.   Patient lives at home alone.  At 
 
discharge patient plans to return  and feels this is a safe discharge.  CM 
discussed availability of home health, rehab services, and medical equipment. 
Patient denied known discharge needs at this time. CM will continue to follow 
and will assist as needed with dc plans/needs.    
  
  
  
  
  
  
  
  
: Carmen Snider
 DCPIA - Discharge Planning Initial Assessment
 
Updated by [id number]: Carmen Snider on 20   4:46 pm
*  Is the patient Alert and Oriented?
Yes
*  How many steps to enter\exit or inside your home? 0/0 *  PCP None *  Pharmacy [facility]
on [address]
*  Preadmission Environment
Home Alone
*  ADLs
Independent
*  Equipment
None
*  List name and contact numbers for known caregivers / representatives who 
currently or will assist patient after discharge:
Sky Lynn [facility]- [phone]
*  Verbal permission to speak to the caregivers and representatives has been 
obtained from the patient.
Yes
*  Community resources currently utilized
None
*  Additional services required to return to the preadmission environment?
No
*  Can the patient safely return to the preadmission environment?
Yes
*  Has this patient been hospitalized within the prior 30 days at any 
hospital?
Yes
 
 
 
 
 
 
 
Last DP export: 20   3:46 p
Patient Name: RYANNE BOGGS
 
Encounter #: [id number]
Page [phone]
 
 
 
 
 
Electronically Signed by BRYCE HUTCHINS on 20 at 1653
 
 
 
 
 
 
**All edits/amendments must be made on the electronic document**
 
DICTATION DATE: 20     : SARWAT  20     
RPT#: [id number]                                DC DATE:        
                                               STATUS: ADM IN  
[facility]
[address] [place] [address]
***END OF REPORT***

## 2020-04-18 VITALS — SYSTOLIC BLOOD PRESSURE: 112 MMHG | DIASTOLIC BLOOD PRESSURE: 70 MMHG

## 2020-04-18 VITALS — SYSTOLIC BLOOD PRESSURE: 102 MMHG | DIASTOLIC BLOOD PRESSURE: 70 MMHG

## 2020-04-18 LAB
ANION GAP SERPL CALC-SCNC: 12.8 MMOL/L (ref 8–16)
BASOPHILS NFR BLD AUTO: 0.4 % (ref 0–2)
BUN SERPL-MCNC: 14 MG/DL (ref 7–18)
CALCIUM SERPL-MCNC: 8.1 MG/DL (ref 8.5–10.1)
CHLORIDE SERPL-SCNC: 101 MMOL/L (ref 98–107)
CO2 SERPL-SCNC: 26 MMOL/L (ref 21–32)
CREAT SERPL-MCNC: 1.2 MG/DL (ref 0.6–1.3)
EOSINOPHIL NFR BLD: 0.8 % (ref 0–7)
ERYTHROCYTE [DISTWIDTH] IN BLOOD BY AUTOMATED COUNT: 19.6 % (ref 11.5–14.5)
GLUCOSE SERPL-MCNC: 166 MG/DL (ref 74–106)
HCT VFR BLD CALC: 34.6 % (ref 42–54)
HGB BLD-MCNC: 10.7 G/DL (ref 13.5–17.5)
IMM GRANULOCYTES NFR BLD: 0.4 % (ref 0–5)
LYMPHOCYTES NFR BLD AUTO: 33.8 % (ref 15–50)
MAGNESIUM SERPL-MCNC: 2 MG/DL (ref 1.8–2.4)
MCH RBC QN AUTO: 21.2 PG (ref 26–34)
MCHC RBC AUTO-ENTMCNC: 30.9 G/DL (ref 31–37)
MCV RBC: 68.7 FL (ref 80–100)
MONOCYTES NFR BLD: 20.5 % (ref 2–11)
NEUTROPHILS NFR BLD AUTO: 44.1 % (ref 40–80)
OSMOLALITY SERPL CALC.SUM OF ELEC: 276 MOSM/KG (ref 275–300)
PLATELET # BLD: 256 10X3/UL (ref 130–400)
PMV BLD AUTO: 9.7 FL (ref 7.4–10.4)
POTASSIUM SERPL-SCNC: 3.8 MMOL/L (ref 3.5–5.1)
RBC # BLD AUTO: 5.04 10X6/UL (ref 4.2–6.1)
SODIUM SERPL-SCNC: 136 MMOL/L (ref 136–145)
WBC # BLD AUTO: 4.9 10X3/UL (ref 4.8–10.8)

## 2020-04-18 NOTE — MORECARE
CASE MANAGEMENT DISCHARGE SUMMARY
 
 
PATIENT: RYANNE KUHN                   UNIT: [id number]
ACCOUNT#: [id number]                       ADM DATE: 20
AGE: 44     : 77  SEX: M            ROOM/BED: D.2133    
AUTHOR: CUONG,DOC                             PHYSICIAN:                               
 
REFERRING PHYSICIAN: JAMIE POLO MD               
DATE OF SERVICE: 20
Discharge Plan
 
 
Patient Name: RYANNE KUHN
Facility: [facility]:[place]
Encounter #: [id number]
Medical Record #: [id number]
: 1975
Planned Disposition: Home
Anticipated Discharge Date: 20
 
Discharge Date: 
Expected LOS: 7
Initial Reviewer: [email or web address]
Initial Review Date: 2020
Generated: 20   3:11 pm 
Comments
 
DCP- Discharge Planning
 
Updated by [id number]: Ginette Springer on 20   1:08 pm CT
Patient's transportation is on the way. She denies any needs.   
DC IMM signed.
DCP- Discharge Planning
 
Updated by [id number]: Carmen Snider on 20   3:49 pm CT
Patient Name: RYANNE KUHN                                     
Admission Status: ER   
Accout number: [id number]                              
Admission Date: 2020   
: 1975                                                        
Admission Diagnosis:SHORTNESS OF BREATH   
Attending: JAMIE POLO                                                
Current LOS:  6   
  
Anticipated DC Date:    
Planned Disposition: Home   
Primary Insurance: MEDICARE A & B   
  
Discharge Plan - home alone.   
 
  
  
Discharge Planning Comments: CM met with patient to complete initial dc 
planning assessment.  CM educated patient on the CM role and verbal consent 
given by patient to complete assessment.   Patient lives at home alone.  At 
discharge patient plans to return  and feels this is a safe discharge.  CM 
discussed availability of home health, rehab services, and medical equipment. 
Patient denied known discharge needs at this time. CM will continue to follow 
and will assist as needed with dc plans/needs.    
  
  
  
  
  
  
  
  
: Carmen Snider
 DCPIA - Discharge Planning Initial Assessment
 
Updated by [email or web address]: Carmen Snider on 20   4:46 pm
*  Is the patient Alert and Oriented?
Yes
*  How many steps to enter\exit or inside your home? 0/0 *  PCP None *  Pharmacy [facility]
on [address] [facility]
*  Preadmission Environment
Home Alone
*  ADLs
Independent
*  Equipment
None
*  List name and contact numbers for known caregivers / representatives who 
currently or will assist patient after discharge:
Sky santiago- [phone]
*  Verbal permission to speak to the caregivers and representatives has been 
obtained from the patient.
Yes
*  Community resources currently utilized
None
*  Additional services required to return to the preadmission environment?
No
*  Can the patient safely return to the preadmission environment?
Yes
*  Has this patient been hospitalized within the prior 30 days at any 
hospital?
Yes
 
 
 
 
 
Coverage Notice
 
 
Reviewer: [email or web address] Leigh Ann Springer
 
Notice Issued Date-Time: 2020  14:06
Notice Type: IM Discharge Notice
 
Notice Delivered To: Patient
Relationship to Patient: Self
Representative Name: Ryanne Kuhn
 
Delivery Method: HAND - Hand Delivered
Roseann Days:
Prior Verbal Notification: 
 
Recipient Understood Notice: Yes
Recipient Signature: Yes
Med Rec Note Co-signed by Attending:
 
Coverage Notice Comment:  DC IMM signed by patient, placed on chart.  Patient 
declined her copy.
 
Last DP export: 20   3:53 p
Patient Name: RYANNE KUHN
Encounter #: [id number]
Page [phone]
 
 
 
 
 
Electronically Signed by BRYCE HUTCHINS on 20 at 1412
 
 
 
 
 
 
**All edits/amendments must be made on the electronic document**
 
DICTATION DATE: 20 1412     : SARWAT  20 141     
RPT#: [id number]                                DC DATE:        
                                               STATUS: ADM IN  
[facility]
 [place] [address]
***END OF REPORT***

## 2020-04-18 NOTE — NUR
LEFT FACILITY AT 1915 IN [place] TO HOME. ALERT AND ORIENTED X4. PLEASANT AND
TALKATIVE. IV D/C'D AND TOOK ALL PERSONAL BELONGINGS.

## 2020-04-18 NOTE — NUR
PT LAYING SUPINE. RR EVEN AND UNLABORED ON RA. SAW PT PULL OUT IV AND STATED
IT WAS ALREADY OUT. CATHETER TIP INTACT. DROPLET PRECAUTIONS IN PLACE.
ASSESSMENT COMPLETE. CALL LIGHT WITHIN REACH. BED IN LOWEST POSITION. WILL
COTNINUE TO MONITOR.

## 2020-04-22 ENCOUNTER — HOSPITAL ENCOUNTER (EMERGENCY)
Dept: HOSPITAL 84 - D.ER | Age: 45
Discharge: HOME | End: 2020-04-22
Payer: MEDICARE

## 2020-04-22 VITALS — DIASTOLIC BLOOD PRESSURE: 93 MMHG | SYSTOLIC BLOOD PRESSURE: 123 MMHG

## 2020-04-22 VITALS — HEIGHT: 69 IN | WEIGHT: 175.37 LBS | BODY MASS INDEX: 25.98 KG/M2

## 2020-04-22 DIAGNOSIS — R60.0: ICD-10-CM

## 2020-04-22 DIAGNOSIS — E11.9: ICD-10-CM

## 2020-04-22 DIAGNOSIS — I50.9: ICD-10-CM

## 2020-04-22 DIAGNOSIS — I11.0: Primary | ICD-10-CM

## 2020-04-22 DIAGNOSIS — E87.6: ICD-10-CM

## 2020-04-22 DIAGNOSIS — Z72.0: ICD-10-CM

## 2020-04-22 LAB
ALBUMIN SERPL-MCNC: 2.8 G/DL (ref 3.4–5)
ALP SERPL-CCNC: 100 U/L (ref 30–120)
ALT SERPL-CCNC: 38 U/L (ref 10–68)
ANION GAP SERPL CALC-SCNC: 11.1 MMOL/L (ref 8–16)
APTT BLD: 33.9 SECONDS (ref 22.8–39.4)
BASOPHILS NFR BLD AUTO: 0.6 % (ref 0–2)
BILIRUB SERPL-MCNC: 0.9 MG/DL (ref 0.2–1.3)
BUN SERPL-MCNC: 9 MG/DL (ref 7–18)
CALCIUM SERPL-MCNC: 8 MG/DL (ref 8.5–10.1)
CHLORIDE SERPL-SCNC: 104 MMOL/L (ref 98–107)
CK MB SERPL-MCNC: 1 U/L (ref 0–3.6)
CK SERPL-CCNC: 161 UL (ref 21–232)
CO2 SERPL-SCNC: 30.3 MMOL/L (ref 21–32)
CREAT SERPL-MCNC: 1.1 MG/DL (ref 0.6–1.3)
EOSINOPHIL NFR BLD: 0.9 % (ref 0–7)
ERYTHROCYTE [DISTWIDTH] IN BLOOD BY AUTOMATED COUNT: 20.4 % (ref 11.5–14.5)
GLOBULIN SER-MCNC: 4.3 G/L
GLUCOSE SERPL-MCNC: 109 MG/DL (ref 74–106)
HCT VFR BLD CALC: 37.6 % (ref 42–54)
HGB BLD-MCNC: 11.6 G/DL (ref 13.5–17.5)
IMM GRANULOCYTES NFR BLD: 0.2 % (ref 0–5)
INR PPP: 1.47 (ref 0.85–1.17)
LYMPHOCYTES NFR BLD AUTO: 31.2 % (ref 15–50)
MAGNESIUM SERPL-MCNC: 2 MG/DL (ref 1.8–2.4)
MCH RBC QN AUTO: 21.2 PG (ref 26–34)
MCHC RBC AUTO-ENTMCNC: 30.9 G/DL (ref 31–37)
MCV RBC: 68.9 FL (ref 80–100)
MONOCYTES NFR BLD: 19.9 % (ref 2–11)
NEUTROPHILS NFR BLD AUTO: 47.2 % (ref 40–80)
OSMOLALITY SERPL CALC.SUM OF ELEC: 282 MOSM/KG (ref 275–300)
PLATELET # BLD: 223 10X3/UL (ref 130–400)
POTASSIUM SERPL-SCNC: 3.4 MMOL/L (ref 3.5–5.1)
PROT SERPL-MCNC: 7.1 G/DL (ref 6.4–8.2)
PROTHROMBIN TIME: 17.7 SECONDS (ref 11.6–15)
RBC # BLD AUTO: 5.46 10X6/UL (ref 4.2–6.1)
SODIUM SERPL-SCNC: 142 MMOL/L (ref 136–145)
TROPONIN I SERPL-MCNC: 0.02 NG/ML (ref 0–0.06)
WBC # BLD AUTO: 5.3 10X3/UL (ref 4.8–10.8)

## 2020-04-23 ENCOUNTER — HOSPITAL ENCOUNTER (INPATIENT)
Dept: HOSPITAL 84 - D.ER | Age: 45
LOS: 5 days | Discharge: LEFT BEFORE DISCHARGE | DRG: 292 | End: 2020-04-28
Attending: INTERNAL MEDICINE | Admitting: INTERNAL MEDICINE
Payer: MEDICARE

## 2020-04-23 VITALS
HEIGHT: 69 IN | HEIGHT: 69 IN | WEIGHT: 150.32 LBS | BODY MASS INDEX: 22.26 KG/M2 | BODY MASS INDEX: 22.26 KG/M2 | WEIGHT: 150.32 LBS | BODY MASS INDEX: 22.26 KG/M2

## 2020-04-23 DIAGNOSIS — Z91.14: ICD-10-CM

## 2020-04-23 DIAGNOSIS — F17.213: ICD-10-CM

## 2020-04-23 DIAGNOSIS — F12.10: ICD-10-CM

## 2020-04-23 DIAGNOSIS — E11.65: ICD-10-CM

## 2020-04-23 DIAGNOSIS — F15.10: ICD-10-CM

## 2020-04-23 DIAGNOSIS — I50.33: ICD-10-CM

## 2020-04-23 DIAGNOSIS — D50.9: ICD-10-CM

## 2020-04-23 DIAGNOSIS — I11.0: Primary | ICD-10-CM

## 2020-04-23 LAB
ALBUMIN SERPL-MCNC: 2.7 G/DL (ref 3.4–5)
ALP SERPL-CCNC: 95 U/L (ref 30–120)
ALT SERPL-CCNC: 32 U/L (ref 10–68)
AMPHETAMINES UR QL SCN: POSITIVE QUAL
ANION GAP SERPL CALC-SCNC: 12.9 MMOL/L (ref 8–16)
APTT BLD: 33.6 SECONDS (ref 22.8–39.4)
BARBITURATES UR QL SCN: NEGATIVE QUAL
BASOPHILS NFR BLD AUTO: 0.5 % (ref 0–2)
BENZODIAZ UR QL SCN: NEGATIVE QUAL
BILIRUB SERPL-MCNC: 1.55 MG/DL (ref 0.2–1.3)
BILIRUB SERPL-MCNC: NEGATIVE MG/DL
BUN SERPL-MCNC: 10 MG/DL (ref 7–18)
BZE UR QL SCN: NEGATIVE QUAL
CALCIUM SERPL-MCNC: 8.4 MG/DL (ref 8.5–10.1)
CANNABINOIDS UR QL SCN: POSITIVE QUAL
CHLORIDE SERPL-SCNC: 101 MMOL/L (ref 98–107)
CK MB SERPL-MCNC: 1 U/L (ref 0–3.6)
CK SERPL-CCNC: 129 UL (ref 21–232)
CO2 SERPL-SCNC: 25.9 MMOL/L (ref 21–32)
CREAT SERPL-MCNC: 1.2 MG/DL (ref 0.6–1.3)
EOSINOPHIL NFR BLD: 0.5 % (ref 0–7)
ERYTHROCYTE [DISTWIDTH] IN BLOOD BY AUTOMATED COUNT: 20.6 % (ref 11.5–14.5)
GLOBULIN SER-MCNC: 4.5 G/L
GLUCOSE SERPL-MCNC: 133 MG/DL (ref 74–106)
GLUCOSE SERPL-MCNC: NEGATIVE MG/DL
HCT VFR BLD CALC: 37.7 % (ref 42–54)
HGB BLD-MCNC: 11.7 G/DL (ref 13.5–17.5)
IMM GRANULOCYTES NFR BLD: 0.3 % (ref 0–5)
INR PPP: 1.55 (ref 0.85–1.17)
KETONES UR STRIP-MCNC: NEGATIVE MG/DL
LYMPHOCYTES NFR BLD AUTO: 29.2 % (ref 15–50)
MCH RBC QN AUTO: 21.4 PG (ref 26–34)
MCHC RBC AUTO-ENTMCNC: 31 G/DL (ref 31–37)
MCV RBC: 68.9 FL (ref 80–100)
MONOCYTES NFR BLD: 12.7 % (ref 2–11)
NEUTROPHILS NFR BLD AUTO: 56.8 % (ref 40–80)
NITRITE UR-MCNC: NEGATIVE MG/ML
NT-PROBNP SERPL-MCNC: 8312 PG/ML (ref 0–125)
OPIATES UR QL SCN: POSITIVE QUAL
OSMOLALITY SERPL CALC.SUM OF ELEC: 272 MOSM/KG (ref 275–300)
PCP UR QL SCN: NEGATIVE QUAL
PH UR STRIP: 5 [PH] (ref 5–6)
PLATELET # BLD: 236 10X3/UL (ref 130–400)
POTASSIUM SERPL-SCNC: 3.8 MMOL/L (ref 3.5–5.1)
PROT SERPL-MCNC: 7.2 G/DL (ref 6.4–8.2)
PROTHROMBIN TIME: 18.4 SECONDS (ref 11.6–15)
RBC # BLD AUTO: 5.47 10X6/UL (ref 4.2–6.1)
SODIUM SERPL-SCNC: 136 MMOL/L (ref 136–145)
SP GR UR STRIP: 1.02 (ref 1–1.02)
TROPONIN I SERPL-MCNC: 0.02 NG/ML (ref 0–0.06)
UROBILINOGEN UR-MCNC: 4 MG/DL
WBC # BLD AUTO: 6.2 10X3/UL (ref 4.8–10.8)

## 2020-04-23 NOTE — NUR
ADMITTED TO ROOM FROM ER ALERT AND ORIENTIATED, DENIES PAIN REQUESTING COUGH
MEDICINE, INSTRUCTED WILL CHECK WITH DR ABOUT ORDERING COUGH MEDS, PT IS
UNABLE TO TELL ME NAMES OF HIS HOME MEDS OR WHEN HE TOOK ANY MEDS LAST,
DESCRIBES 3 PILLS HE TAKES AS A GOLD ONE A LITTLE BITY WHITE ONE AND ANOTHER
WHITE ONE, ORIENTIATED TO ROOM CALL LIGHT IN REACH,

## 2020-04-24 VITALS — SYSTOLIC BLOOD PRESSURE: 115 MMHG | DIASTOLIC BLOOD PRESSURE: 80 MMHG

## 2020-04-24 VITALS — DIASTOLIC BLOOD PRESSURE: 90 MMHG | SYSTOLIC BLOOD PRESSURE: 135 MMHG

## 2020-04-24 VITALS — DIASTOLIC BLOOD PRESSURE: 67 MMHG | SYSTOLIC BLOOD PRESSURE: 117 MMHG

## 2020-04-24 VITALS — SYSTOLIC BLOOD PRESSURE: 120 MMHG | DIASTOLIC BLOOD PRESSURE: 81 MMHG

## 2020-04-24 VITALS — SYSTOLIC BLOOD PRESSURE: 128 MMHG | DIASTOLIC BLOOD PRESSURE: 98 MMHG

## 2020-04-24 VITALS — DIASTOLIC BLOOD PRESSURE: 77 MMHG | SYSTOLIC BLOOD PRESSURE: 124 MMHG

## 2020-04-24 LAB
ANION GAP SERPL CALC-SCNC: 14.8 MMOL/L (ref 8–16)
BUN SERPL-MCNC: 10 MG/DL (ref 7–18)
CALCIUM SERPL-MCNC: 8.3 MG/DL (ref 8.5–10.1)
CHLORIDE SERPL-SCNC: 102 MMOL/L (ref 98–107)
CO2 SERPL-SCNC: 21.9 MMOL/L (ref 21–32)
CREAT SERPL-MCNC: 1.1 MG/DL (ref 0.6–1.3)
DIGOXIN SERPL-MCNC: 0.3 NG/ML (ref 0.9–2)
ERYTHROCYTE [DISTWIDTH] IN BLOOD BY AUTOMATED COUNT: 22.7 % (ref 11.5–14.5)
ERYTHROCYTE [SEDIMENTATION RATE] IN BLOOD: 8 MM/HR (ref 0–15)
GLUCOSE SERPL-MCNC: 124 MG/DL (ref 74–106)
HCT VFR BLD CALC: 36.4 % (ref 42–54)
HGB BLD-MCNC: 10.9 G/DL (ref 13.5–17.5)
LYMPHOCYTES NFR BLD AUTO: 28.5 % (ref 15–50)
MAGNESIUM SERPL-MCNC: 1.8 MG/DL (ref 1.8–2.4)
MCH RBC QN AUTO: 21 PG (ref 26–34)
MCHC RBC AUTO-ENTMCNC: 29.9 G/DL (ref 31–37)
MCV RBC: 70.3 FL (ref 80–100)
NEUTROPHILS NFR BLD AUTO: 57.2 % (ref 40–80)
OSMOLALITY SERPL CALC.SUM OF ELEC: 269 MOSM/KG (ref 275–300)
PHOSPHATE SERPL-MCNC: 3.3 MG/DL (ref 2.5–4.9)
PLATELET # BLD: 210 10X3/UL (ref 130–400)
PMV BLD AUTO: 10.3 FL (ref 7.4–10.4)
POTASSIUM SERPL-SCNC: 3.7 MMOL/L (ref 3.5–5.1)
RBC # BLD AUTO: 5.18 10X6/UL (ref 4.2–6.1)
SODIUM SERPL-SCNC: 135 MMOL/L (ref 136–145)
WBC # BLD AUTO: 6.2 10X3/UL (ref 4.8–10.8)

## 2020-04-24 NOTE — NUR
REPORT RECEIVED. BEDISDE SHIFT REPORT COMPLETE. PT IN BED WATCHING TV RR EVEN
AND UNLABORED. IV INFUSING PER ORDER. NO S/SX OF DISTRESS OBSERVED. CALL LIGHT
IN REACH. WILL CPOC.

## 2020-04-25 VITALS — DIASTOLIC BLOOD PRESSURE: 50 MMHG | SYSTOLIC BLOOD PRESSURE: 104 MMHG

## 2020-04-25 VITALS — DIASTOLIC BLOOD PRESSURE: 84 MMHG | SYSTOLIC BLOOD PRESSURE: 118 MMHG

## 2020-04-25 VITALS — SYSTOLIC BLOOD PRESSURE: 105 MMHG | DIASTOLIC BLOOD PRESSURE: 69 MMHG

## 2020-04-25 VITALS — DIASTOLIC BLOOD PRESSURE: 63 MMHG | SYSTOLIC BLOOD PRESSURE: 103 MMHG

## 2020-04-25 VITALS — SYSTOLIC BLOOD PRESSURE: 112 MMHG | DIASTOLIC BLOOD PRESSURE: 70 MMHG

## 2020-04-25 VITALS — SYSTOLIC BLOOD PRESSURE: 109 MMHG | DIASTOLIC BLOOD PRESSURE: 67 MMHG

## 2020-04-25 LAB
ANION GAP SERPL CALC-SCNC: 15.4 MMOL/L (ref 8–16)
BASOPHILS NFR BLD AUTO: 0.4 % (ref 0–2)
BUN SERPL-MCNC: 9 MG/DL (ref 7–18)
CALCIUM SERPL-MCNC: 9.1 MG/DL (ref 8.5–10.1)
CHLORIDE SERPL-SCNC: 100 MMOL/L (ref 98–107)
CO2 SERPL-SCNC: 25.9 MMOL/L (ref 21–32)
CREAT SERPL-MCNC: 1.3 MG/DL (ref 0.6–1.3)
EOSINOPHIL NFR BLD: 0.7 % (ref 0–7)
ERYTHROCYTE [DISTWIDTH] IN BLOOD BY AUTOMATED COUNT: 21.3 % (ref 11.5–14.5)
GLUCOSE SERPL-MCNC: 157 MG/DL (ref 74–106)
HCT VFR BLD CALC: 40.5 % (ref 42–54)
HGB BLD-MCNC: 12.8 G/DL (ref 13.5–17.5)
IMM GRANULOCYTES NFR BLD: 0.5 % (ref 0–5)
LYMPHOCYTES NFR BLD AUTO: 25 % (ref 15–50)
MAGNESIUM SERPL-MCNC: 1.8 MG/DL (ref 1.8–2.4)
MCH RBC QN AUTO: 21.3 PG (ref 26–34)
MCHC RBC AUTO-ENTMCNC: 31.6 G/DL (ref 31–37)
MCV RBC: 67.4 FL (ref 80–100)
MONOCYTES NFR BLD: 16 % (ref 2–11)
NEUTROPHILS NFR BLD AUTO: 57.4 % (ref 40–80)
OSMOLALITY SERPL CALC.SUM OF ELEC: 277 MOSM/KG (ref 275–300)
PHOSPHATE SERPL-MCNC: 4.6 MG/DL (ref 2.5–4.9)
PLATELET # BLD: 187 10X3/UL (ref 130–400)
POTASSIUM SERPL-SCNC: 3.3 MMOL/L (ref 3.5–5.1)
RBC # BLD AUTO: 6.01 10X6/UL (ref 4.2–6.1)
SODIUM SERPL-SCNC: 138 MMOL/L (ref 136–145)
WBC # BLD AUTO: 5.6 10X3/UL (ref 4.8–10.8)

## 2020-04-25 NOTE — NUR
PT HAS SHUT OFF HIS DOUBUTAIME DRIP TWICE DURING THE NIGHT. WHEN QUESTIONED
ABOUT IT HE JUST STATED "ISNT THAT THE ONE THAT MAKES ME GO TO THE BATHROOM"

## 2020-04-25 NOTE — NUR
RECEIVED REPORT. ASSUMED CARE OF PATIENT. CALL LIGHT WITHIN REACH. NO
DISTRESS. NOC NURSE REPORTS PATIENT CONTINUES TO TURN OFF HIS IV BUMEX AND
DOBUTAMINE. EDUCATED ON MEDICATIONS AND NOT TO TURN THE PUMPS OFF OR CHANGE
SETTINGS.

## 2020-04-26 VITALS — SYSTOLIC BLOOD PRESSURE: 102 MMHG | DIASTOLIC BLOOD PRESSURE: 73 MMHG

## 2020-04-26 VITALS — SYSTOLIC BLOOD PRESSURE: 97 MMHG | DIASTOLIC BLOOD PRESSURE: 50 MMHG

## 2020-04-26 VITALS — DIASTOLIC BLOOD PRESSURE: 78 MMHG | SYSTOLIC BLOOD PRESSURE: 105 MMHG

## 2020-04-26 VITALS — DIASTOLIC BLOOD PRESSURE: 63 MMHG | SYSTOLIC BLOOD PRESSURE: 98 MMHG

## 2020-04-26 LAB
ANION GAP SERPL CALC-SCNC: 14.2 MMOL/L (ref 8–16)
BASOPHILS NFR BLD AUTO: 0.4 % (ref 0–2)
BUN SERPL-MCNC: 11 MG/DL (ref 7–18)
CALCIUM SERPL-MCNC: 9 MG/DL (ref 8.5–10.1)
CHLORIDE SERPL-SCNC: 94 MMOL/L (ref 98–107)
CO2 SERPL-SCNC: 28.7 MMOL/L (ref 21–32)
CREAT SERPL-MCNC: 1.2 MG/DL (ref 0.6–1.3)
EOSINOPHIL NFR BLD: 1.5 % (ref 0–7)
ERYTHROCYTE [DISTWIDTH] IN BLOOD BY AUTOMATED COUNT: 21.2 % (ref 11.5–14.5)
GLUCOSE SERPL-MCNC: 102 MG/DL (ref 74–106)
HCT VFR BLD CALC: 43.1 % (ref 42–54)
HGB BLD-MCNC: 13.4 G/DL (ref 13.5–17.5)
IMM GRANULOCYTES NFR BLD: 0.5 % (ref 0–5)
LYMPHOCYTES NFR BLD AUTO: 28.9 % (ref 15–50)
MAGNESIUM SERPL-MCNC: 1.7 MG/DL (ref 1.8–2.4)
MCH RBC QN AUTO: 21 PG (ref 26–34)
MCHC RBC AUTO-ENTMCNC: 31.1 G/DL (ref 31–37)
MCV RBC: 67.4 FL (ref 80–100)
MONOCYTES NFR BLD: 19.2 % (ref 2–11)
NEUTROPHILS NFR BLD AUTO: 49.5 % (ref 40–80)
OSMOLALITY SERPL CALC.SUM OF ELEC: 266 MOSM/KG (ref 275–300)
PHOSPHATE SERPL-MCNC: 5.2 MG/DL (ref 2.5–4.9)
PLATELET # BLD: 283 10X3/UL (ref 130–400)
POTASSIUM SERPL-SCNC: 2.9 MMOL/L (ref 3.5–5.1)
RBC # BLD AUTO: 6.39 10X6/UL (ref 4.2–6.1)
SODIUM SERPL-SCNC: 134 MMOL/L (ref 136–145)
WBC # BLD AUTO: 5.5 10X3/UL (ref 4.8–10.8)

## 2020-04-26 NOTE — NUR
RECEIVED REPORT. ASSUMED CARE OF PATIENT. PATIENT RESTING IN BED WITH EYES
CLOSED. EASILY AROUSED. PATIENT NEW WEIGHT RECORDED AND DOBUTAMINE DRIP
ADJUSTED ACCORDINGLY TO NEW WEIGHT. NO DISTRESS. CALL LIGHT WITHIN REACH.

## 2020-04-27 VITALS — DIASTOLIC BLOOD PRESSURE: 75 MMHG | SYSTOLIC BLOOD PRESSURE: 96 MMHG

## 2020-04-27 VITALS — DIASTOLIC BLOOD PRESSURE: 64 MMHG | SYSTOLIC BLOOD PRESSURE: 99 MMHG

## 2020-04-27 VITALS — DIASTOLIC BLOOD PRESSURE: 75 MMHG | SYSTOLIC BLOOD PRESSURE: 108 MMHG

## 2020-04-27 VITALS — DIASTOLIC BLOOD PRESSURE: 70 MMHG | SYSTOLIC BLOOD PRESSURE: 102 MMHG

## 2020-04-27 VITALS — SYSTOLIC BLOOD PRESSURE: 110 MMHG | DIASTOLIC BLOOD PRESSURE: 76 MMHG

## 2020-04-27 VITALS — DIASTOLIC BLOOD PRESSURE: 74 MMHG | SYSTOLIC BLOOD PRESSURE: 114 MMHG

## 2020-04-27 LAB
ANION GAP SERPL CALC-SCNC: 12.2 MMOL/L (ref 8–16)
BASOPHILS NFR BLD AUTO: 0.8 % (ref 0–2)
BUN SERPL-MCNC: 20 MG/DL (ref 7–18)
CALCIUM SERPL-MCNC: 9 MG/DL (ref 8.5–10.1)
CHLORIDE SERPL-SCNC: 94 MMOL/L (ref 98–107)
CO2 SERPL-SCNC: 29.4 MMOL/L (ref 21–32)
CREAT SERPL-MCNC: 1.1 MG/DL (ref 0.6–1.3)
EOSINOPHIL NFR BLD: 4.1 % (ref 0–7)
ERYTHROCYTE [DISTWIDTH] IN BLOOD BY AUTOMATED COUNT: 21.6 % (ref 11.5–14.5)
GLUCOSE SERPL-MCNC: 101 MG/DL (ref 74–106)
HCT VFR BLD CALC: 46.3 % (ref 42–54)
HGB BLD-MCNC: 14.6 G/DL (ref 13.5–17.5)
IMM GRANULOCYTES NFR BLD: 0.4 % (ref 0–5)
LYMPHOCYTES NFR BLD AUTO: 36.1 % (ref 15–50)
MAGNESIUM SERPL-MCNC: 2 MG/DL (ref 1.8–2.4)
MCH RBC QN AUTO: 21.3 PG (ref 26–34)
MCHC RBC AUTO-ENTMCNC: 31.5 G/DL (ref 31–37)
MCV RBC: 67.6 FL (ref 80–100)
MONOCYTES NFR BLD: 20.6 % (ref 2–11)
NEUTROPHILS NFR BLD AUTO: 38 % (ref 40–80)
OSMOLALITY SERPL CALC.SUM OF ELEC: 267 MOSM/KG (ref 275–300)
PHOSPHATE SERPL-MCNC: 3.9 MG/DL (ref 2.5–4.9)
PLATELET # BLD: 268 10X3/UL (ref 130–400)
POTASSIUM SERPL-SCNC: 3.6 MMOL/L (ref 3.5–5.1)
RBC # BLD AUTO: 6.85 10X6/UL (ref 4.2–6.1)
SODIUM SERPL-SCNC: 132 MMOL/L (ref 136–145)
WBC # BLD AUTO: 4.9 10X3/UL (ref 4.8–10.8)

## 2020-04-27 NOTE — NUR
RECEIVED BEDSIDE REPORT. PATIENT IS ALERT AND ORIENTED, RESTING COMFORTABLY IN
BED. RESPIRATIONS ARE EVEN AND UNLABORED NO S/S OF DISTRESS. NO C/O PAIN.
PATIENT STATED THAT HE WAS SUPPOSE TO BE D/C'd TONIGHT. THIS RN DOUBLE CHECKED
ORDERS AND EXPLAINED THAT CURRENTLY THERE WAS NOT A DISCHARGE ORDER.
PATIENT DOBUTAMINE WAS INFUSING AT THIS TIME. PATIENT REQUESTED ICE CREM. WHEN
THIS RN RETURNED TO PATIENT ROOM. PATIENT HAD TURNED IS IV PUMP OFF.
CALL LIGHT WITHIN REACH. WILL CPOC.

## 2020-04-27 NOTE — NUR
PATIENT REFUSING TO WEAR TELEMETRY. EDUCATED PATIENT TO THE IMPORTANCE OF
WEARING THE TELEMETRY BOX. PATIENT CONTIUES TO REFUSE.

## 2020-04-27 NOTE — NUR
Nutrition Follow-up:
Overall good PO intake.
Diet: Diabetic
PO intake: 89% avg x last 7 meals
Wt: 148# (4/26); 156# (4/25); 188# (4/24) - diuresing
Labs noted: Na 132, Glu 101
Meds noted: Bumex
-Monitor wt; noted daily wts ordered.
-RD following.

## 2020-04-28 VITALS — DIASTOLIC BLOOD PRESSURE: 57 MMHG | SYSTOLIC BLOOD PRESSURE: 114 MMHG

## 2020-04-28 VITALS — SYSTOLIC BLOOD PRESSURE: 102 MMHG | DIASTOLIC BLOOD PRESSURE: 70 MMHG

## 2020-04-28 VITALS — DIASTOLIC BLOOD PRESSURE: 70 MMHG | SYSTOLIC BLOOD PRESSURE: 102 MMHG

## 2020-04-28 LAB
ALBUMIN SERPL-MCNC: 3.2 G/DL (ref 3.4–5)
ALP SERPL-CCNC: 124 U/L (ref 30–120)
ALT SERPL-CCNC: 34 U/L (ref 10–68)
ANION GAP SERPL CALC-SCNC: 12.6 MMOL/L (ref 8–16)
BASOPHILS NFR BLD AUTO: 1 % (ref 0–2)
BILIRUB SERPL-MCNC: 0.98 MG/DL (ref 0.2–1.3)
BUN SERPL-MCNC: 27 MG/DL (ref 7–18)
CALCIUM SERPL-MCNC: 8.8 MG/DL (ref 8.5–10.1)
CHLORIDE SERPL-SCNC: 94 MMOL/L (ref 98–107)
CO2 SERPL-SCNC: 29.1 MMOL/L (ref 21–32)
CREAT SERPL-MCNC: 1.2 MG/DL (ref 0.6–1.3)
EOSINOPHIL NFR BLD: 4.8 % (ref 0–7)
ERYTHROCYTE [DISTWIDTH] IN BLOOD BY AUTOMATED COUNT: 21.8 % (ref 11.5–14.5)
GLOBULIN SER-MCNC: 5.3 G/L
GLUCOSE SERPL-MCNC: 149 MG/DL (ref 74–106)
HCT VFR BLD CALC: 45.2 % (ref 42–54)
HGB BLD-MCNC: 14.1 G/DL (ref 13.5–17.5)
IMM GRANULOCYTES NFR BLD: 0.2 % (ref 0–5)
LYMPHOCYTES NFR BLD AUTO: 39.8 % (ref 15–50)
MCH RBC QN AUTO: 21.2 PG (ref 26–34)
MCHC RBC AUTO-ENTMCNC: 31.2 G/DL (ref 31–37)
MCV RBC: 67.9 FL (ref 80–100)
MONOCYTES NFR BLD: 17.8 % (ref 2–11)
NEUTROPHILS NFR BLD AUTO: 36.4 % (ref 40–80)
OSMOLALITY SERPL CALC.SUM OF ELEC: 272 MOSM/KG (ref 275–300)
PLATELET # BLD: 295 10X3/UL (ref 130–400)
POTASSIUM SERPL-SCNC: 3.7 MMOL/L (ref 3.5–5.1)
PROT SERPL-MCNC: 8.5 G/DL (ref 6.4–8.2)
RBC # BLD AUTO: 6.66 10X6/UL (ref 4.2–6.1)
SODIUM SERPL-SCNC: 132 MMOL/L (ref 136–145)
WBC # BLD AUTO: 5.2 10X3/UL (ref 4.8–10.8)

## 2020-04-28 NOTE — NUR
PT WAS SEEN OUTSIDE BY HOUSE SUPERVISPR LEAVING IN A BLACK SUV WITH IV STILL
IN ARM. DR. NICKERSON, CIARA JOSEPH, AND JOSE LUIS NURSE MANAGER MADE AWARE AND
THEY ALL VERBALIZED UNDERSTANDING.WILL FILL OUT INCIDENT REPORT.

## 2020-04-28 NOTE — NUR
PT DEMANDING TO BE DISCHARGED. I STATED TO PT THAT IS UP TO THE DOCTOR NOT ME.
HE STATES "YOU NEED TO GO FIND THAT DOCTOR AND GET THEM IN HERE." I STATED TO
HIM THE DOCTOR HAS NO GOTTEN HERE YET AND THEY WILL COME SEE HIM TODAY. PT
VERBALIZED UNDERSTANDING.

## 2020-04-28 NOTE — NUR
SPOKE WITH CIARA JOSEPH AND STATED TO HER PT'S RBC IS 6.66  AND PT WANTS TO
BE DISCHARGED TODAY. SHE STATES SHE IS NOT WORRIED ABOUT RBC AND IF CARDIOLOGY
SEES HIM AND SAYS HE CAN BE DISCHARGED THEY WILL DISCHARGE HIM. I VERBALIZED
UNDERSTANDING.

## 2020-04-28 NOTE — NUR
WENT TO PATIENT ROOM TO TURN OFF DOBUTAMINE DRIP PER ORDER. PATIENT HAD
ALREADY DISCONNECTED HIMSELF FROM THE IV AND DRAPED TUBING OVER IV PUMP
DRIPPING ONTO THE FLOOR.

## 2020-04-28 NOTE — MORECARE
CASE MANAGEMENT DISCHARGE SUMMARY
 
 
PATIENT: RYANNE BOGGS                   UNIT: [id number]
ACCOUNT#: [id number]                       ADM DATE: 20
AGE: 44     : [birth year]  SEX: M            ROOM/BED: D.5617    
AUTHOR: BRYCE HUTCHINS                             PHYSICIAN:                               
 
REFERRING PHYSICIAN: JAMIE POLO MD               
DATE OF SERVICE: 20
Discharge Plan
 
 
Patient Name: RYANNE BOGGS
Facility: [facility]:[place]
Encounter #: [id number]
Medical Record #: [id number]
: 1975
Planned Disposition: Home
Anticipated Discharge Date: 20
 
Discharge Date: 
Expected LOS: 5
Initial Reviewer: [id number]
Initial Review Date: 2020
Generated: 20  10:21 am 
Comments
 
DCP- Discharge Planning
 
Updated by [id number]: Carmen Snider on 20   8:21 am CT
Patient Name: RYANNE BOGGS                                     
Admission Status: ER   
Accout number: [id number]                              
Admission Date: 2020   
: 1975                                                        
Admission Diagnosis:   
Attending: JAMIE POLO                                                
Current LOS:  5   
  
Anticipated DC Date: 2020   
Planned Disposition: Home   
Primary Insurance: MEDICARE A & B   
  
  
Discharge Planning Comments: CM met with patient to complete initial dc 
planning assessment.  CM educated patient on the CM role and verbal consent 
given by patient to complete assessment.   Patient lives at home alone.  At 
discharge patient plans to return  and feels this is a safe discharge.  CM 
discussed availability of home health, rehab services, and medical equipment. 
 
Patient denied known discharge needs at this time. Patient had poor eye 
contact during assessment. He states he's about to call his ride to come and 
get him. I informed him that he did not have discharge orders yet. Patient 
states he does not have a glucometer and does not want one. States "that'll 
just be dirty." States he does have a scale, but does not weigh daily. I 
informed him that he should weigh himself every day and notify the physician 
for weight gain as instructed. I gave him the number to [facility] 
and physician referral line. CM will continue to follow and will assist as 
needed with dc plans/needs.    
  
  
  
  
  
  
: Carmen Snider
 DCPIA - Discharge Planning Initial Assessment
 
Updated by [id number]: Carmen Snider on 20   9:15 am
*  Is the patient Alert and Oriented?
Yes
*  How many steps to enter\exit or inside your home? 0/0 *  PCP None *  [facility]
*  Preadmission Environment
Home Alone
*  ADLs
Independent
*  Equipment
Other
*  Other Equipment
Scale
*  List name and contact numbers for known caregivers / representatives who 
currently or will assist patient after discharge:
[employer]ago  [phone]
*  Verbal permission to speak to the caregivers and representatives has been 
obtained from the patient.
Yes
*  Community resources currently utilized
None
*  Additional services required to return to the preadmission environment?
No
*  Can the patient safely return to the preadmission environment?
Yes
*  Has this patient been hospitalized within the prior 30 days at any 
hospital?
Yes
 
 
 
 
 
Coverage Notice
 
 
Reviewer: [id number] - Carmen Snider
 
Notice Issued Date-Time: 2020   9:11
Notice Type: IM Discharge Notice
 
Notice Delivered To: Patient
Relationship to Patient: Self
Representative Name: 
 
Delivery Method: HAND - Hand Delivered
Roseann Days:
Prior Verbal Notification: 
 
Recipient Understood Notice: Yes
Recipient Signature: Yes
Med Rec Note Co-signed by Attending:
 
Coverage Notice Comment:  IMM explained, signed, given, copy placed in MR
Patient Name: RYANNE BOGGS
Encounter #: [id number]
Page [phone]
 
 
 
 
 
Electronically Signed by BRYCE HUTCHINS on 20 at 0921
 
 
 
 
 
 
**All edits/amendments must be made on the electronic document**
 
DICTATION DATE: 20     : SARWAT  20     
RPT#: [id number]                                DC DATE:        
                                               STATUS: ADM IN  
[facility]
 [place] [address]
***END OF REPORT***

## 2020-05-20 ENCOUNTER — HOSPITAL ENCOUNTER (INPATIENT)
Dept: HOSPITAL 84 - D.ER | Age: 45
LOS: 3 days | Discharge: HOME | DRG: 291 | End: 2020-05-23
Attending: INTERNAL MEDICINE | Admitting: INTERNAL MEDICINE
Payer: MEDICARE

## 2020-05-20 VITALS
HEIGHT: 69 IN | HEIGHT: 69 IN | BODY MASS INDEX: 27.4 KG/M2 | WEIGHT: 185 LBS | BODY MASS INDEX: 27.4 KG/M2 | WEIGHT: 185 LBS | BODY MASS INDEX: 27.4 KG/M2

## 2020-05-20 VITALS — SYSTOLIC BLOOD PRESSURE: 134 MMHG | DIASTOLIC BLOOD PRESSURE: 98 MMHG

## 2020-05-20 VITALS — SYSTOLIC BLOOD PRESSURE: 123 MMHG | DIASTOLIC BLOOD PRESSURE: 96 MMHG

## 2020-05-20 DIAGNOSIS — E11.9: ICD-10-CM

## 2020-05-20 DIAGNOSIS — I50.23: ICD-10-CM

## 2020-05-20 DIAGNOSIS — E87.6: ICD-10-CM

## 2020-05-20 DIAGNOSIS — F17.203: ICD-10-CM

## 2020-05-20 DIAGNOSIS — G93.41: ICD-10-CM

## 2020-05-20 DIAGNOSIS — I11.0: Primary | ICD-10-CM

## 2020-05-20 DIAGNOSIS — D50.9: ICD-10-CM

## 2020-05-20 DIAGNOSIS — Z91.14: ICD-10-CM

## 2020-05-20 LAB
ALBUMIN SERPL-MCNC: 3.2 G/DL (ref 3.4–5)
ALP SERPL-CCNC: 98 U/L (ref 30–120)
ALT SERPL-CCNC: 27 U/L (ref 10–68)
ANION GAP SERPL CALC-SCNC: 12.5 MMOL/L (ref 8–16)
APTT BLD: 32.2 SECONDS (ref 22.8–39.4)
BASOPHILS NFR BLD AUTO: 0.4 % (ref 0–2)
BILIRUB SERPL-MCNC: 2.39 MG/DL (ref 0.2–1.3)
BUN SERPL-MCNC: 10 MG/DL (ref 7–18)
CALCIUM SERPL-MCNC: 8.5 MG/DL (ref 8.5–10.1)
CHLORIDE SERPL-SCNC: 103 MMOL/L (ref 98–107)
CK MB SERPL-MCNC: 1.2 U/L (ref 0–3.6)
CK SERPL-CCNC: 105 UL (ref 21–232)
CO2 SERPL-SCNC: 25.8 MMOL/L (ref 21–32)
CREAT SERPL-MCNC: 1.2 MG/DL (ref 0.6–1.3)
EOSINOPHIL NFR BLD: 1.5 % (ref 0–7)
ERYTHROCYTE [DISTWIDTH] IN BLOOD BY AUTOMATED COUNT: 23.6 % (ref 11.5–14.5)
GLOBULIN SER-MCNC: 4.2 G/L
GLUCOSE SERPL-MCNC: 97 MG/DL (ref 74–106)
HCT VFR BLD CALC: 37.2 % (ref 42–54)
HGB BLD-MCNC: 11.3 G/DL (ref 13.5–17.5)
IMM GRANULOCYTES NFR BLD: 0.4 % (ref 0–5)
INR PPP: 1.53 (ref 0.85–1.17)
LYMPHOCYTES NFR BLD AUTO: 29.3 % (ref 15–50)
MCH RBC QN AUTO: 21 PG (ref 26–34)
MCHC RBC AUTO-ENTMCNC: 30.4 G/DL (ref 31–37)
MCV RBC: 69.3 FL (ref 80–100)
MONOCYTES NFR BLD: 13.3 % (ref 2–11)
NEUTROPHILS NFR BLD AUTO: 55.1 % (ref 40–80)
NT-PROBNP SERPL-MCNC: 6467 PG/ML (ref 0–125)
OSMOLALITY SERPL CALC.SUM OF ELEC: 274 MOSM/KG (ref 275–300)
PLATELET # BLD: 203 10X3/UL (ref 130–400)
POTASSIUM SERPL-SCNC: 3.3 MMOL/L (ref 3.5–5.1)
PROT SERPL-MCNC: 7.4 G/DL (ref 6.4–8.2)
PROTHROMBIN TIME: 18.2 SECONDS (ref 11.6–15)
RBC # BLD AUTO: 5.37 10X6/UL (ref 4.2–6.1)
SODIUM SERPL-SCNC: 138 MMOL/L (ref 136–145)
TROPONIN I SERPL-MCNC: 0.02 NG/ML (ref 0–0.06)
WBC # BLD AUTO: 4.6 10X3/UL (ref 4.8–10.8)

## 2020-05-20 NOTE — NUR
PT FROM ER VIA STRETCHER, PT AAO X 3, RESP EVEN AND UNLABORED. NO DISTRESS
NOTED, CL IN REACH, SR UP X 2.

## 2020-05-20 NOTE — NUR
PT VOIDED APPROX 1400ML CLEAR YELLOW URINE, PT STATES THAT HE SPILLED ON HIS
GOWN. STATES "I CLEANED MYSELF UP." DENIES NEEDS AT THIS TIME. SITTING IN CHAIR
AT BEDSIDE, NO SIGNS DISTRESS NOTED. WILL CONTINUE TO MONITOR.

## 2020-05-20 NOTE — NUR
PT VOIDED APPROX 1400ML CLEAR YELLOW URINE. PT STATES THAT HE SPILLED ON HIS
GOWN, STATES "I CLEANED MYSELF UP." DENIES NEEDS AT THIS TIME, SITTING IN CHAIR
AT BEDSIDE. WILL CONTINUE TO MONITOR.

## 2020-05-21 VITALS — SYSTOLIC BLOOD PRESSURE: 101 MMHG | DIASTOLIC BLOOD PRESSURE: 81 MMHG

## 2020-05-21 VITALS — DIASTOLIC BLOOD PRESSURE: 73 MMHG | SYSTOLIC BLOOD PRESSURE: 104 MMHG

## 2020-05-21 VITALS — DIASTOLIC BLOOD PRESSURE: 80 MMHG | SYSTOLIC BLOOD PRESSURE: 120 MMHG

## 2020-05-21 VITALS — DIASTOLIC BLOOD PRESSURE: 81 MMHG | SYSTOLIC BLOOD PRESSURE: 115 MMHG

## 2020-05-21 LAB
ALBUMIN SERPL-MCNC: 2.9 G/DL (ref 3.4–5)
ALP SERPL-CCNC: 97 U/L (ref 30–120)
ALT SERPL-CCNC: 27 U/L (ref 10–68)
AMPHETAMINES UR QL SCN: NEGATIVE QUAL
ANION GAP SERPL CALC-SCNC: 11.9 MMOL/L (ref 8–16)
BACTERIA #/AREA URNS HPF: (no result) /HPF
BARBITURATES UR QL SCN: NEGATIVE QUAL
BASOPHILS NFR BLD AUTO: 1 % (ref 0–2)
BENZODIAZ UR QL SCN: NEGATIVE QUAL
BILIRUB SERPL-MCNC: 2 MG/DL (ref 0.2–1.3)
BILIRUB SERPL-MCNC: NEGATIVE MG/DL
BUN SERPL-MCNC: 13 MG/DL (ref 7–18)
BZE UR QL SCN: NEGATIVE QUAL
CALCIUM SERPL-MCNC: 8.3 MG/DL (ref 8.5–10.1)
CANNABINOIDS UR QL SCN: POSITIVE QUAL
CHLORIDE SERPL-SCNC: 104 MMOL/L (ref 98–107)
CO2 SERPL-SCNC: 27.2 MMOL/L (ref 21–32)
CREAT SERPL-MCNC: 1.2 MG/DL (ref 0.6–1.3)
DIGOXIN SERPL-MCNC: 0.06 NG/ML (ref 0.9–2)
EOSINOPHIL NFR BLD: 1.3 % (ref 0–7)
ERYTHROCYTE [DISTWIDTH] IN BLOOD BY AUTOMATED COUNT: 23.7 % (ref 11.5–14.5)
GLOBULIN SER-MCNC: 4.1 G/L
GLUCOSE SERPL-MCNC: 135 MG/DL (ref 74–106)
GLUCOSE SERPL-MCNC: NEGATIVE MG/DL
HCT VFR BLD CALC: 36.7 % (ref 42–54)
HGB BLD-MCNC: 11.4 G/DL (ref 13.5–17.5)
IMM GRANULOCYTES NFR BLD: 0.3 % (ref 0–5)
KETONES UR STRIP-MCNC: NEGATIVE MG/DL
LYMPHOCYTES NFR BLD AUTO: 32.7 % (ref 15–50)
MAGNESIUM SERPL-MCNC: 1.9 MG/DL (ref 1.8–2.4)
MCH RBC QN AUTO: 21.6 PG (ref 26–34)
MCHC RBC AUTO-ENTMCNC: 31.1 G/DL (ref 31–37)
MCV RBC: 69.4 FL (ref 80–100)
MONOCYTES NFR BLD: 12.8 % (ref 2–11)
NEUTROPHILS NFR BLD AUTO: 51.9 % (ref 40–80)
NITRITE UR-MCNC: NEGATIVE MG/ML
OPIATES UR QL SCN: NEGATIVE QUAL
OSMOLALITY SERPL CALC.SUM OF ELEC: 280 MOSM/KG (ref 275–300)
PCP UR QL SCN: NEGATIVE QUAL
PH UR STRIP: 5 [PH] (ref 5–6)
PHOSPHATE SERPL-MCNC: 3.4 MG/DL (ref 2.5–4.9)
PLATELET # BLD: 210 10X3/UL (ref 130–400)
POTASSIUM SERPL-SCNC: 3.1 MMOL/L (ref 3.5–5.1)
PROT SERPL-MCNC: 7 G/DL (ref 6.4–8.2)
RBC # BLD AUTO: 5.29 10X6/UL (ref 4.2–6.1)
RBC #/AREA URNS HPF: (no result) /HPF (ref 0–5)
SODIUM SERPL-SCNC: 140 MMOL/L (ref 136–145)
SP GR UR STRIP: 1.01 (ref 1–1.02)
SQUAMOUS #/AREA URNS HPF: (no result) /HPF (ref 0–5)
UROBILINOGEN UR-MCNC: 8 MG/DL
WBC # BLD AUTO: 3.8 10X3/UL (ref 4.8–10.8)
WBC #/AREA URNS HPF: (no result) /HPF

## 2020-05-21 NOTE — NUR
PT WALT GREEN, RR EVEN AND UNLABORED. RECIEVED APPLE JUICE PER REQUEST. CALL
LIGHT WITHIN REACH. DENIES FURTHER NEEDS OR PAIN AT THIS TIME. BED IN LOWEST
POSITION. INSTRUCTED TO USE URINAL FOR ACCURATE I&O. WILL CONTINUE TO MONITOR.

## 2020-05-22 VITALS — SYSTOLIC BLOOD PRESSURE: 109 MMHG | DIASTOLIC BLOOD PRESSURE: 73 MMHG

## 2020-05-22 VITALS — SYSTOLIC BLOOD PRESSURE: 101 MMHG | DIASTOLIC BLOOD PRESSURE: 81 MMHG

## 2020-05-22 VITALS — SYSTOLIC BLOOD PRESSURE: 110 MMHG | DIASTOLIC BLOOD PRESSURE: 73 MMHG

## 2020-05-22 VITALS — SYSTOLIC BLOOD PRESSURE: 129 MMHG | DIASTOLIC BLOOD PRESSURE: 63 MMHG

## 2020-05-22 VITALS — SYSTOLIC BLOOD PRESSURE: 119 MMHG | DIASTOLIC BLOOD PRESSURE: 86 MMHG

## 2020-05-22 VITALS — SYSTOLIC BLOOD PRESSURE: 111 MMHG | DIASTOLIC BLOOD PRESSURE: 86 MMHG

## 2020-05-22 LAB
ALBUMIN SERPL-MCNC: 2.6 G/DL (ref 3.4–5)
ALP SERPL-CCNC: 88 U/L (ref 30–120)
ALT SERPL-CCNC: 18 U/L (ref 10–68)
ANION GAP SERPL CALC-SCNC: 7.9 MMOL/L (ref 8–16)
BASOPHILS NFR BLD AUTO: 0.2 % (ref 0–2)
BILIRUB SERPL-MCNC: 1.14 MG/DL (ref 0.2–1.3)
BUN SERPL-MCNC: 14 MG/DL (ref 7–18)
CALCIUM SERPL-MCNC: 7.8 MG/DL (ref 8.5–10.1)
CHLORIDE SERPL-SCNC: 102 MMOL/L (ref 98–107)
CO2 SERPL-SCNC: 27.9 MMOL/L (ref 21–32)
CREAT SERPL-MCNC: 1.3 MG/DL (ref 0.6–1.3)
EOSINOPHIL NFR BLD: 2.7 % (ref 0–7)
ERYTHROCYTE [DISTWIDTH] IN BLOOD BY AUTOMATED COUNT: 23.1 % (ref 11.5–14.5)
GLOBULIN SER-MCNC: 3.6 G/L
GLUCOSE SERPL-MCNC: 158 MG/DL (ref 74–106)
HCT VFR BLD CALC: 31.7 % (ref 42–54)
HGB BLD-MCNC: 9.7 G/DL (ref 13.5–17.5)
IMM GRANULOCYTES NFR BLD: 0.2 % (ref 0–5)
LYMPHOCYTES NFR BLD AUTO: 45 % (ref 15–50)
MAGNESIUM SERPL-MCNC: 1.7 MG/DL (ref 1.8–2.4)
MCH RBC QN AUTO: 20.9 PG (ref 26–34)
MCHC RBC AUTO-ENTMCNC: 30.6 G/DL (ref 31–37)
MCV RBC: 68.3 FL (ref 80–100)
MONOCYTES NFR BLD: 15.7 % (ref 2–11)
NEUTROPHILS NFR BLD AUTO: 36.2 % (ref 40–80)
OSMOLALITY SERPL CALC.SUM OF ELEC: 273 MOSM/KG (ref 275–300)
PHOSPHATE SERPL-MCNC: 3.8 MG/DL (ref 2.5–4.9)
PLATELET # BLD: 154 10X3/UL (ref 130–400)
POTASSIUM SERPL-SCNC: 2.8 MMOL/L (ref 3.5–5.1)
PROT SERPL-MCNC: 6.2 G/DL (ref 6.4–8.2)
RBC # BLD AUTO: 4.64 10X6/UL (ref 4.2–6.1)
SODIUM SERPL-SCNC: 135 MMOL/L (ref 136–145)
WBC # BLD AUTO: 4.1 10X3/UL (ref 4.8–10.8)

## 2020-05-22 NOTE — NUR
PT WIEGHT THIN AM 185LBS. PT PULLED IV ACCSESS OUT WITH DOUBUTAMINE DRIP
INFUSING. DRIP D/C'D BY PROVIDER. PT A/O X4. RR EVEN AND UNLABORED. BED LOW
CALL LIGHT WITHIN REACH. WILL CONTINUE TO MONITOR.

## 2020-05-23 VITALS — DIASTOLIC BLOOD PRESSURE: 83 MMHG | SYSTOLIC BLOOD PRESSURE: 118 MMHG

## 2020-05-23 VITALS — DIASTOLIC BLOOD PRESSURE: 78 MMHG | SYSTOLIC BLOOD PRESSURE: 107 MMHG

## 2020-05-23 VITALS — SYSTOLIC BLOOD PRESSURE: 112 MMHG | DIASTOLIC BLOOD PRESSURE: 81 MMHG

## 2020-05-23 VITALS — DIASTOLIC BLOOD PRESSURE: 80 MMHG | SYSTOLIC BLOOD PRESSURE: 109 MMHG

## 2020-05-23 LAB
ALBUMIN SERPL-MCNC: 3 G/DL (ref 3.4–5)
ALP SERPL-CCNC: 100 U/L (ref 30–120)
ALT SERPL-CCNC: 20 U/L (ref 10–68)
ANION GAP SERPL CALC-SCNC: 13 MMOL/L (ref 8–16)
BASOPHILS NFR BLD AUTO: 0.4 % (ref 0–2)
BILIRUB SERPL-MCNC: 1.37 MG/DL (ref 0.2–1.3)
BUN SERPL-MCNC: 14 MG/DL (ref 7–18)
CALCIUM SERPL-MCNC: 8.3 MG/DL (ref 8.5–10.1)
CHLORIDE SERPL-SCNC: 99 MMOL/L (ref 98–107)
CO2 SERPL-SCNC: 25 MMOL/L (ref 21–32)
CREAT SERPL-MCNC: 1.4 MG/DL (ref 0.6–1.3)
EOSINOPHIL NFR BLD: 2.2 % (ref 0–7)
ERYTHROCYTE [DISTWIDTH] IN BLOOD BY AUTOMATED COUNT: 23.1 % (ref 11.5–14.5)
GLOBULIN SER-MCNC: 4.2 G/L
GLUCOSE SERPL-MCNC: 91 MG/DL (ref 74–106)
HCT VFR BLD CALC: 35.8 % (ref 42–54)
HGB BLD-MCNC: 11 G/DL (ref 13.5–17.5)
IMM GRANULOCYTES NFR BLD: 0.4 % (ref 0–5)
LYMPHOCYTES NFR BLD AUTO: 39.8 % (ref 15–50)
MAGNESIUM SERPL-MCNC: 2 MG/DL (ref 1.8–2.4)
MCH RBC QN AUTO: 21.3 PG (ref 26–34)
MCHC RBC AUTO-ENTMCNC: 30.7 G/DL (ref 31–37)
MCV RBC: 69.2 FL (ref 80–100)
MONOCYTES NFR BLD: 16 % (ref 2–11)
NEUTROPHILS NFR BLD AUTO: 41.2 % (ref 40–80)
OSMOLALITY SERPL CALC.SUM OF ELEC: 266 MOSM/KG (ref 275–300)
PHOSPHATE SERPL-MCNC: 4.3 MG/DL (ref 2.5–4.9)
PLATELET # BLD: 157 10X3/UL (ref 130–400)
POTASSIUM SERPL-SCNC: 4 MMOL/L (ref 3.5–5.1)
PROT SERPL-MCNC: 7.2 G/DL (ref 6.4–8.2)
RBC # BLD AUTO: 5.17 10X6/UL (ref 4.2–6.1)
SODIUM SERPL-SCNC: 133 MMOL/L (ref 136–145)
WBC # BLD AUTO: 4.6 10X3/UL (ref 4.8–10.8)

## 2020-05-23 NOTE — NUR
20 GAUGE IV REMOVED FROM RIGHT FOREARM. PATIENT IS BEING DISCHARGED. CATHETER
TIP INTACT. NO BLEEDING FROM SITE. 2X2 GAUZE APPLIED AND SECURED WITH BANDAID.
TOLERATED IV REMOVAL WELL.

## 2020-05-23 NOTE — NUR
RECEIVED REPORT. ASSUMED CARE OF PATIENT. PATIENT RESTING IN BED WITH EYES
CLOSED. RESP EVEN AND UNLABORED. PATIENT REFUSES TELEMETRY. CALL LIGHT WITHIN
REACH. NO DISTRESS.

## 2020-05-23 NOTE — NUR
PATIENT LEFT UNIT VIA WHEELCHAIR AT THIS TIME. PATIENT DISCHARGED TO HOME WITH
FAMILY. NO DISTRESS UPON LEAVING UNIT. PATIENT LEFT UNIT WITH ALL PERSONAL
BELONGINGS.

## 2020-05-23 NOTE — NUR
PATIENT NOTIFIED THIS WRITER THAT HE WILL NOT BE ABLE TO LEAVE UNTIL 1800
TODAY DUE TO HIS RIDE CAN NOT PICK HIM UP UNTIL THEN.

## 2020-05-23 NOTE — NUR
SPOKE TO PATIENTS BROTHER KALIA AND HE DOESN'T GET OFF WORK TILL 6PM BUT WILL
CALL HIS COUSIN TO COME AND GET THE PATIENT. PATIENT HAS BEEN PROVIDED WITH
DISCHARGE INSTRUCTIONS. PATIENT VERBALIZED UNDERSTANDING OF ALL INSTRUCTIONS
PROVIDED.

## 2020-05-24 NOTE — MORECARE
CASE MANAGEMENT DISCHARGE SUMMARY
 
 
PATIENT: RYANNE BOGGS                   UNIT: [id number]
ACCOUNT#: [id number]                       ADM DATE: 20
AGE: 44     : [birth year]  SEX: M            ROOM/BED: D.2106    
AUTHOR: BRYCE HUTCHINS                             PHYSICIAN:                               
 
REFERRING PHYSICIAN: JAMIE POLO MD               
DATE OF SERVICE: 20
Discharge Plan
 
 
Patient Name: RYANNE BOGGS
[facility]
Encounter #: [id number]
Medical Record #: [id number]
: 1975
Planned Disposition: Home
Anticipated Discharge Date: 
 
Discharge Date: 2020
Expected LOS: 
Initial Reviewer: [email or web address]
Initial Review Date: 2020
Generated: 20   6:27 pm 
  
 
 
 
 
 
 
 
Patient Name: RYANNE BOGGS
 
Encounter #: [id number]
Page [id number]
 
 
 
 
 
Electronically Signed by BRYCE HUTCHINS on 20 at 3717
 
 
 
 
 
 
**All edits/amendments must be made on the electronic document**
 
DICTATION DATE: 20     : SARWAT  20     
RPT#: [id number]                                DC DATE:20
                                               STATUS: DIS IN  
[facility]
 [place] [address]
***END OF REPORT***

## 2020-05-24 NOTE — MORECARE
CASE MANAGEMENT DISCHARGE SUMMARY
 
 
PATIENT: RYANNE BOGGS                   UNIT: [id number]
ACCOUNT#: [id number]                       ADM DATE: 20
AGE: 44     : 77  SEX: M            ROOM/BED: D.9620    
AUTHOR: CUONG,DOC                             PHYSICIAN:                               
 
REFERRING PHYSICIAN: JAMIE POLO MD               
DATE OF SERVICE: 20
Discharge Plan
 
 
Patient Name: RYANNE BOGGS
Facility: [facility]:[place]
Encounter #: [id number]
Medical Record #: [id number]
: 1975
Planned Disposition: Home
Anticipated Discharge Date: 
 
Discharge Date: 2020
Expected LOS: 
Initial Reviewer: [id number]
Initial Review Date: 2020
Generated: 20   6:47 pm 
Comments
 
DCP- Discharge Planning
 
Updated by [email or web address]: Cheryl Espinoza on 20   4:45 pm CT
Late Entry 20  
  
Patient Name: RYANNE BOGGS                                     
Admission Status: ER   
Accout number: [id number]                              
Admission Date: 2020   
: 1975                                                        
Admission Diagnosis:HEART FAILURE, UNSPECIFIED   
Attending: JAMIE POLO                                                
Current LOS:  3   
  
Anticipated DC Date:    
Planned Disposition: Home   
Primary Insurance: MEDICARE A & B   
  
  
Discharge Planning Comments:   
  
CM met with patient to complete initial dc planning assessment.  CM educated 
 
patient on the CM role and verbal consent given by patient to complete 
assessment. Patient lives at home alone. Patient is independent. At discharge 
patient plans to return home and feels this is a safe discharge. CM found it 
very difficult to communicate with patient. He wouldn't maintain eye contact 
and his speech was garbled.  CM discussed availability of home health, rehab 
services, and medical equipment. CM asked patient about obtaining his 
medications and stated he could get them. CM tried to speech to patient about 
obtaining a PCP to cut down on his admissions.  CM gave information on 
[facility]. Patient just rolled over in bed. d/c IMM  signed 20 
@ 1302 Patient will have family to transport home. Patient denied known 
discharge needs at this time. CM will continue to follow and will assist as 
needed with dc plans/needs.     
  
  
  
  
: Cheryl Espinoza
 DCPIA - Discharge Planning Initial Assessment
 
Updated by [id number]: Cheryl Espinoza on 20   5:33 pm
*  Is the patient Alert and Oriented?
Yes
*  How many steps to enter\exit or inside your home?
 
*  PCP
NO PCP
*  Pharmacy
[facility]
*  Preadmission Environment
Home Alone
*  ADLs
Independent
*  Equipment
None
*  List name and contact numbers for known caregivers / representatives who 
currently or will assist patient after discharge:
KALIA ALVAREZ - [phone]
*  Verbal permission to speak to the caregivers and representatives has been 
obtained from the patient.
N/A
*  Community resources currently utilized
None
*  Additional services required to return to the preadmission environment?
No
*  Can the patient safely return to the preadmission environment?
Yes
*  Has this patient been hospitalized within the prior 30 days at any 
hospital?
Yes
 
 
 
 
 
 
Coverage Notice
 
Reviewer: [email or web address] Leigh Ann Espinoza
 
Notice Issued Date-Time: 2020  13:02
Notice Type: IM Discharge Notice
 
Notice Delivered To: Patient
Relationship to Patient: Self
Representative Name: 
 
Delivery Method: HAND - Hand Delivered
Roseann Days:
Prior Verbal Notification: 
 
Recipient Understood Notice: Yes
Recipient Signature: Yes
Med Rec Note Co-signed by Attending:
 
Coverage Notice Comment:  
 
Last DP export: 20   4:34 p
Patient Name: RYANNE BOGGS
Encounter #: [id number]
Page [id number]
 
 
 
 
 
Electronically Signed by BRYCE HUTCHINS on 20 at 1748
 
 
 
 
 
 
**All edits/amendments must be made on the electronic document**
 
DICTATION DATE: 20     : SARWAT  20     
RPT#: [id number]                                DC DATE:20
                                               STATUS: DIS IN  
[facility]
[address] [place] [address]
***END OF REPORT***

## 2020-05-24 NOTE — MORECARE
CASE MANAGEMENT DISCHARGE SUMMARY
 
 
PATIENT: RYANNE BOGGS                   UNIT: [id number]
ACCOUNT#: [id number]                       ADM DATE: 20
AGE: 44     : 77  SEX: M            ROOM/BED: D.2106    
AUTHOR: BRYCE HUTCHINS                             PHYSICIAN:                               
 
REFERRING PHYSICIAN: JAMIE POLO MD               
DATE OF SERVICE: 20
Discharge Plan
 
 
Patient Name: RYANNE BOGGS
Facility: [facility]:[place]
Encounter #: [id number]
Medical Record #: [id number]
: 1975
Planned Disposition: Home
Anticipated Discharge Date: 
 
Discharge Date: 2020
Expected LOS: 
Initial Reviewer: [id number]
Initial Review Date: 2020
Generated: 20   6:34 pm 
 DCPIA - Discharge Planning Initial Assessment
 
Updated by [id number]: Cheryl Espinoza on 20   5:33 pm
*  Is the patient Alert and Oriented?
Yes
*  How many steps to enter\exit or inside your home?
 
*  PCP
NO PCP
*  Pharmacy
[facility]
*  Preadmission Environment
Home Alone
*  ADLs
Independent
*  Equipment
None
*  List name and contact numbers for known caregivers / representatives who 
currently or will assist patient after discharge:
KALIA WHALEY  IN[place] - [phone]
*  Verbal permission to speak to the caregivers and representatives has been 
obtained from the patient.
N/A
*  Community resources currently utilized
 
None
*  Additional services required to return to the preadmission environment?
No
*  Can the patient safely return to the preadmission environment?
Yes
*  Has this patient been hospitalized within the prior 30 days at any 
hospital?
Yes
 
 
 
 
 
Coverage Notice
 
Reviewer: [email or web address] - Cheryl Espinoza
 
Notice Issued Date-Time: 2020  13:02
Notice Type: IM Discharge Notice
 
Notice Delivered To: Patient
Relationship to Patient: Self
Representative Name: 
 
Delivery Method: HAND - Hand Delivered
Roseann Days:
Prior Verbal Notification: 
 
Recipient Understood Notice: Yes
Recipient Signature: Yes
Med Rec Note Co-signed by Attending:
 
Coverage Notice Comment:  
 
Last DP export: 20   4:27 p
Patient Name: RYANNE BOGGS
 
Encounter #: [id number]
Page [id number]
 
 
 
 
 
Electronically Signed by BRYCE HUTCHINS on 20 at 1734
 
 
 
 
 
 
**All edits/amendments must be made on the electronic document**
 
DICTATION DATE: 20 1734     : SARWAT  20 1734     
RPT#: [id number]                                DC DATE:20
                                               STATUS: DIS IN  
[facility]
 [place] [address]
***END OF REPORT***

## 2020-06-01 ENCOUNTER — HOSPITAL ENCOUNTER (INPATIENT)
Dept: HOSPITAL 84 - D.ER | Age: 45
LOS: 5 days | Discharge: HOME | DRG: 291 | End: 2020-06-06
Attending: FAMILY MEDICINE | Admitting: FAMILY MEDICINE
Payer: MEDICARE

## 2020-06-01 VITALS
BODY MASS INDEX: 27.61 KG/M2 | WEIGHT: 186.39 LBS | HEIGHT: 69 IN | HEIGHT: 69 IN | BODY MASS INDEX: 27.61 KG/M2 | WEIGHT: 186.39 LBS

## 2020-06-01 DIAGNOSIS — I42.9: ICD-10-CM

## 2020-06-01 DIAGNOSIS — I50.23: ICD-10-CM

## 2020-06-01 DIAGNOSIS — D50.9: ICD-10-CM

## 2020-06-01 DIAGNOSIS — E87.6: ICD-10-CM

## 2020-06-01 DIAGNOSIS — E11.9: ICD-10-CM

## 2020-06-01 DIAGNOSIS — F17.213: ICD-10-CM

## 2020-06-01 DIAGNOSIS — Z91.14: ICD-10-CM

## 2020-06-01 DIAGNOSIS — I34.0: ICD-10-CM

## 2020-06-01 DIAGNOSIS — I11.0: Primary | ICD-10-CM

## 2020-06-01 DIAGNOSIS — F19.10: ICD-10-CM

## 2020-06-01 DIAGNOSIS — G93.41: ICD-10-CM

## 2020-06-06 VITALS — DIASTOLIC BLOOD PRESSURE: 80 MMHG | SYSTOLIC BLOOD PRESSURE: 110 MMHG

## 2020-07-03 ENCOUNTER — HOSPITAL ENCOUNTER (INPATIENT)
Dept: HOSPITAL 84 - D.ER | Age: 45
LOS: 4 days | Discharge: HOME | DRG: 291 | End: 2020-07-07
Attending: FAMILY MEDICINE | Admitting: FAMILY MEDICINE
Payer: MEDICARE

## 2020-07-03 VITALS
BODY MASS INDEX: 28.25 KG/M2 | HEIGHT: 69 IN | WEIGHT: 190.77 LBS | HEIGHT: 69 IN | BODY MASS INDEX: 28.25 KG/M2 | WEIGHT: 190.77 LBS | BODY MASS INDEX: 28.25 KG/M2

## 2020-07-03 VITALS — DIASTOLIC BLOOD PRESSURE: 82 MMHG | SYSTOLIC BLOOD PRESSURE: 116 MMHG

## 2020-07-03 DIAGNOSIS — J44.1: ICD-10-CM

## 2020-07-03 DIAGNOSIS — J44.0: ICD-10-CM

## 2020-07-03 DIAGNOSIS — F19.10: ICD-10-CM

## 2020-07-03 DIAGNOSIS — D50.9: ICD-10-CM

## 2020-07-03 DIAGNOSIS — F31.9: ICD-10-CM

## 2020-07-03 DIAGNOSIS — E87.6: ICD-10-CM

## 2020-07-03 DIAGNOSIS — I27.20: ICD-10-CM

## 2020-07-03 DIAGNOSIS — J96.01: ICD-10-CM

## 2020-07-03 DIAGNOSIS — I50.43: ICD-10-CM

## 2020-07-03 DIAGNOSIS — I11.0: Primary | ICD-10-CM

## 2020-07-03 DIAGNOSIS — I42.9: ICD-10-CM

## 2020-07-03 DIAGNOSIS — I08.1: ICD-10-CM

## 2020-07-03 DIAGNOSIS — J18.9: ICD-10-CM

## 2020-07-03 DIAGNOSIS — E11.65: ICD-10-CM

## 2020-07-03 DIAGNOSIS — F17.203: ICD-10-CM

## 2020-07-03 LAB
ALBUMIN SERPL-MCNC: 3 G/DL (ref 3.4–5)
ALP SERPL-CCNC: 109 U/L (ref 30–120)
ALT SERPL-CCNC: 17 U/L (ref 10–68)
ANION GAP SERPL CALC-SCNC: 10.7 MMOL/L (ref 8–16)
APTT BLD: 31.6 SECONDS (ref 22.8–39.4)
BASOPHILS NFR BLD AUTO: 0.4 % (ref 0–2)
BILIRUB SERPL-MCNC: 2.56 MG/DL (ref 0.2–1.3)
BUN SERPL-MCNC: 17 MG/DL (ref 7–18)
CALCIUM SERPL-MCNC: 8.7 MG/DL (ref 8.5–10.1)
CHLORIDE SERPL-SCNC: 100 MMOL/L (ref 98–107)
CK MB SERPL-MCNC: 1.3 U/L (ref 0–3.6)
CK SERPL-CCNC: 219 UL (ref 21–232)
CO2 SERPL-SCNC: 26.9 MMOL/L (ref 21–32)
CREAT SERPL-MCNC: 1.4 MG/DL (ref 0.6–1.3)
EOSINOPHIL NFR BLD: 1 % (ref 0–7)
ERYTHROCYTE [DISTWIDTH] IN BLOOD BY AUTOMATED COUNT: 22.2 % (ref 11.5–14.5)
GLOBULIN SER-MCNC: 4.2 G/L
GLUCOSE SERPL-MCNC: 123 MG/DL (ref 74–106)
HCT VFR BLD CALC: 34.6 % (ref 42–54)
HGB BLD-MCNC: 10.6 G/DL (ref 13.5–17.5)
IMM GRANULOCYTES NFR BLD: 0.2 % (ref 0–5)
INR PPP: 1.6 (ref 0.85–1.17)
LYMPHOCYTES NFR BLD AUTO: 29.8 % (ref 15–50)
MAGNESIUM SERPL-MCNC: 2.3 MG/DL (ref 1.8–2.4)
MCH RBC QN AUTO: 21.1 PG (ref 26–34)
MCHC RBC AUTO-ENTMCNC: 30.6 G/DL (ref 31–37)
MCV RBC: 68.8 FL (ref 80–100)
MONOCYTES NFR BLD: 12.5 % (ref 2–11)
NEUTROPHILS NFR BLD AUTO: 56.1 % (ref 40–80)
NT-PROBNP SERPL-MCNC: 6253 PG/ML (ref 0–125)
OSMOLALITY SERPL CALC.SUM OF ELEC: 272 MOSM/KG (ref 275–300)
PLATELET # BLD: 225 10X3/UL (ref 130–400)
POTASSIUM SERPL-SCNC: 2.6 MMOL/L (ref 3.5–5.1)
PROT SERPL-MCNC: 7.2 G/DL (ref 6.4–8.2)
PROTHROMBIN TIME: 18.8 SECONDS (ref 11.6–15)
RBC # BLD AUTO: 5.03 10X6/UL (ref 4.2–6.1)
SODIUM SERPL-SCNC: 135 MMOL/L (ref 136–145)
TROPONIN I SERPL-MCNC: 0.02 NG/ML (ref 0–0.06)
WBC # BLD AUTO: 5.3 10X3/UL (ref 4.8–10.8)

## 2020-07-03 NOTE — NUR
ADMIT TO ROOM 2126 FROM ER. ALERT/ORIENTED. ARRIVED WITH IV POTASSIUM RIDER
INFUSING TO LEFT A/C. PT IMMEDIATELY C/O BURNING PAIN TO IV SITE. STARTED IV
NS TO DILUTE POTASSIUM RIDER AND ALSO SIGNIFICANTLY SLOWED DOWN RIDER.
 
ADMISSION HISTORY AND ASSESSMENT INITIATED. PT PROVIDED WITH SOUP AND CRACKERS
AND DRINK DUE TO HIS BEING HUNGRY.
 
PT TEACHING ON ALL URINE MUST BE MEASURED SINCE HE WOULD BE ON A BUMEX DRIP
SHORTLY. URINAL PROVIDED.
 
PT UNABLE TO PROVIDE ANY LIST OF HOME MEDS AND STATES HE HAS NOT BEEN TAKING
ANYTHING FOR A WHILE.

## 2020-07-04 VITALS — SYSTOLIC BLOOD PRESSURE: 112 MMHG | DIASTOLIC BLOOD PRESSURE: 92 MMHG

## 2020-07-04 VITALS — SYSTOLIC BLOOD PRESSURE: 92 MMHG | DIASTOLIC BLOOD PRESSURE: 61 MMHG

## 2020-07-04 VITALS — SYSTOLIC BLOOD PRESSURE: 119 MMHG | DIASTOLIC BLOOD PRESSURE: 97 MMHG

## 2020-07-04 VITALS — SYSTOLIC BLOOD PRESSURE: 106 MMHG | DIASTOLIC BLOOD PRESSURE: 66 MMHG

## 2020-07-04 VITALS — DIASTOLIC BLOOD PRESSURE: 68 MMHG | SYSTOLIC BLOOD PRESSURE: 99 MMHG

## 2020-07-04 LAB
ANION GAP SERPL CALC-SCNC: 8.7 MMOL/L (ref 8–16)
BASOPHILS NFR BLD AUTO: 0.2 % (ref 0–2)
BILIRUB SERPL-MCNC: 2.34 MG/DL (ref 0.2–1.3)
BUN SERPL-MCNC: 16 MG/DL (ref 7–18)
CALCIUM SERPL-MCNC: 8.2 MG/DL (ref 8.5–10.1)
CHLORIDE SERPL-SCNC: 102 MMOL/L (ref 98–107)
CO2 SERPL-SCNC: 28.3 MMOL/L (ref 21–32)
CREAT SERPL-MCNC: 1.3 MG/DL (ref 0.6–1.3)
EOSINOPHIL NFR BLD: 1 % (ref 0–7)
ERYTHROCYTE [DISTWIDTH] IN BLOOD BY AUTOMATED COUNT: 22.2 % (ref 11.5–14.5)
GLUCOSE SERPL-MCNC: 109 MG/DL (ref 74–106)
HCT VFR BLD CALC: 33 % (ref 42–54)
HGB BLD-MCNC: 10.1 G/DL (ref 13.5–17.5)
IMM GRANULOCYTES NFR BLD: 0.2 % (ref 0–5)
LYMPHOCYTES NFR BLD AUTO: 27 % (ref 15–50)
MCH RBC QN AUTO: 21 PG (ref 26–34)
MCHC RBC AUTO-ENTMCNC: 30.6 G/DL (ref 31–37)
MCV RBC: 68.8 FL (ref 80–100)
MONOCYTES NFR BLD: 12.1 % (ref 2–11)
NEUTROPHILS NFR BLD AUTO: 59.5 % (ref 40–80)
OSMOLALITY SERPL CALC.SUM OF ELEC: 273 MOSM/KG (ref 275–300)
PLATELET # BLD: 166 10X3/UL (ref 130–400)
POTASSIUM SERPL-SCNC: 3 MMOL/L (ref 3.5–5.1)
RBC # BLD AUTO: 4.8 10X6/UL (ref 4.2–6.1)
SODIUM SERPL-SCNC: 136 MMOL/L (ref 136–145)
WBC # BLD AUTO: 4.9 10X3/UL (ref 4.8–10.8)

## 2020-07-04 NOTE — NUR
HAVE BEEN IN PATIENTS ROOM FOR ALMOST AN HOUR ATTEMPTING TO LET THIS NURSE
START A NEW IV. HE DECLINED SAYING JUST TO TAKE OUT THE ONE IN THE BEND OF HIS
LEFT A/C AND HE WOULD DO WITHOUT. EXPLAINED THE IMPORTANCE OF HIS ROCEPHIN AND
THE BUMEX WAS WHAT WAS PULLING OFF HIS EXCESS BODY FLUID. PT REFUSED TO TO
FSBS, SAYING HE DID NOT NEED IT. PT VERY NONCOMPLIANT AND DOES NOT FEEL THAT
ANY INTERVENTIONS ARE BEING BENEFICIAL TO HIM. ENCOURAGED COMPLIANCE. WAS ABLE
TO GET HIS ROCOPHEN INFUSED AND CURRENTLY BUMEX DRIP IS INFUSING, BUT HE IS
NOT HAPPY ABOUT IT.

## 2020-07-04 NOTE — NUR
PATIENT REFUSED SCDS, PITTING EDEMA FROM THIGH TO TOP OF FOOT. PATIENT IS NOT
VERY COMPLIANT WITH CARES.

## 2020-07-04 NOTE — NUR
PATIENT REFUSING K+ SUPPLEMENT IV AND ORAL. EDUCATED PATIENT ON SUPPLEMENTS
AND PATIENT IS BEING NONCOMPLIANT WITH CARES. ASKED PATIENT WHAT CAN WE DO TO
HELP YOU IF YOU ARE NOT GOING TO ACCEPT THE CARES THAT WE ARE OFFERING IN
HOPES TO IMPROVE YOUR OUTCOME. PATIENT STATES HE WILL TAKE THE ORAL
SUPPLEMENTS.

## 2020-07-04 NOTE — NUR
IV BUMEX DRIP WAS FOUND IN ER, NOW DELIVERED TO FLOOR AND IS UP AND INFUSING.
PT SAYING NOT ENOUGH IS BEING DONE FOR HIM AND THAT HE HOPES HE DOES NOT DIE
DURING THE NIGHT. EXPLAINED PLAN OF CARE AND INTERVENTIONS TO PATIENT. HE JUST
MUMBLED UNDER HIS BREATH THAT HE HOPES HE MAKES IT. REMINDED PT THAT HE ALSO
NEEDED TO TAKE HIS MEDICATIONS ONCE HE IS DISCHARGED HOME, BECAUSE THAT WILL
HELP HIM STAY WELL.

## 2020-07-05 VITALS — DIASTOLIC BLOOD PRESSURE: 57 MMHG | SYSTOLIC BLOOD PRESSURE: 102 MMHG

## 2020-07-05 VITALS — SYSTOLIC BLOOD PRESSURE: 93 MMHG | DIASTOLIC BLOOD PRESSURE: 65 MMHG

## 2020-07-05 VITALS — DIASTOLIC BLOOD PRESSURE: 63 MMHG | SYSTOLIC BLOOD PRESSURE: 92 MMHG

## 2020-07-05 LAB
ALBUMIN SERPL-MCNC: 2.6 G/DL (ref 3.4–5)
ALP SERPL-CCNC: 100 U/L (ref 30–120)
ALT SERPL-CCNC: 18 U/L (ref 10–68)
ANION GAP SERPL CALC-SCNC: 10.4 MMOL/L (ref 8–16)
ANISOCYTOSIS BLD QL SMEAR: (no result)
BILIRUB SERPL-MCNC: 1.5 MG/DL (ref 0.2–1.3)
BUN SERPL-MCNC: 12 MG/DL (ref 7–18)
CALCIUM SERPL-MCNC: 8.4 MG/DL (ref 8.5–10.1)
CHLORIDE SERPL-SCNC: 105 MMOL/L (ref 98–107)
CO2 SERPL-SCNC: 29.7 MMOL/L (ref 21–32)
CREAT SERPL-MCNC: 1.3 MG/DL (ref 0.6–1.3)
ERYTHROCYTE [DISTWIDTH] IN BLOOD BY AUTOMATED COUNT: 22.6 % (ref 11.5–14.5)
GLOBULIN SER-MCNC: 4 G/L
GLUCOSE SERPL-MCNC: 114 MG/DL (ref 74–106)
HCT VFR BLD CALC: 35.4 % (ref 42–54)
HGB BLD-MCNC: 10.6 G/DL (ref 13.5–17.5)
LYMPHOCYTES NFR BLD AUTO: 29 % (ref 15–50)
MAGNESIUM SERPL-MCNC: 1.7 MG/DL (ref 1.8–2.4)
MCH RBC QN AUTO: 21 PG (ref 26–34)
MCHC RBC AUTO-ENTMCNC: 29.9 G/DL (ref 31–37)
MCV RBC: 70.1 FL (ref 80–100)
MONOCYTES NFR BLD: 2 % (ref 2–11)
NEUTROPHILS NFR BLD AUTO: 66 % (ref 40–80)
OSMOLALITY SERPL CALC.SUM OF ELEC: 283 MOSM/KG (ref 275–300)
PLATELET # BLD EST: NORMAL 10*3/UL
PLATELET # BLD: 162 10X3/UL (ref 130–400)
POTASSIUM SERPL-SCNC: 3.1 MMOL/L (ref 3.5–5.1)
PROT SERPL-MCNC: 6.6 G/DL (ref 6.4–8.2)
RBC # BLD AUTO: 5.05 10X6/UL (ref 4.2–6.1)
SODIUM SERPL-SCNC: 142 MMOL/L (ref 136–145)
TARGETS BLD QL SMEAR: (no result)
WBC # BLD AUTO: 5 10X3/UL (ref 4.8–10.8)

## 2020-07-05 NOTE — NUR
RECEIVED REPORT. ASSUMED CARE OF PATIENT. BEDSIDE SHIFT REPORT COMPLETE. WHITE
BOARD UPDATED. PATIENT LYING IN BED WITH EYES CLOSED. EASILY AROUSED. PATIENT
REQUESTING IV TO BE REMOVED, NOC NURSE DENIED REQUEST. NO DISTRESS. SR ON
TELEMETRY, RATE 93.

## 2020-07-05 NOTE — NUR
ALL BEDTIME MEDS GIVEN. IV ROCEPHIN HAS INFUSED. PT STILL WANTING BUMEX AND IV
"OUT". STILL INSTRUCTING HIM ON IMPORTANCE FOR HIS DIAGNOSIS. CPOC. SR PER
TELEMETRY.

## 2020-07-05 NOTE — NUR
REPORT RECIEVED AND INITIAL ROUNDS COMPLETED. CURRENTL BUMEX AT 5ML/HR IS
INFUSING TO LEFT A/C AND PT IS ALREADY ASKING NURSE TO "TAKE IT OUT". IV IS
GOOD AND INSTRUCTED PT ON THE IMPORTANCE OF THE BUMEX TO PULL OFF EXTRA BODY
FLUID. PT TEACHING ON THE NEED FOR ACCURATE OUTPUT AND FOR A NEEDED URINE
SPECIMEN. PT JUST STARES BLANKLY AT NURSE. CPOC. CONTINUED TO EDUCATE ON
COMPLIANCE.

## 2020-07-05 NOTE — NUR
CALLED TO PATIENT ROOM. PATIENT REQUEST IV BE TAKEN OUT. PATIENT HAS TURNED IV
BUMEX OFF. PATIENT STATES HE DOES NOT THINK HE NEEDS THE IV BUMEX ANYMORE.
PATIENT ALSO ASKED AGAIN TO USE URINAL FOR URINE SPECIMEN. PATIENT ROLLS EYES
AT THIS WRITER. AT THIS TIME, IV BUMEX REMAINS OFF. IV INTACT TO LEFT AC.

## 2020-07-05 NOTE — NUR
UDS ORDERED BY TWO PROVIDERS. PATIENT WILL NOT URINATE IN THE URINAL TO
PROVIDE NURSING WITH A SAMPLE.

## 2020-07-05 NOTE — NUR
MEDICATED FOR PAIN AT THIS TIME. NO DISTRESS. ABLE TO RECONNECT PATIENT TO
[facility] AT THIS TIME.

## 2020-07-05 NOTE — NUR
ANSWERED PATIENT CALL LIGHT AND PATIENT HAD IV PUMP TURNED OFF AND STATES HE
WAS READY FOR IT TO BE TURNED BACK ON. PATIENT WOULD NOT STATE WHO TURNED THE
PUMP OFF. ALL SETTING FOUND TO BE CHANGED IN PUMP. COUNSELED PATIENT AGAIN
ABOUT TURNING OFF PUMP AND REMINDED PATIENT THAT  REFERRED TO THE
BUMEX DRIP AS HIS LIFELINE, THAT IS THE ONLY THING THAT IS HELPING HIM AND
PATIENT CONTINUES TO BE NON COMPLIANT WITH CARES. CALL LIGHT PLACED WITHIN
REACH. BUMEX DRIP INFUSING AS ORDERED AT THIS TIME. NO DISTRESS.

## 2020-07-06 VITALS — SYSTOLIC BLOOD PRESSURE: 91 MMHG | DIASTOLIC BLOOD PRESSURE: 63 MMHG

## 2020-07-06 VITALS — SYSTOLIC BLOOD PRESSURE: 141 MMHG | DIASTOLIC BLOOD PRESSURE: 119 MMHG

## 2020-07-06 VITALS — SYSTOLIC BLOOD PRESSURE: 103 MMHG | DIASTOLIC BLOOD PRESSURE: 55 MMHG

## 2020-07-06 VITALS — SYSTOLIC BLOOD PRESSURE: 99 MMHG | DIASTOLIC BLOOD PRESSURE: 69 MMHG

## 2020-07-06 LAB
ALBUMIN SERPL-MCNC: 2.5 G/DL (ref 3.4–5)
ALP SERPL-CCNC: 97 U/L (ref 30–120)
ALT SERPL-CCNC: 21 U/L (ref 10–68)
ANION GAP SERPL CALC-SCNC: 14 MMOL/L (ref 8–16)
BASOPHILS NFR BLD AUTO: 0.4 % (ref 0–2)
BILIRUB SERPL-MCNC: 1.92 MG/DL (ref 0.2–1.3)
BUN SERPL-MCNC: 21 MG/DL (ref 7–18)
CALCIUM SERPL-MCNC: 8.3 MG/DL (ref 8.5–10.1)
CHLORIDE SERPL-SCNC: 101 MMOL/L (ref 98–107)
CO2 SERPL-SCNC: 25 MMOL/L (ref 21–32)
CREAT SERPL-MCNC: 1.4 MG/DL (ref 0.6–1.3)
EOSINOPHIL NFR BLD: 1.4 % (ref 0–7)
ERYTHROCYTE [DISTWIDTH] IN BLOOD BY AUTOMATED COUNT: 22.8 % (ref 11.5–14.5)
GLOBULIN SER-MCNC: 3.8 G/L
GLUCOSE SERPL-MCNC: 139 MG/DL (ref 74–106)
HCT VFR BLD CALC: 35.6 % (ref 42–54)
HGB BLD-MCNC: 10.8 G/DL (ref 13.5–17.5)
IMM GRANULOCYTES NFR BLD: 0.2 % (ref 0–5)
LYMPHOCYTES NFR BLD AUTO: 24.3 % (ref 15–50)
MAGNESIUM SERPL-MCNC: 1.9 MG/DL (ref 1.8–2.4)
MCH RBC QN AUTO: 21.1 PG (ref 26–34)
MCHC RBC AUTO-ENTMCNC: 30.3 G/DL (ref 31–37)
MCV RBC: 69.4 FL (ref 80–100)
MONOCYTES NFR BLD: 12.7 % (ref 2–11)
NEUTROPHILS NFR BLD AUTO: 61 % (ref 40–80)
OSMOLALITY SERPL CALC.SUM OF ELEC: 272 MOSM/KG (ref 275–300)
PLATELET # BLD: 147 10X3/UL (ref 130–400)
POTASSIUM SERPL-SCNC: 6 MMOL/L (ref 3.5–5.1)
PROT SERPL-MCNC: 6.3 G/DL (ref 6.4–8.2)
RBC # BLD AUTO: 5.13 10X6/UL (ref 4.2–6.1)
SODIUM SERPL-SCNC: 134 MMOL/L (ref 136–145)
WBC # BLD AUTO: 5 10X3/UL (ref 4.8–10.8)

## 2020-07-06 NOTE — NUR
EMPTIED YANG OF 600ML DARK YELLOW URINE.  PT CLEAN/DRY. NEPRO @ 40ML/HR VIA
FEEDING PUMP TO DOBHOFF TUBE. SECURED TUBING TO NOSE. ASSESSED LEFT BKA, DRIED
STUMP INCISIONS OPEN TO AIR WITH A PAD UNDER THE STUMP. PT DOES NOT FOLLOW
OR INDICATE ANY COMPREHENSION LEVEL OF WHAT IS BEING SAID TO HIM. GUARD AT
BEDSIDE SAID PT HAS NOT SPOKEN THIS SHIFT.

## 2020-07-06 NOTE — CN
PATIENT NAME:RYANNE BOGGS                             MEDICAL RECORD: [id number]
: [birth year]                                              LOCATION:D. D.2126
ADMIT DATE: 20                                       ACCOUNT: [id number]
CONSULTING PHYSICIAN:    SAURAV JAIMES MD          
                                               
REFERRING PHYSICIAN:     ZURDO NICKERSON MD           
 
 
DATE OF CONSULTATION:  2020
 
HISTORY OF PRESENT ILLNESS:  A 44-year-old gentleman with a history of a severe
nonischemic cardiomyopathy, multiple admissions for volume overload secondary to
noncompliance, some of this related to social issues, admitted with volume
overload, typical admission with 3-4 day history of increased shortness of
breath and lower extremity edema different times, reports could not find his
medications, the appetite medications, is currently responding nicely to Bumex
drip.  We are asked to see him concerning his cardiovascular status.
 
PAST MEDICAL HISTORY:  Includes;
1.  History of hypertension.
2.  Hyperlipidemia.
3.  Cardiomyopathy.
 
ALLERGIES:  None known.
 
MEDICATIONS:  Per recent discharge, should have been Entresto , carvedilol
3.125 b.i.d., Aldactone 25 every day and digoxin 0.125 every day.
 
SOCIAL HISTORY:  Smokes about a pack a day, still continues the methamphetamine
use as well as alcohol use.
 
REVIEW OF SYSTEMS:  The patient reports easy bruising but reports no swollen
glands. The patient reports no fever, no night sweats, no significant weight
gain, no significant weight loss.  No significant exercise tolerance.  The
patient reports no dry eyes, no irritation, no vision change.  Patient reports
no difficulty hearing and no ear pain.  Patient reports no frequent nose bleeds
or nose and sinus problems.  Patient reports on arm pain on exertion.  No
shortness of breath while lying down.  No history of heart murmur.  Patient
reports no cough, no wheezing or coughing up blood.  Patient reports no
abdominal pain, no vomiting.  Normal appetite.  No diarrhea and not vomiting
blood.  No nausea and no constipation.  Patient reports no incontinence.  No
difficulty urinating.  No hematuria.  No increased frequency.  Patient reports
no muscle aches.  No weakness, no arthralgias, no back pain.  No swelling of the
extremities.  Patient reports no abnormal mole, no jaundice, no rashes.  Reports
no loss of consciousness.  No weakness and no numbness.  No seizures, dizziness,
or headaches.  The patient reports no depression, no sleep disturbance, feeling
safe in a relationship and no alcohol abuse.  Patient reports on fatigue. 
Reports no runny nose or sinus pressure.  No itching, no hives, and no frequent
sneezing.
 
PHYSICAL EXAMINATION:
GENERAL:  Pleasant.  No acute distress.
VITAL SIGNS:  Blood pressure 99/68, pulse 90 and regular.
HEENT:  Normocephalic.
NECK:  JVD to the angle of the jaw.
HEART:  Regular.  S3 gallop is noted.
LUNGS:  Diminished breath sounds.
ABDOMEN:  Soft, nontender.
 
 
 
CONSULT REPORT                                 [id number]    RYANNE BOGGS           
 
 
EXTREMITIES:  Pulses were 2+, 2+ edema.
NEUROLOGIC:  Grossly intact.
 
IMPRESSION:  Volume overload secondary to noncompliance.  Responding nicely to
the Bumex drip.  Reiterated compliance issues with the patient.  We will work
with getting medications if needed.  Further recommendations based on the above.
 
TRANSINT:[id number] Voice Confirmation ID: [id number] DOCUMENT ID: [id number]
                                           
                                           SAURAV JAIMES MD          
 
 
 
Electronically Signed by SAURAV JAIMES on 20 at 1122
 
 
 
 
 
 
 
 
 
 
 
 
 
 
 
 
 
 
 
 
 
 
 
 
 
 
 
 
 
 
 
 
 
CC:                                                             [id number]
DICTATION DATE: 20 1027     :     20 1614      ADM IN  
                                                                              
[address] [place] [address]

## 2020-07-06 NOTE — NUR
DR ROONEY SPOKE WITH PATIENT ABOUT HIS NONCOMPLIANCE. PT AGREED TO HAVE IV
RESITED. SITED 22G X 1 ATTEMPT TO RFA AND RESTARTED BUMEX AT 5M/HR. PT NOW
RESTING IN BED. CPOC.

## 2020-07-06 NOTE — NUR
PT HAS PULLED OUT HIS PIV TO LEFT A/C AND BUMEX IS INFUSING ONTO THE FLOOR. HE
ALSO HAS TAKEN OFF HIS TELEMETRY BECAUSE HE SAID IT "DOESN'T MATTER".
DEMANDING TO GO OUTSIDE TO GET FRESH AIR. CURRENTLY IT IS DARK AND THE ONLY
DOOR OPEN TO HOSPITAL IS BEING MANNED FOR CHECK INS. PT DECLINED TO HAVE IV
RESITED. STATES IT WAS'NT DOING HIM ANY GOOD. REFUSES TO ALLOW TELEMETRY TO BE
PLACED BACK ON. PT NONCOMPLIANT IN EVERY ASPECT.

## 2020-07-06 NOTE — NUR
ALERT AND ORIENTED X4. UP AMBULATING IN SNELL. ENCOURAGE NOT TO LEAVE UNIT.
PERSISTANCE TO GO OUTSIDE. ENCOURAGE TO GO BACK TO ROOM. PATIENT THEN LEAVES
UNIT.

## 2020-07-06 NOTE — NUR
REPORT RECIEVED AND INITIAL ROUNDS COMPLETED. PT COMPLETELY DRESSED AND
WANDERING AROUND HIS ROOM. NO IV, NOT FOLLOWING PLAN OF CARE. CAN PROVIDE NO
CREDIBLE REASON FOR NOT BEING COOPERATIVE OTHER THAN HE DOESN'T FEEL LIKE IT.

## 2020-07-07 VITALS — DIASTOLIC BLOOD PRESSURE: 53 MMHG | SYSTOLIC BLOOD PRESSURE: 86 MMHG

## 2020-07-07 VITALS — DIASTOLIC BLOOD PRESSURE: 74 MMHG | SYSTOLIC BLOOD PRESSURE: 102 MMHG

## 2020-07-07 LAB
ALBUMIN SERPL-MCNC: 2.6 G/DL (ref 3.4–5)
ALP SERPL-CCNC: 100 U/L (ref 30–120)
ALT SERPL-CCNC: 22 U/L (ref 10–68)
AMPHETAMINES UR QL SCN: NEGATIVE QUAL
ANION GAP SERPL CALC-SCNC: 14.2 MMOL/L (ref 8–16)
BARBITURATES UR QL SCN: NEGATIVE QUAL
BENZODIAZ UR QL SCN: NEGATIVE QUAL
BILIRUB SERPL-MCNC: 1.47 MG/DL (ref 0.2–1.3)
BILIRUB SERPL-MCNC: NEGATIVE MG/DL
BUN SERPL-MCNC: 22 MG/DL (ref 7–18)
BZE UR QL SCN: NEGATIVE QUAL
CALCIUM SERPL-MCNC: 8.3 MG/DL (ref 8.5–10.1)
CANNABINOIDS UR QL SCN: NEGATIVE QUAL
CHLORIDE SERPL-SCNC: 101 MMOL/L (ref 98–107)
CO2 SERPL-SCNC: 24.5 MMOL/L (ref 21–32)
CREAT SERPL-MCNC: 1.5 MG/DL (ref 0.6–1.3)
EOSINOPHIL NFR BLD: 3 % (ref 0–7)
ERYTHROCYTE [DISTWIDTH] IN BLOOD BY AUTOMATED COUNT: 22.2 % (ref 11.5–14.5)
GLOBULIN SER-MCNC: 3.8 G/L
GLUCOSE SERPL-MCNC: 132 MG/DL (ref 74–106)
GLUCOSE SERPL-MCNC: NEGATIVE MG/DL
HCT VFR BLD CALC: 33.6 % (ref 42–54)
HGB BLD-MCNC: 10.4 G/DL (ref 13.5–17.5)
KETONES UR STRIP-MCNC: NEGATIVE MG/DL
LYMPHOCYTES NFR BLD AUTO: 29 % (ref 15–50)
MAGNESIUM SERPL-MCNC: 1.8 MG/DL (ref 1.8–2.4)
MCH RBC QN AUTO: 21.1 PG (ref 26–34)
MCHC RBC AUTO-ENTMCNC: 31 G/DL (ref 31–37)
MCV RBC: 68.3 FL (ref 80–100)
MONOCYTES NFR BLD: 8 % (ref 2–11)
NEUTROPHILS NFR BLD AUTO: 60 % (ref 40–80)
NITRITE UR-MCNC: NEGATIVE MG/ML
OPIATES UR QL SCN: NEGATIVE QUAL
OSMOLALITY SERPL CALC.SUM OF ELEC: 276 MOSM/KG (ref 275–300)
PCP UR QL SCN: NEGATIVE QUAL
PH UR STRIP: 8 [PH] (ref 5–6)
PLATELET # BLD EST: NORMAL 10*3/UL
PLATELET # BLD: 165 10X3/UL (ref 130–400)
POTASSIUM SERPL-SCNC: 3.7 MMOL/L (ref 3.5–5.1)
PROT SERPL-MCNC: 6.4 G/DL (ref 6.4–8.2)
RBC # BLD AUTO: 4.92 10X6/UL (ref 4.2–6.1)
SODIUM SERPL-SCNC: 136 MMOL/L (ref 136–145)
SP GR UR STRIP: 1 (ref 1–1.02)
UROBILINOGEN UR-MCNC: NORMAL MG/DL
WBC # BLD AUTO: 4.8 10X3/UL (ref 4.8–10.8)

## 2020-07-07 NOTE — NUR
PT RESTING WITH NO DISTRESS. NO TELEMETRY PER PT REFUSAL. NEW IV PATENT AND
BUMEX IS INFUSING. PT HAS BEEN VOIDING TO URINAL AS DIRECTED. WHEN HE
UNDERSTOOD WHAT A "CONDOM CATH" WAS, HE SUDDENLY DECIDED THAT HE WOULD VOID TO
URINAL , BUT TRUE COMPLIANCE IS QUESTIONABLE. HE HAS ASKED FOR VARIOUS SNACKS
AND DRINKS THROUGH THE NIGHT AND ALL HAVE BEEN PROVIDED. CPOC.

## 2020-07-07 NOTE — NUR
RECIEVE REPORT. RESTING IN BED WITH EYES CLOSED. REFUSES TELEMETRY. RT FA
BUMEX DRIP INFUSING AS ORDERED. NO SIGNS OF DISTRESS. CONTINUE PLAN OF CARE
AND SAFETY PRECAUTIONS.

## 2020-07-07 NOTE — NUR
Nutrition Follow-up:
Good/fair PO intake. Ate ~75% of breakfast this AM.
Diet: Cardiac
PO intake: 63% avg x last 4 meals (%)
Last recorded BM: 7/6
Labs noted: Glu 132, Ca 8.3, Alb 2.6, POC Glu 249
Meds noted: Protonix, electrolyte protocol
-May consider cardiac carb consistent diet 2/2 elevated Glu.
-Encourage PO intake and honor food preferences within diet restrictions.
-Monitor wt; noted daily wts ordered.
-RD following.

## 2020-07-07 NOTE — NUR
PATIENT RECEIVED D/C INSTRUCTIONS. IV IN RIGHT FOREARM HAS BEEN REMOVED.
PATIENT DENIES HAVING ANY QUESTIONS. PATIENT ESCORTED OFF FLOOR VIA WHEELCHAIR
BY CNA.

## 2020-08-03 ENCOUNTER — HOSPITAL ENCOUNTER (INPATIENT)
Dept: HOSPITAL 84 - D.ER | Age: 45
LOS: 2 days | Discharge: LEFT BEFORE DISCHARGE | DRG: 291 | End: 2020-08-05
Attending: FAMILY MEDICINE | Admitting: FAMILY MEDICINE
Payer: MEDICARE

## 2020-08-03 VITALS
BODY MASS INDEX: 26.66 KG/M2 | HEIGHT: 69 IN | BODY MASS INDEX: 26.66 KG/M2 | WEIGHT: 180 LBS | HEIGHT: 69 IN | WEIGHT: 180 LBS | BODY MASS INDEX: 26.66 KG/M2

## 2020-08-03 VITALS — SYSTOLIC BLOOD PRESSURE: 133 MMHG | DIASTOLIC BLOOD PRESSURE: 92 MMHG

## 2020-08-03 VITALS — SYSTOLIC BLOOD PRESSURE: 142 MMHG | DIASTOLIC BLOOD PRESSURE: 94 MMHG

## 2020-08-03 VITALS — DIASTOLIC BLOOD PRESSURE: 86 MMHG | SYSTOLIC BLOOD PRESSURE: 133 MMHG

## 2020-08-03 VITALS — SYSTOLIC BLOOD PRESSURE: 111 MMHG | DIASTOLIC BLOOD PRESSURE: 83 MMHG

## 2020-08-03 DIAGNOSIS — F31.9: ICD-10-CM

## 2020-08-03 DIAGNOSIS — F19.10: ICD-10-CM

## 2020-08-03 DIAGNOSIS — I11.0: Primary | ICD-10-CM

## 2020-08-03 DIAGNOSIS — J98.11: ICD-10-CM

## 2020-08-03 DIAGNOSIS — J18.9: ICD-10-CM

## 2020-08-03 DIAGNOSIS — E11.65: ICD-10-CM

## 2020-08-03 DIAGNOSIS — I42.9: ICD-10-CM

## 2020-08-03 DIAGNOSIS — R18.8: ICD-10-CM

## 2020-08-03 DIAGNOSIS — E87.1: ICD-10-CM

## 2020-08-03 DIAGNOSIS — D50.9: ICD-10-CM

## 2020-08-03 DIAGNOSIS — I50.23: ICD-10-CM

## 2020-08-03 DIAGNOSIS — Z91.14: ICD-10-CM

## 2020-08-03 DIAGNOSIS — F17.203: ICD-10-CM

## 2020-08-03 DIAGNOSIS — E87.6: ICD-10-CM

## 2020-08-03 DIAGNOSIS — R00.8: ICD-10-CM

## 2020-08-03 LAB
ALBUMIN SERPL-MCNC: 2.8 G/DL (ref 3.4–5)
ALP SERPL-CCNC: 117 U/L (ref 30–120)
ALT SERPL-CCNC: 12 U/L (ref 10–68)
ANION GAP SERPL CALC-SCNC: 10.3 MMOL/L (ref 8–16)
APTT BLD: 35.9 SECONDS (ref 22.8–39.4)
BASOPHILS NFR BLD AUTO: 0.4 % (ref 0–2)
BILIRUB SERPL-MCNC: 3.12 MG/DL (ref 0.2–1.3)
BUN SERPL-MCNC: 8 MG/DL (ref 7–18)
CALCIUM SERPL-MCNC: 8.5 MG/DL (ref 8.5–10.1)
CHLORIDE SERPL-SCNC: 99 MMOL/L (ref 98–107)
CO2 SERPL-SCNC: 26.4 MMOL/L (ref 21–32)
CREAT SERPL-MCNC: 1.1 MG/DL (ref 0.6–1.3)
CRP SERPL-MCNC: 4.8 MG/DL (ref 0–0.9)
D DIMER PPP FEU-MCNC: 1.74 UG/MLFEU (ref 0.2–0.54)
EOSINOPHIL NFR BLD: 1.1 % (ref 0–7)
ERYTHROCYTE [DISTWIDTH] IN BLOOD BY AUTOMATED COUNT: 22.6 % (ref 11.5–14.5)
GLOBULIN SER-MCNC: 4.4 G/L
GLUCOSE SERPL-MCNC: 105 MG/DL (ref 74–106)
HCT VFR BLD CALC: 33.8 % (ref 42–54)
HGB BLD-MCNC: 10.5 G/DL (ref 13.5–17.5)
IMM GRANULOCYTES NFR BLD: 0.2 % (ref 0–5)
INR PPP: 1.39 (ref 0.85–1.17)
LIPASE SERPL-CCNC: 56 U/L (ref 73–393)
LYMPHOCYTES NFR BLD AUTO: 23 % (ref 15–50)
MAGNESIUM SERPL-MCNC: 1.8 MG/DL (ref 1.8–2.4)
MCH RBC QN AUTO: 21.2 PG (ref 26–34)
MCHC RBC AUTO-ENTMCNC: 31.1 G/DL (ref 31–37)
MCV RBC: 68.3 FL (ref 80–100)
MONOCYTES NFR BLD: 13.5 % (ref 2–11)
NEUTROPHILS NFR BLD AUTO: 61.8 % (ref 40–80)
NT-PROBNP SERPL-MCNC: 5056 PG/ML (ref 0–125)
OSMOLALITY SERPL CALC.SUM OF ELEC: 263 MOSM/KG (ref 275–300)
PLATELET # BLD: 227 10X3/UL (ref 130–400)
POTASSIUM SERPL-SCNC: 2.7 MMOL/L (ref 3.5–5.1)
PROT SERPL-MCNC: 7.2 G/DL (ref 6.4–8.2)
PROTHROMBIN TIME: 16.9 SECONDS (ref 11.6–15)
RBC # BLD AUTO: 4.95 10X6/UL (ref 4.2–6.1)
SODIUM SERPL-SCNC: 133 MMOL/L (ref 136–145)
TROPONIN I SERPL-MCNC: < 0.017 NG/ML (ref 0–0.06)
TSH SERPL-ACNC: 2.07 UIU/ML (ref 0.36–3.74)
WBC # BLD AUTO: 5.4 10X3/UL (ref 4.8–10.8)

## 2020-08-04 VITALS — SYSTOLIC BLOOD PRESSURE: 116 MMHG | DIASTOLIC BLOOD PRESSURE: 71 MMHG

## 2020-08-04 VITALS — DIASTOLIC BLOOD PRESSURE: 69 MMHG | SYSTOLIC BLOOD PRESSURE: 115 MMHG

## 2020-08-04 VITALS — SYSTOLIC BLOOD PRESSURE: 124 MMHG | DIASTOLIC BLOOD PRESSURE: 81 MMHG

## 2020-08-04 VITALS — DIASTOLIC BLOOD PRESSURE: 82 MMHG | SYSTOLIC BLOOD PRESSURE: 115 MMHG

## 2020-08-04 VITALS — SYSTOLIC BLOOD PRESSURE: 124 MMHG | DIASTOLIC BLOOD PRESSURE: 72 MMHG

## 2020-08-04 LAB
ALBUMIN SERPL-MCNC: 2.7 G/DL (ref 3.4–5)
ALP SERPL-CCNC: 113 U/L (ref 30–120)
ALT SERPL-CCNC: 17 U/L (ref 10–68)
AMPHETAMINES UR QL SCN: NEGATIVE QUAL
ANION GAP SERPL CALC-SCNC: 12.1 MMOL/L (ref 8–16)
BARBITURATES UR QL SCN: NEGATIVE QUAL
BASOPHILS NFR BLD AUTO: 0.4 % (ref 0–2)
BENZODIAZ UR QL SCN: NEGATIVE QUAL
BILIRUB SERPL-MCNC: 1.73 MG/DL (ref 0.2–1.3)
BILIRUB SERPL-MCNC: NEGATIVE MG/DL
BUN SERPL-MCNC: 8 MG/DL (ref 7–18)
BZE UR QL SCN: NEGATIVE QUAL
CALCIUM SERPL-MCNC: 8.4 MG/DL (ref 8.5–10.1)
CANNABINOIDS UR QL SCN: NEGATIVE QUAL
CHLORIDE SERPL-SCNC: 104 MMOL/L (ref 98–107)
CO2 SERPL-SCNC: 27.2 MMOL/L (ref 21–32)
CREAT SERPL-MCNC: 1.1 MG/DL (ref 0.6–1.3)
EOSINOPHIL NFR BLD: 1.6 % (ref 0–7)
ERYTHROCYTE [DISTWIDTH] IN BLOOD BY AUTOMATED COUNT: 22.9 % (ref 11.5–14.5)
GLOBULIN SER-MCNC: 3.9 G/L
GLUCOSE SERPL-MCNC: 126 MG/DL (ref 74–106)
GLUCOSE SERPL-MCNC: NEGATIVE MG/DL
HCT VFR BLD CALC: 34.5 % (ref 42–54)
HGB BLD-MCNC: 10.6 G/DL (ref 13.5–17.5)
IMM GRANULOCYTES NFR BLD: 0.2 % (ref 0–5)
KETONES UR STRIP-MCNC: NEGATIVE MG/DL
LYMPHOCYTES NFR BLD AUTO: 21.9 % (ref 15–50)
MCH RBC QN AUTO: 21.5 PG (ref 26–34)
MCHC RBC AUTO-ENTMCNC: 30.7 G/DL (ref 31–37)
MCV RBC: 69.8 FL (ref 80–100)
MONOCYTES NFR BLD: 15.4 % (ref 2–11)
NEUTROPHILS NFR BLD AUTO: 60.5 % (ref 40–80)
NITRITE UR-MCNC: NEGATIVE MG/ML
NT-PROBNP SERPL-MCNC: 4010 PG/ML (ref 0–125)
OPIATES UR QL SCN: NEGATIVE QUAL
OSMOLALITY SERPL CALC.SUM OF ELEC: 278 MOSM/KG (ref 275–300)
PCP UR QL SCN: NEGATIVE QUAL
PH UR STRIP: 7 [PH] (ref 5–6)
PLATELET # BLD: 227 10X3/UL (ref 130–400)
POTASSIUM SERPL-SCNC: 3.3 MMOL/L (ref 3.5–5.1)
PROT SERPL-MCNC: 6.6 G/DL (ref 6.4–8.2)
RBC # BLD AUTO: 4.94 10X6/UL (ref 4.2–6.1)
SODIUM SERPL-SCNC: 140 MMOL/L (ref 136–145)
UROBILINOGEN UR-MCNC: NORMAL MG/DL
WBC # BLD AUTO: 5.5 10X3/UL (ref 4.8–10.8)

## 2020-08-04 NOTE — NUR
RECIEVED TO ROOM 2136 FROM ER VIA STRETCHER.  PT A&O.  PT SOB, O2 AT 2 LITERS
VIA NC.  SATS 96%.  IV TO RIGHT ARM SL, IV SITE CLEAN AND DRY.  PLACED ON
TELEMETRY, 102 ST.  HISTORY AND MED REC OBTAINED.  PT DENIES PAIN OR NEEDS,
BED LOW, CL IN REACH.

## 2020-08-04 NOTE — NUR
REPORT RECEIVED, WILL CONTINUE POC. PATIENT IS AAOX4, UP AD PORFIRIO. NO S/S OF
DISTRESS OBSERVED, RR EVEN AND UNLABORED ON 2L O2 VIA NC. PATIENT IS BEING
MOVED TO ROOM 2104 DUE TO NEGATIVE COVID RESULT. PATIENT DENIES NEEDS AT THIS
TIME. CL IN REACH, BED LOCKED AND LOWERED. WILL CTM.

## 2020-08-05 VITALS — SYSTOLIC BLOOD PRESSURE: 123 MMHG | DIASTOLIC BLOOD PRESSURE: 87 MMHG

## 2020-08-05 VITALS — DIASTOLIC BLOOD PRESSURE: 75 MMHG | SYSTOLIC BLOOD PRESSURE: 109 MMHG

## 2020-08-05 VITALS — SYSTOLIC BLOOD PRESSURE: 143 MMHG | DIASTOLIC BLOOD PRESSURE: 89 MMHG

## 2020-08-05 VITALS — SYSTOLIC BLOOD PRESSURE: 113 MMHG | DIASTOLIC BLOOD PRESSURE: 85 MMHG

## 2020-08-05 LAB
ALBUMIN SERPL-MCNC: 2.7 G/DL (ref 3.4–5)
ALP SERPL-CCNC: 110 U/L (ref 30–120)
ALT SERPL-CCNC: 16 U/L (ref 10–68)
ANION GAP SERPL CALC-SCNC: 10.7 MMOL/L (ref 8–16)
BILIRUB SERPL-MCNC: 1.5 MG/DL (ref 0.2–1.3)
BUN SERPL-MCNC: 9 MG/DL (ref 7–18)
CALCIUM SERPL-MCNC: 8.6 MG/DL (ref 8.5–10.1)
CHLORIDE SERPL-SCNC: 104 MMOL/L (ref 98–107)
CO2 SERPL-SCNC: 28.1 MMOL/L (ref 21–32)
CREAT SERPL-MCNC: 1.1 MG/DL (ref 0.6–1.3)
ERYTHROCYTE [DISTWIDTH] IN BLOOD BY AUTOMATED COUNT: 22.7 % (ref 11.5–14.5)
GLOBULIN SER-MCNC: 4.1 G/L
GLUCOSE SERPL-MCNC: 98 MG/DL (ref 74–106)
HCT VFR BLD CALC: 33.3 % (ref 42–54)
HGB BLD-MCNC: 10.1 G/DL (ref 13.5–17.5)
LYMPHOCYTES NFR BLD AUTO: 11 % (ref 15–50)
MAGNESIUM SERPL-MCNC: 1.9 MG/DL (ref 1.8–2.4)
MCH RBC QN AUTO: 20.9 PG (ref 26–34)
MCHC RBC AUTO-ENTMCNC: 30.3 G/DL (ref 31–37)
MCV RBC: 68.8 FL (ref 80–100)
NEUTROPHILS NFR BLD AUTO: 89 % (ref 40–80)
OSMOLALITY SERPL CALC.SUM OF ELEC: 277 MOSM/KG (ref 275–300)
PLATELET # BLD EST: NORMAL 10*3/UL
PLATELET # BLD: 217 10X3/UL (ref 130–400)
POTASSIUM SERPL-SCNC: 2.8 MMOL/L (ref 3.5–5.1)
PROT SERPL-MCNC: 6.8 G/DL (ref 6.4–8.2)
RBC # BLD AUTO: 4.84 10X6/UL (ref 4.2–6.1)
SODIUM SERPL-SCNC: 140 MMOL/L (ref 136–145)
WBC # BLD AUTO: 5 10X3/UL (ref 4.8–10.8)

## 2020-08-05 NOTE — NUR
PT STATES HE WANTS TO LEAVE AMA BECAUSE HE DIDNT GET HIS PACKAGE THAT SOMEONE
BROUGHT HIM YESTERDAY.  NOTIFIED AND BR[employer]GET[employer] HOUSE SUP NOTIFIED AS
WELL. PT SIGNED AMA PAPERS. PIV REMOVED,CATH TIP FULLY INTACT. TELEMETRY
REMOVED AND RETURNED TO MONITOR TECH. ALL VALUBLES REMOVED FROM AT THIS TIME.

## 2020-08-05 NOTE — NUR
PATIENT SEEN OUTSIDE SMOKING AGAIN. PATIENT WAS INSTRUCTED BY PRIMARY NURSE
RACHEL SUE TO NOT LEAVE THE FLOOR AND DID ANYWAY. SHRUTHI BABCOCK RN UNIT MANAGER
MADE AWARE OF THIS AND TALKED TO PATIENT ABOUT NOT LEAVING THE FLOOR.

## 2020-08-07 NOTE — MORECARE
CASE MANAGEMENT DISCHARGE SUMMARY
 
 
PATIENT: RYANNE BOGGS                   UNIT: [id number]
ACCOUNT#: [id number]                       ADM DATE: 20
AGE: 44     : 77  SEX: M            ROOM/BED: D.2104    
AUTHOR: BRYCE HUTCHINS                             PHYSICIAN:                               
 
REFERRING PHYSICIAN: SAURAV ROONEY MD           
DATE OF SERVICE: 20
Discharge Plan
 
 
Patient Name: RYANNE BOGGS
Facility: [facility]:[place]
Encounter #: [id number]
Medical Record #: [id number]
: 1975
Planned Disposition: Home
Anticipated Discharge Date: 20
 
Discharge Date: 2020
Expected LOS: 2
Initial Reviewer: [email or web address]
Initial Review Date: 2020
Generated: 20  10:25 am 
  
 
 
 
 
 
 
 
Patient Name: RYANNE BOGGS
 
Encounter #: [id number]
Page [phone]
 
 
 
 
 
Electronically Signed by BRYCE HUTCHINS on 20 at 0926
 
 
 
 
 
 
**All edits/amendments must be made on the electronic document**
 
DICTATION DATE: 20     : SARWAT  20     
RPT#: [id number]                                DC DATE:20
                                               STATUS: DIS IN  
[facility]
[address] [place] [address]
***END OF REPORT***

## 2020-08-27 ENCOUNTER — HOSPITAL ENCOUNTER (INPATIENT)
Dept: HOSPITAL 84 - D.ER | Age: 45
LOS: 3 days | Discharge: HOME | DRG: 292 | End: 2020-08-30
Attending: FAMILY MEDICINE | Admitting: FAMILY MEDICINE
Payer: MEDICARE

## 2020-08-27 VITALS
WEIGHT: 201.86 LBS | HEIGHT: 69 IN | BODY MASS INDEX: 29.9 KG/M2 | BODY MASS INDEX: 29.9 KG/M2 | HEIGHT: 69 IN | WEIGHT: 201.86 LBS

## 2020-08-27 DIAGNOSIS — I42.9: ICD-10-CM

## 2020-08-27 DIAGNOSIS — D50.9: ICD-10-CM

## 2020-08-27 DIAGNOSIS — I50.43: ICD-10-CM

## 2020-08-27 DIAGNOSIS — E80.6: ICD-10-CM

## 2020-08-27 DIAGNOSIS — E11.9: ICD-10-CM

## 2020-08-27 DIAGNOSIS — E72.20: ICD-10-CM

## 2020-08-27 DIAGNOSIS — I11.0: Primary | ICD-10-CM

## 2020-08-27 LAB
ALBUMIN SERPL-MCNC: 3.1 G/DL (ref 3.4–5)
ALP SERPL-CCNC: 137 U/L (ref 30–120)
ALT SERPL-CCNC: 20 U/L (ref 10–68)
ANION GAP SERPL CALC-SCNC: 11.1 MMOL/L (ref 8–16)
APTT BLD: 34.8 SECONDS (ref 22.8–39.4)
BASOPHILS NFR BLD AUTO: 0.8 % (ref 0–2)
BILIRUB SERPL-MCNC: 3.95 MG/DL (ref 0.2–1.3)
BUN SERPL-MCNC: 12 MG/DL (ref 7–18)
CALCIUM SERPL-MCNC: 8.8 MG/DL (ref 8.5–10.1)
CHLORIDE SERPL-SCNC: 101 MMOL/L (ref 98–107)
CK MB SERPL-MCNC: 1.5 U/L (ref 0–3.6)
CK SERPL-CCNC: 287 UL (ref 21–232)
CO2 SERPL-SCNC: 30.3 MMOL/L (ref 21–32)
CREAT SERPL-MCNC: 1 MG/DL (ref 0.6–1.3)
CRP SERPL-MCNC: 3.2 MG/DL (ref 0–0.9)
D DIMER PPP FEU-MCNC: 2.47 UG/MLFEU (ref 0.2–0.54)
EOSINOPHIL NFR BLD: 1.9 % (ref 0–7)
ERYTHROCYTE [DISTWIDTH] IN BLOOD BY AUTOMATED COUNT: 22.9 % (ref 11.5–14.5)
ETHANOL SERPL-MCNC: 0 MG/DL (ref 0–10)
GLOBULIN SER-MCNC: 4.5 G/L
GLUCOSE SERPL-MCNC: 102 MG/DL (ref 74–106)
HCT VFR BLD CALC: 36.5 % (ref 42–54)
HGB BLD-MCNC: 11.2 G/DL (ref 13.5–17.5)
IMM GRANULOCYTES NFR BLD: 0.2 % (ref 0–5)
INR PPP: 1.63 (ref 0.85–1.17)
LIPASE SERPL-CCNC: 127 U/L (ref 73–393)
LYMPHOCYTES NFR BLD AUTO: 21.6 % (ref 15–50)
MAGNESIUM SERPL-MCNC: 1.9 MG/DL (ref 1.8–2.4)
MCH RBC QN AUTO: 21.4 PG (ref 26–34)
MCHC RBC AUTO-ENTMCNC: 30.7 G/DL (ref 31–37)
MCV RBC: 69.7 FL (ref 80–100)
MONOCYTES NFR BLD: 14.8 % (ref 2–11)
NEUTROPHILS NFR BLD AUTO: 60.7 % (ref 40–80)
NT-PROBNP SERPL-MCNC: 4122 PG/ML (ref 0–125)
OSMOLALITY SERPL CALC.SUM OF ELEC: 277 MOSM/KG (ref 275–300)
PLATELET # BLD: 169 10X3/UL (ref 130–400)
POTASSIUM SERPL-SCNC: 3.4 MMOL/L (ref 3.5–5.1)
PROT SERPL-MCNC: 7.6 G/DL (ref 6.4–8.2)
PROTHROMBIN TIME: 19.2 SECONDS (ref 11.6–15)
RBC # BLD AUTO: 5.24 10X6/UL (ref 4.2–6.1)
SODIUM SERPL-SCNC: 139 MMOL/L (ref 136–145)
TROPONIN I SERPL-MCNC: 0.02 NG/ML (ref 0–0.06)
WBC # BLD AUTO: 5.2 10X3/UL (ref 4.8–10.8)

## 2020-08-27 NOTE — NUR
PATIENT TO THE ROOM. RESP DISTRESS NOTED, NASAL CANNULA IN PLACE. RT IN THE
ROOM FOR BREATHING TREATMEN.

## 2020-08-28 VITALS — SYSTOLIC BLOOD PRESSURE: 107 MMHG | DIASTOLIC BLOOD PRESSURE: 65 MMHG

## 2020-08-28 VITALS — DIASTOLIC BLOOD PRESSURE: 92 MMHG | SYSTOLIC BLOOD PRESSURE: 126 MMHG

## 2020-08-28 VITALS — DIASTOLIC BLOOD PRESSURE: 82 MMHG | SYSTOLIC BLOOD PRESSURE: 119 MMHG

## 2020-08-28 VITALS — SYSTOLIC BLOOD PRESSURE: 142 MMHG | DIASTOLIC BLOOD PRESSURE: 50 MMHG

## 2020-08-28 LAB
ALBUMIN SERPL-MCNC: 2.9 G/DL (ref 3.4–5)
ALP SERPL-CCNC: 117 U/L (ref 30–120)
ALT SERPL-CCNC: 15 U/L (ref 10–68)
AMPHETAMINES UR QL SCN: NEGATIVE QUAL
ANION GAP SERPL CALC-SCNC: 12.6 MMOL/L (ref 8–16)
BARBITURATES UR QL SCN: NEGATIVE QUAL
BASOPHILS NFR BLD AUTO: 0.3 % (ref 0–2)
BENZODIAZ UR QL SCN: NEGATIVE QUAL
BILIRUB SERPL-MCNC: 3.59 MG/DL (ref 0.2–1.3)
BILIRUB SERPL-MCNC: NEGATIVE MG/DL
BUN SERPL-MCNC: 11 MG/DL (ref 7–18)
BZE UR QL SCN: NEGATIVE QUAL
CALCIUM SERPL-MCNC: 8.3 MG/DL (ref 8.5–10.1)
CANNABINOIDS UR QL SCN: NEGATIVE QUAL
CHLORIDE SERPL-SCNC: 101 MMOL/L (ref 98–107)
CO2 SERPL-SCNC: 28.8 MMOL/L (ref 21–32)
CREAT SERPL-MCNC: 1.2 MG/DL (ref 0.6–1.3)
EOSINOPHIL NFR BLD: 0.3 % (ref 0–7)
ERYTHROCYTE [DISTWIDTH] IN BLOOD BY AUTOMATED COUNT: 22.9 % (ref 11.5–14.5)
GLOBULIN SER-MCNC: 4.1 G/L
GLUCOSE SERPL-MCNC: 109 MG/DL (ref 74–106)
HCT VFR BLD CALC: 35.4 % (ref 42–54)
HGB BLD-MCNC: 10.9 G/DL (ref 13.5–17.5)
IMM GRANULOCYTES NFR BLD: 0.2 % (ref 0–5)
KETONES UR STRIP-MCNC: NEGATIVE MG/DL
LYMPHOCYTES NFR BLD AUTO: 14 % (ref 15–50)
MAGNESIUM SERPL-MCNC: 1.7 MG/DL (ref 1.8–2.4)
MCH RBC QN AUTO: 21.2 PG (ref 26–34)
MCHC RBC AUTO-ENTMCNC: 30.8 G/DL (ref 31–37)
MCV RBC: 68.9 FL (ref 80–100)
MONOCYTES NFR BLD: 10.8 % (ref 2–11)
NEUTROPHILS NFR BLD AUTO: 74.4 % (ref 40–80)
NITRITE UR-MCNC: NEGATIVE MG/ML
OPIATES UR QL SCN: NEGATIVE QUAL
OSMOLALITY SERPL CALC.SUM OF ELEC: 278 MOSM/KG (ref 275–300)
PCP UR QL SCN: NEGATIVE QUAL
PH UR STRIP: 8 [PH] (ref 5–6)
PHOSPHATE SERPL-MCNC: 3.4 MG/DL (ref 2.5–4.9)
PLATELET # BLD: 168 10X3/UL (ref 130–400)
POTASSIUM SERPL-SCNC: 2.4 MMOL/L (ref 3.5–5.1)
PROT SERPL-MCNC: 7 G/DL (ref 6.4–8.2)
RBC # BLD AUTO: 5.14 10X6/UL (ref 4.2–6.1)
SODIUM SERPL-SCNC: 140 MMOL/L (ref 136–145)
UROBILINOGEN UR-MCNC: 1 MG/DL
WBC # BLD AUTO: 5.8 10X3/UL (ref 4.8–10.8)

## 2020-08-28 NOTE — NUR
RECIEVED UP IN BED WITH EYES OPENA ND TV ON. ALERT AND ORIETNED X4. UP AD PORFIRIO.
IV TO LT FA SL. REFUSES OXYGEN THERAPY. DENIES ANY NEEDS AT THIS TIME.

## 2020-08-29 VITALS — DIASTOLIC BLOOD PRESSURE: 86 MMHG | SYSTOLIC BLOOD PRESSURE: 110 MMHG

## 2020-08-29 VITALS — SYSTOLIC BLOOD PRESSURE: 109 MMHG | DIASTOLIC BLOOD PRESSURE: 75 MMHG

## 2020-08-29 VITALS — DIASTOLIC BLOOD PRESSURE: 82 MMHG | SYSTOLIC BLOOD PRESSURE: 130 MMHG

## 2020-08-29 VITALS — DIASTOLIC BLOOD PRESSURE: 70 MMHG | SYSTOLIC BLOOD PRESSURE: 95 MMHG

## 2020-08-29 LAB
ALBUMIN SERPL-MCNC: 2.6 G/DL (ref 3.4–5)
ALP SERPL-CCNC: 111 U/L (ref 30–120)
ALT SERPL-CCNC: 15 U/L (ref 10–68)
ANION GAP SERPL CALC-SCNC: 11.1 MMOL/L (ref 8–16)
ANION GAP SERPL CALC-SCNC: 11.8 MMOL/L (ref 8–16)
BASOPHILS NFR BLD AUTO: 0.4 % (ref 0–2)
BILIRUB SERPL-MCNC: 2.31 MG/DL (ref 0.2–1.3)
BUN SERPL-MCNC: 13 MG/DL (ref 7–18)
BUN SERPL-MCNC: 13 MG/DL (ref 7–18)
CALCIUM SERPL-MCNC: 8.4 MG/DL (ref 8.5–10.1)
CALCIUM SERPL-MCNC: 8.4 MG/DL (ref 8.5–10.1)
CHLORIDE SERPL-SCNC: 101 MMOL/L (ref 98–107)
CHLORIDE SERPL-SCNC: 101 MMOL/L (ref 98–107)
CO2 SERPL-SCNC: 29.7 MMOL/L (ref 21–32)
CO2 SERPL-SCNC: 29.9 MMOL/L (ref 21–32)
CREAT SERPL-MCNC: 1.1 MG/DL (ref 0.6–1.3)
CREAT SERPL-MCNC: 1.1 MG/DL (ref 0.6–1.3)
EOSINOPHIL NFR BLD: 2 % (ref 0–7)
ERYTHROCYTE [DISTWIDTH] IN BLOOD BY AUTOMATED COUNT: 22.6 % (ref 11.5–14.5)
GLOBULIN SER-MCNC: 4 G/L
GLUCOSE SERPL-MCNC: 112 MG/DL (ref 74–106)
GLUCOSE SERPL-MCNC: 118 MG/DL (ref 74–106)
HCT VFR BLD CALC: 32 % (ref 42–54)
HGB BLD-MCNC: 10 G/DL (ref 13.5–17.5)
IMM GRANULOCYTES NFR BLD: 0.2 % (ref 0–5)
LYMPHOCYTES NFR BLD AUTO: 22.1 % (ref 15–50)
MAGNESIUM SERPL-MCNC: 1.8 MG/DL (ref 1.8–2.4)
MCH RBC QN AUTO: 21.4 PG (ref 26–34)
MCHC RBC AUTO-ENTMCNC: 31.3 G/DL (ref 31–37)
MCV RBC: 68.4 FL (ref 80–100)
MONOCYTES NFR BLD: 13.1 % (ref 2–11)
NEUTROPHILS NFR BLD AUTO: 62.2 % (ref 40–80)
OSMOLALITY SERPL CALC.SUM OF ELEC: 278 MOSM/KG (ref 275–300)
OSMOLALITY SERPL CALC.SUM OF ELEC: 279 MOSM/KG (ref 275–300)
PHOSPHATE SERPL-MCNC: 3.6 MG/DL (ref 2.5–4.9)
PLATELET # BLD: 116 10X3/UL (ref 130–400)
POTASSIUM SERPL-SCNC: 2.7 MMOL/L (ref 3.5–5.1)
POTASSIUM SERPL-SCNC: 2.8 MMOL/L (ref 3.5–5.1)
PROT SERPL-MCNC: 6.6 G/DL (ref 6.4–8.2)
RBC # BLD AUTO: 4.68 10X6/UL (ref 4.2–6.1)
SODIUM SERPL-SCNC: 139 MMOL/L (ref 136–145)
SODIUM SERPL-SCNC: 140 MMOL/L (ref 136–145)
WBC # BLD AUTO: 5.4 10X3/UL (ref 4.8–10.8)

## 2020-08-29 NOTE — NUR
PATIENT HAD CALL LIGHT ON, UPON ENTERING ROOM AND ASKING IF I COULD HELP HIM,
STATED HE DIDN'T WANT ANYTHING FROM ME. I TOLD HIM HIS CALL LIGHT WAS ON AND
COULD I DO ANYTHING FOR HIM AND HE SAID A GLASS OF ICE. TOOK ICE TO HIM AND
WHEN ENTERING ROOM THEN HE SENT ME TO GET HIM A COKE. PATIENT HAS BEEN VERY
ARGUMENTATIVE TOTAY, REFUSING MEDICATIONS, ETC.

## 2020-08-29 NOTE — MORECARE
CASE MANAGEMENT DISCHARGE SUMMARY
 
 
PATIENT: RYANNE BOGGS                   UNIT: [id number]
ACCOUNT#: [id number]                       ADM DATE: 20
AGE: 44     : 77  SEX: M            ROOM/BED: D.2107    
AUTHOR: BRYCE HUTCHINS                             PHYSICIAN:                               
 
REFERRING PHYSICIAN: LIYAH CARRANZA MD                
DATE OF SERVICE: 20
Discharge Plan
 
 
Patient Name: RYANNE BOGGS
[facility]
Encounter #: [id number]
Medical Record #: [id number]
: 1975
Planned Disposition: Home
Anticipated Discharge Date: 
 
Discharge Date: 
Expected LOS: 
Initial Reviewer: [id number]
Initial Review Date: 2020
Generated: 20   4:54 pm 
 DCPIA - Discharge Planning Initial Assessment
 
Updated by [id number]: Caty Art on 20   3:53 pm
*  Is the patient Alert and Oriented?
Yes
*  How many steps to enter\exit or inside your home? 0/0 *  PCP [facility] med 2 *  Pharmacy
[facility]
*  Preadmission Environment
Home Alone
*  ADLs
Independent
*  Equipment
None
*  Community resources currently utilized
None
*  Additional services required to return to the preadmission environment?
Yes
*  Can the patient safely return to the preadmission environment?
Yes
*  Has this patient been hospitalized within the prior 30 days at any 
hospital?
Yes
 
 
 
 
 
 
 
Patient Name: RYANNE BOGGS
Encounter #: [id number]
Page [phone]
 
 
 
 
 
Electronically Signed by BRYCE HUTCHINS on 20 at 1555
 
 
 
 
 
 
**All edits/amendments must be made on the electronic document**
 
DICTATION DATE: 20 155     : SARWAT  20 1554     
RPT#: [id number]                                DC DATE:        
                                               STATUS: ADM IN  
[facility]
[address] [place] [address]
***END OF REPORT***

## 2020-08-29 NOTE — MORECARE
CASE MANAGEMENT DISCHARGE SUMMARY
 
 
PATIENT: RYANNE BOGGS                   UNIT: [id number]
ACCOUNT#: [id number]                       ADM DATE: 20
AGE: 44     : 77  SEX: M            ROOM/BED: D.2107    
AUTHOR: CUONG,DOC                             PHYSICIAN:                               
 
REFERRING PHYSICIAN: LIYAH CARRANZA MD                
DATE OF SERVICE: 20
Discharge Plan
 
 
Patient Name: RYANNE BOGGS
Facility: [facility]:[place]
Encounter #: [id number]
Medical Record #: [id number]
: 1975
Planned Disposition: Home
Anticipated Discharge Date: 
 
Discharge Date: 
Expected LOS: 
Initial Reviewer: [email or web address]
Initial Review Date: 2020
Generated: 20   5:28 pm 
Comments
 
DCP- Discharge Planning
 
Updated by [id number]: Caty Art on 20   3:26 pm CT
Patient Name: RYANNE BOGGS                                     
Admission Status: ER   
Accout number: [id number]                              
Admission Date: 2020   
: 1975                                                        
Admission Diagnosis:   
Attending: LIYAH CARRANZA                                                
Current LOS:  2   
  
Anticipated DC Date:    
Planned Disposition: Home   
Primary Insurance: MEDICARE A & B   
  
  
Discharge Planning Comments: CM met with patient to complete initial dc 
planning assessment.  CM educated patient on the CM role and verbal consent 
given by patient to complete assessment.  CM verified patient's address, 
phone number, and emergency contact phone numbers.  Patient lives at home 
alone and is independent. He states he lives at [facility] the address is 
 
[address] [place] [address] 18.   At discharge patient plans to return home and feels 
this is a safe discharge.  CM discussed availability of home health, rehab 
services, and medical equipment. Patient states he had a home care aide but 
fired them because they came too early for him. Pt has been on continuous 
oxygen and may require home and portable oxygen.  Pt states he has had oxygen 
in the past but they never brought it out to him.  Patient denies other known 
discharge needs at this time. Transportation provider at discharge will be 
his brother. CM will continue to follow and will assist as needed with dc 
plans/needs.  A walk test was performed for oxygen needs.  The patient was 
86% on room air.  Oxygen was applied at 2 liters and the patient recovered to 
93%.  WILNER choice signed for [facility].  CM called [facility] and updated condition.  
A tank was delivered and is at bedside.  CM will fax oxygen order and 
clinicals.  DC IMM delivered, explained, signed by the patient, and placed in 
chart. Signed form also left with the patient.  CM will continue assisting in 
DC planning.  
  
  
  
: Caty Art
 DCPIA - Discharge Planning Initial Assessment
 
Updated by [email or web address]: Caty Art on 20   3:53 pm
*  Is the patient Alert and Oriented?
Yes
*  How many steps to enter\exit or inside your home? 0/0 *  PCP [facility] med 2 *  Pharmacy
[facility]
*  Preadmission Environment
Home Alone
*  ADLs
Independent
*  Equipment
None
*  Community resources currently utilized
None
*  Additional services required to return to the preadmission environment?
Yes
*  Can the patient safely return to the preadmission environment?
Yes
*  Has this patient been hospitalized within the prior 30 days at any 
hospital?
Yes
 
External Providers
External Provider: Rogelio
 
Next Contact Date: 
Service Request Date: 
Service Type: 
Resolution: 
 
Reviewer: 
Comments: 
 
 
 
 
 
 
 
Last DP export: 20   3:02 p
Patient Name: RYANNE BOGGS
Encounter #: [id number]
Page [phone]
 
 
 
 
 
Electronically Signed by BRYCE HUTCHINS on 20 at 1628
 
 
 
 
 
 
**All edits/amendments must be made on the electronic document**
 
DICTATION DATE: 20     : SARWAT  20     
RPT#: [id number]                                DC DATE:        
                                               STATUS: ADM IN  
[facility]
[address] [place] [address]
***END OF REPORT***

## 2020-08-29 NOTE — NUR
LAB CALLED WITH CRITICAL POTASSIUM OF 2.7, TOOK ORAL POTASSIUM TO ROOM PER
ELECTROLYTE PROTOCOL AND PATIENT REFUSED AND STATED HE WOULD NOT TAKE UNLESS I
CALLED THE DR AND GOT HIM A HYDRO. EXPLAINED TO PATIENT THE IMPORTANCE OF
REPLACING THE POTASSIUM AND DANGERS OF WHAT COULD HAPPEN IF WE DIDN'T AND HE
STILL REFUSED.

## 2020-08-29 NOTE — NUR
PATIENT ON CALL LIGHT AND REQUESTING TO GO OUTSIDE AND BE WHEELED AROUND IN
WHEELCHAIR, EXPLAINED TO PATIENT I COULD NOT DO THAT TO WHICH HE ASK FOR
ANOTHER NURSE. I EXPLAINED TO HIM THAT I WOULD GET HIM ANY NURSE HE WANTED BUT
WE COULD NOT TAKE HIM OUTSIDE AND WHEEL HIM AROUND IN A WHEELCHAIR.

## 2020-08-29 NOTE — MORECARE
CASE MANAGEMENT DISCHARGE SUMMARY
 
 
PATIENT: RYANNE BOGGS                   UNIT: [id number]
ACCOUNT#: [id number]                       ADM DATE: 20
AGE: 44     : 77  SEX: M            ROOM/BED: D.5682    
AUTHOR: CUONG,DOC                             PHYSICIAN:                               
 
REFERRING PHYSICIAN: LIYAH CARRANZA MD                
DATE OF SERVICE: 20
Discharge Plan
 
 
Patient Name: RYANNE BOGGS
Facility: [facility]:[place]
Encounter #: [id number]
Medical Record #: [id number]
: 1975
Planned Disposition: Home
Anticipated Discharge Date: 
 
Discharge Date: 
Expected LOS: 
Initial Reviewer: [id number]
Initial Review Date: 2020
Generated: 20   5:02 pm 
Comments
 
DCP- Discharge Planning
 
Updated by [email or web address]: Caty Art on 20   3:00 pm CT
Patient Name: RYANNE BOGGS                                     
Admission Status: ER   
Accout number: [id number]                              
Admission Date: 2020   
: 1975                                                        
Admission Diagnosis:   
Attending: LIYAH CARRANZA                                                
Current LOS:  2   
  
Anticipated DC Date:    
Planned Disposition: Home   
Primary Insurance: MEDICARE A & B   
  
  
Discharge Planning Comments: CM met with patient to complete initial dc 
planning assessment.  CM educated patient on the CM role and verbal consent 
given by patient to complete assessment.  CM verified patient's address, 
phone number, and emergency contact phone numbers.  Patient lives at home 
alone and is independent.  At discharge patient plans to return home and 
 
feels this is a safe discharge.  CM discussed availability of home health, 
rehab services, and medical equipment. Patient states he had a home care aide 
but fired them because they came too early for him. Pt has been on continuous 
oxygen and may require home and portable oxygen.  Pt states he has had oxygen 
in the past but they never brought it out to him.  Patient denies other known 
discharge needs at this time. Transportation provider at discharge will be 
his brother. CM will continue to follow and will assist as needed with dc 
plans/needs.  A walk test was performed for oxygen needs.  The patient was 
86% on room air.  Oxygen was applied at 2 liters and the patient recovered to 
93%.  WILNER choice signed for [facility].  CM called [facility] and updated condition.  
A tank was delivered and is at bedside.  CM will fax oxygen order and 
clinicals.  DC IMM delivered, explained, signed by the patient, and placed in 
chart. Signed form also left with the patient.  CM will continue assisting in 
DC planning.  
  
  
  
: Caty Art
 DCPIA - Discharge Planning Initial Assessment
 
Updated by [email or web address]: Caty Art on 20   3:53 pm
*  Is the patient Alert and Oriented?
Yes
*  How many steps to enter\exit or inside your home? 0/0 *  PCP [facility] med 2 *  Pharmacy
[facility]
*  Preadmission Environment
Home Alone
*  ADLs
Independent
*  Equipment
None
*  Community resources currently utilized
None
*  Additional services required to return to the preadmission environment?
Yes
*  Can the patient safely return to the preadmission environment?
Yes
*  Has this patient been hospitalized within the prior 30 days at any 
hospital?
Yes
 
 
 
 
 
 
 
Last DP export: 20   2:55 p
Patient Name: RYANNE BOGGS
 
Encounter #: [id number]
Page [phone]
 
 
 
 
 
Electronically Signed by BRYCE HUTCHINS on 20 at 1602
 
 
 
 
 
 
**All edits/amendments must be made on the electronic document**
 
DICTATION DATE: 20 160     : SARWAT  20 1602     
RPT#: [id number]                                DC DATE:        
                                               STATUS: ADM IN  
[facility]
[address] [place] [address]
***END OF REPORT***

## 2020-08-29 NOTE — NUR
INITIAL ROUNDSCOMPLETED AT 1920HRS.  PTRETING WITH EYES CLOSED.  RESP EVEN AND
REGULAR.  ASSESSMENT COMPLETED AT 2035  HRS. VSS.  ST PER CM . ALERT AND
ORIENTED TO PERSON,PLACE AND TIME. CLARK.  IV TO LFA SL. LUNGS DIMINISHED IN
BASES BILAT.  K+ 2.8.  PT INITALLY REFUSED KCL BUT INFORMED PT HE COULD GO
HOME IN AM IF K+ 3.0 OR GREATER.  ALSO INFORMED DANGERS OF HAVING LOW K+. PT
TOOK KCL CRUSHED IN APPLE SAUCE.  TYLENOL 500MG PO GIVEN FOR C/O GENERALIZED
DISCOMFORT.  PT REFUSED FSBS AND PM MEDS.  PT REFUSED TELEMETRY AT 2230 HRS.
CM RETURNED TO MONITOR TECH.  REFUSES 2400 VS.  WATCHING TV AT THIS TIME. NO
DISTRESS NOTED.  CALL LIGHT WITHIN REACH.

## 2020-08-29 NOTE — NUR
PATIENT ON CALL LIGHT, ENTERED ROOM AND ASK PATIENT WHAT I COULD HELP HIM WITH
AND HE STATED TO GET THE HELL OUT OF HIS ROOM. I STATED HE HAD HIS CALL LIGHT
ON AND HE SAID HE ROLLED OVER ON IT AND IT WAS A MISTAKE.

## 2020-08-30 VITALS — DIASTOLIC BLOOD PRESSURE: 100 MMHG | SYSTOLIC BLOOD PRESSURE: 135 MMHG

## 2020-08-30 VITALS — SYSTOLIC BLOOD PRESSURE: 118 MMHG | DIASTOLIC BLOOD PRESSURE: 85 MMHG

## 2020-08-30 LAB
ALBUMIN SERPL-MCNC: 2.8 G/DL (ref 3.4–5)
ALP SERPL-CCNC: 108 U/L (ref 30–120)
ALT SERPL-CCNC: 17 U/L (ref 10–68)
ANION GAP SERPL CALC-SCNC: 11 MMOL/L (ref 8–16)
BASOPHILS NFR BLD AUTO: 0.2 % (ref 0–2)
BILIRUB SERPL-MCNC: 2.81 MG/DL (ref 0.2–1.3)
BUN SERPL-MCNC: 13 MG/DL (ref 7–18)
CALCIUM SERPL-MCNC: 8.6 MG/DL (ref 8.5–10.1)
CHLORIDE SERPL-SCNC: 101 MMOL/L (ref 98–107)
CO2 SERPL-SCNC: 28.2 MMOL/L (ref 21–32)
CREAT SERPL-MCNC: 1 MG/DL (ref 0.6–1.3)
EOSINOPHIL NFR BLD: 1.3 % (ref 0–7)
ERYTHROCYTE [DISTWIDTH] IN BLOOD BY AUTOMATED COUNT: 22.6 % (ref 11.5–14.5)
GLOBULIN SER-MCNC: 4.1 G/L
GLUCOSE SERPL-MCNC: 107 MG/DL (ref 74–106)
HCT VFR BLD CALC: 32.6 % (ref 42–54)
HGB BLD-MCNC: 10.1 G/DL (ref 13.5–17.5)
IMM GRANULOCYTES NFR BLD: 0.2 % (ref 0–5)
LYMPHOCYTES NFR BLD AUTO: 21.9 % (ref 15–50)
MAGNESIUM SERPL-MCNC: 1.7 MG/DL (ref 1.8–2.4)
MCH RBC QN AUTO: 21.3 PG (ref 26–34)
MCHC RBC AUTO-ENTMCNC: 31 G/DL (ref 31–37)
MCV RBC: 68.6 FL (ref 80–100)
MONOCYTES NFR BLD: 14.4 % (ref 2–11)
NEUTROPHILS NFR BLD AUTO: 62 % (ref 40–80)
OSMOLALITY SERPL CALC.SUM OF ELEC: 273 MOSM/KG (ref 275–300)
PHOSPHATE SERPL-MCNC: 3.3 MG/DL (ref 2.5–4.9)
PLATELET # BLD: 148 10X3/UL (ref 130–400)
POTASSIUM SERPL-SCNC: 3.2 MMOL/L (ref 3.5–5.1)
PROT SERPL-MCNC: 6.9 G/DL (ref 6.4–8.2)
RBC # BLD AUTO: 4.75 10X6/UL (ref 4.2–6.1)
SODIUM SERPL-SCNC: 137 MMOL/L (ref 136–145)
WBC # BLD AUTO: 5.2 10X3/UL (ref 4.8–10.8)

## 2020-08-30 NOTE — NUR
REVIEWED PT'S MEDICATION CLAIM HISTORY. HAD BEEN RX'S CARVEDILOL PER DR CORNEJO.
PT STATES HE HAS BEEN TAKING. DISCUSSED WITH DR JAIMES. WILL CONTINUE AT
DISCHARGE.

## 2020-08-30 NOTE — NUR
PT IN BED, HAS REFUSED FINGER STICKS PER NIGHT SHIFT NURSE AND REFUSED HIS
MEDS. PT SAYS HE WANTS TO GO HOME, PENDING POTASSIUM RESULTS. PERSONAL ITEMS
AND CALL LIGHT WITHIN REACH.

## 2020-08-31 NOTE — MORECARE
CASE MANAGEMENT DISCHARGE SUMMARY
 
 
PATIENT: RYANNE BOGGS                   UNIT: [id number]
ACCOUNT#: [id number]                       ADM DATE: 20
AGE: 44     : 77  SEX: M            ROOM/BED: D.210    
AUTHOR: CUONG,DOC                             PHYSICIAN:                               
 
REFERRING PHYSICIAN: LIYAH CARRANZA MD                
DATE OF SERVICE: 20
Discharge Plan
 
 
Patient Name: RYANNE BOGGS
Facility: [facility]:[place]
Encounter #: [id number]
Medical Record #: [id number]
: 1975
Planned Disposition: Home
Anticipated Discharge Date: 
 
Discharge Date: 2020
Expected LOS: 
Initial Reviewer: [email or web address]
Initial Review Date: 2020
Generated: 20   9:06 am 
Comments
 
DCP- Discharge Planning
 
Updated by [id number]: Caty Art on 20   3:26 pm CT
Patient Name: RYANNE BOGGS                                     
Admission Status: ER   
Accout number: [id number]                              
Admission Date: 2020   
: 1975                                                        
Admission Diagnosis:   
Attending: LIYAH CARRANZA                                                
Current LOS:  2   
  
Anticipated DC Date:    
Planned Disposition: Home   
Primary Insurance: MEDICARE A & B   
  
  
Discharge Planning Comments: CM met with patient to complete initial dc 
planning assessment.  CM educated patient on the CM role and verbal consent 
given by patient to complete assessment.  CM verified patient's address, 
phone number, and emergency contact phone numbers.  Patient lives at home 
alone and is independent. He states he lives at [facility] the address is 
 
[address] [place] [address] room 18.   At discharge patient plans to return home and feels 
this is a safe discharge.  CM discussed availability of home health, rehab 
services, and medical equipment. Patient states he had a home care aide but 
fired them because they came too early for him. Pt has been on continuous 
oxygen and may require home and portable oxygen.  Pt states he has had oxygen 
in the past but they never brought it out to him.  Patient denies other known 
discharge needs at this time. Transportation provider at discharge will be 
his brother. CM will continue to follow and will assist as needed with dc 
plans/needs.  A walk test was performed for oxygen needs.  The patient was 
86% on room air.  Oxygen was applied at 2 liters and the patient recovered to 
93%.  WILNER choice signed for [facility].  CM called [facility] and updated condition.  
A tank was delivered and is at bedside.  CM will fax oxygen order and 
clinicals.  DC IMM delivered, explained, signed by the patient, and placed in 
chart. Signed form also left with the patient.  CM will continue assisting in 
DC planning.  
  
  
  
: Caty Art
 DCPIA - Discharge Planning Initial Assessment
 
Updated by [email or web address]: Caty Art on 20   3:53 pm
*  Is the patient Alert and Oriented?
Yes
*  How many steps to enter\exit or inside your home? 0/0 *  PCP [facility] med 2 *  Pharmacy
[facility]
*  Preadmission Environment
Home Alone
*  ADLs
Independent
*  Equipment
None
*  Community resources currently utilized
None
*  Additional services required to return to the preadmission environment?
Yes
*  Can the patient safely return to the preadmission environment?
Yes
*  Has this patient been hospitalized within the prior 30 days at any 
hospital?
Yes
 
 
 
 
 
Coverage Notice
 
Reviewer: [id number] Art
 
Notice Issued Date-Time: 2020  11:50
Notice Type: IM Discharge Notice
 
 
Notice Delivered To: Patient
Relationship to Patient: 
Representative Name: 
 
Delivery Method: HAND - Hand Delivered
Roseann Days:
Prior Verbal Notification: 
 
Recipient Understood Notice: Yes
Recipient Signature: Yes
Med Rec Note Co-signed by Attending:
 
Coverage Notice Comment:  dc imm
Reviewer: [id number] Art
 
Notice Issued Date-Time: 2020  11:50
Notice Type: Patient Choice Letter
 
Notice Delivered To: Patient
Relationship to Patient: 
Representative Name: 
 
Delivery Method: HAND - Hand Delivered
Roseann Days:
Prior Verbal Notification: 
 
Recipient Understood Notice: Yes
Recipient Signature: Yes
Med Rec Note Co-signed by Attending:
 
Coverage Notice Comment:  [facility] for home and portable oxygen
 
Last DP export: 20   3:28 p
Patient Name: RYANNE BOGGS
 
Encounter #: [id number]
Page [phone]
 
 
 
 
 
Electronically Signed by BRYCE HUTCHINS on 20 at 0806
 
 
 
 
 
 
**All edits/amendments must be made on the electronic document**
 
DICTATION DATE: 20 08     : SARWAT  20 08     
RPT#: [id number]                                DC DATE:20
                                               STATUS: DIS IN  
[address] [address]
***END OF REPORT***

## 2020-10-06 ENCOUNTER — HOSPITAL ENCOUNTER (INPATIENT)
Dept: HOSPITAL 84 - D.ER | Age: 45
LOS: 1 days | Discharge: LEFT BEFORE DISCHARGE | DRG: 70 | End: 2020-10-07
Attending: FAMILY MEDICINE | Admitting: FAMILY MEDICINE
Payer: MEDICARE

## 2020-10-06 VITALS — DIASTOLIC BLOOD PRESSURE: 90 MMHG | SYSTOLIC BLOOD PRESSURE: 123 MMHG

## 2020-10-06 VITALS — SYSTOLIC BLOOD PRESSURE: 122 MMHG | DIASTOLIC BLOOD PRESSURE: 83 MMHG

## 2020-10-06 VITALS — SYSTOLIC BLOOD PRESSURE: 117 MMHG | DIASTOLIC BLOOD PRESSURE: 83 MMHG

## 2020-10-06 VITALS — BODY MASS INDEX: 32.58 KG/M2 | HEIGHT: 69 IN | BODY MASS INDEX: 32.58 KG/M2 | WEIGHT: 220 LBS

## 2020-10-06 VITALS — DIASTOLIC BLOOD PRESSURE: 76 MMHG | SYSTOLIC BLOOD PRESSURE: 97 MMHG

## 2020-10-06 VITALS — DIASTOLIC BLOOD PRESSURE: 82 MMHG | SYSTOLIC BLOOD PRESSURE: 111 MMHG

## 2020-10-06 DIAGNOSIS — F15.10: ICD-10-CM

## 2020-10-06 DIAGNOSIS — I42.9: ICD-10-CM

## 2020-10-06 DIAGNOSIS — G93.41: Primary | ICD-10-CM

## 2020-10-06 DIAGNOSIS — R00.0: ICD-10-CM

## 2020-10-06 DIAGNOSIS — I50.43: ICD-10-CM

## 2020-10-06 DIAGNOSIS — E11.65: ICD-10-CM

## 2020-10-06 DIAGNOSIS — E87.6: ICD-10-CM

## 2020-10-06 DIAGNOSIS — D50.9: ICD-10-CM

## 2020-10-06 DIAGNOSIS — I11.0: ICD-10-CM

## 2020-10-06 DIAGNOSIS — N17.9: ICD-10-CM

## 2020-10-06 DIAGNOSIS — F17.203: ICD-10-CM

## 2020-10-06 DIAGNOSIS — I25.10: ICD-10-CM

## 2020-10-06 LAB
ALBUMIN SERPL-MCNC: 2.8 G/DL (ref 3.4–5)
ALP SERPL-CCNC: 117 U/L (ref 30–120)
ALT SERPL-CCNC: 18 U/L (ref 10–68)
AMPHETAMINES UR QL SCN: POSITIVE QUAL
ANION GAP SERPL CALC-SCNC: 11.4 MMOL/L (ref 8–16)
APTT BLD: 33.5 SECONDS (ref 22.8–39.4)
BACTERIA #/AREA URNS HPF: (no result) HPF
BARBITURATES UR QL SCN: NEGATIVE QUAL
BASOPHILS NFR BLD AUTO: 0.1 % (ref 0–2)
BENZODIAZ UR QL SCN: NEGATIVE QUAL
BILIRUB SERPL-MCNC: 5.9 MG/DL (ref 0.2–1.3)
BILIRUB SERPL-MCNC: NEGATIVE MG/DL
BUN SERPL-MCNC: 21 MG/DL (ref 7–18)
BZE UR QL SCN: NEGATIVE QUAL
CALCIUM SERPL-MCNC: 8.5 MG/DL (ref 8.5–10.1)
CANNABINOIDS UR QL SCN: NEGATIVE QUAL
CHLORIDE SERPL-SCNC: 96 MMOL/L (ref 98–107)
CK MB SERPL-MCNC: 2.8 U/L (ref 0–3.6)
CK SERPL-CCNC: 418 UL (ref 21–232)
CO2 SERPL-SCNC: 29.4 MMOL/L (ref 21–32)
CREAT SERPL-MCNC: 1.6 MG/DL (ref 0.6–1.3)
EOSINOPHIL NFR BLD: 0 % (ref 0–7)
ERYTHROCYTE [DISTWIDTH] IN BLOOD BY AUTOMATED COUNT: 22.3 % (ref 11.5–14.5)
GLOBULIN SER-MCNC: 4.5 G/L
GLUCOSE SERPL-MCNC: 108 MG/DL (ref 74–106)
HCT VFR BLD CALC: 32.9 % (ref 42–54)
HGB BLD-MCNC: 10.6 G/DL (ref 13.5–17.5)
IMM GRANULOCYTES NFR BLD: 0.3 % (ref 0–5)
INR PPP: 1.81 (ref 0.85–1.17)
KETONES UR STRIP-MCNC: NEGATIVE MG/DL
LYMPHOCYTES NFR BLD AUTO: 8.9 % (ref 15–50)
MAGNESIUM SERPL-MCNC: 1.8 MG/DL (ref 1.8–2.4)
MCH RBC QN AUTO: 22 PG (ref 26–34)
MCHC RBC AUTO-ENTMCNC: 32.2 G/DL (ref 31–37)
MCV RBC: 68.4 FL (ref 80–100)
MONOCYTES NFR BLD: 11.6 % (ref 2–11)
NEUTROPHILS NFR BLD AUTO: 79.1 % (ref 40–80)
NITRITE UR-MCNC: NEGATIVE MG/ML
OPIATES UR QL SCN: NEGATIVE QUAL
OSMOLALITY SERPL CALC.SUM OF ELEC: 271 MOSM/KG (ref 275–300)
PCP UR QL SCN: NEGATIVE QUAL
PH UR STRIP: 5 [PH] (ref 5–8)
PLATELET # BLD: 160 10X3/UL (ref 130–400)
POTASSIUM SERPL-SCNC: 2.8 MMOL/L (ref 3.5–5.1)
PROT SERPL-MCNC: 7.3 G/DL (ref 6.4–8.2)
PROTHROMBIN TIME: 20.7 SECONDS (ref 11.6–15)
RBC # BLD AUTO: 4.81 10X6/UL (ref 4.2–6.1)
SODIUM SERPL-SCNC: 134 MMOL/L (ref 136–145)
TROPONIN I SERPL-MCNC: 0.03 NG/ML (ref 0–0.06)
TSH SERPL-ACNC: 3.18 UIU/ML (ref 0.36–3.74)
UROBILINOGEN UR-MCNC: 4 MG/DL (ref ?–2)
WBC # BLD AUTO: 8.6 10X3/UL (ref 4.8–10.8)
WBC #/AREA URNS HPF: (no result) HPF (ref 0–1)

## 2020-10-06 NOTE — NUR
recieved  pt per stretcher from er.pt with a dx of hypokalemia and altered
mental status.pt is alert.resp are even et unlabored.pt with some diminished
breath sounds.denies any coughing.heartrate is regular.IV to left forearm
flushed.IV to right ac flushed.pt with good appetite.pt is poor
historian.callbell in reach. siderails up x2.will continue to monitor.

## 2020-10-07 VITALS — SYSTOLIC BLOOD PRESSURE: 112 MMHG | DIASTOLIC BLOOD PRESSURE: 66 MMHG

## 2020-10-07 VITALS — DIASTOLIC BLOOD PRESSURE: 79 MMHG | SYSTOLIC BLOOD PRESSURE: 112 MMHG

## 2020-10-07 VITALS — SYSTOLIC BLOOD PRESSURE: 113 MMHG | DIASTOLIC BLOOD PRESSURE: 76 MMHG

## 2020-10-07 VITALS — DIASTOLIC BLOOD PRESSURE: 86 MMHG | SYSTOLIC BLOOD PRESSURE: 112 MMHG

## 2020-10-07 LAB
ALBUMIN SERPL-MCNC: 2.6 G/DL (ref 3.4–5)
ALP SERPL-CCNC: 113 U/L (ref 30–120)
ALT SERPL-CCNC: 22 U/L (ref 10–68)
ANION GAP SERPL CALC-SCNC: 12.9 MMOL/L (ref 8–16)
BASOPHILS NFR BLD AUTO: 0.1 % (ref 0–2)
BILIRUB SERPL-MCNC: 5.61 MG/DL (ref 0.2–1.3)
BUN SERPL-MCNC: 24 MG/DL (ref 7–18)
CALCIUM SERPL-MCNC: 8.6 MG/DL (ref 8.5–10.1)
CHLORIDE SERPL-SCNC: 94 MMOL/L (ref 98–107)
CK MB SERPL-MCNC: 3.3 U/L (ref 0–3.6)
CK MB SERPL-MCNC: 4.2 U/L (ref 0–3.6)
CK MB SERPL-MCNC: 4.3 U/L (ref 0–3.6)
CK SERPL-CCNC: 321 UL (ref 21–232)
CK SERPL-CCNC: 329 UL (ref 21–232)
CK SERPL-CCNC: 554 UL (ref 21–232)
CO2 SERPL-SCNC: 29.5 MMOL/L (ref 21–32)
CREAT SERPL-MCNC: 1.5 MG/DL (ref 0.6–1.3)
EOSINOPHIL NFR BLD: 0.4 % (ref 0–7)
ERYTHROCYTE [DISTWIDTH] IN BLOOD BY AUTOMATED COUNT: 22.5 % (ref 11.5–14.5)
GLOBULIN SER-MCNC: 4.2 G/L
GLUCOSE SERPL-MCNC: 111 MG/DL (ref 74–106)
HCT VFR BLD CALC: 35 % (ref 42–54)
HGB BLD-MCNC: 11.2 G/DL (ref 13.5–17.5)
IMM GRANULOCYTES NFR BLD: 0.4 % (ref 0–5)
LYMPHOCYTES NFR BLD AUTO: 11 % (ref 15–50)
MAGNESIUM SERPL-MCNC: 1.9 MG/DL (ref 1.8–2.4)
MCH RBC QN AUTO: 22 PG (ref 26–34)
MCHC RBC AUTO-ENTMCNC: 32 G/DL (ref 31–37)
MCV RBC: 68.9 FL (ref 80–100)
MONOCYTES NFR BLD: 11.6 % (ref 2–11)
NEUTROPHILS NFR BLD AUTO: 76.5 % (ref 40–80)
OSMOLALITY SERPL CALC.SUM OF ELEC: 270 MOSM/KG (ref 275–300)
PLATELET # BLD: 167 10X3/UL (ref 130–400)
POTASSIUM SERPL-SCNC: 3.4 MMOL/L (ref 3.5–5.1)
PROT SERPL-MCNC: 6.8 G/DL (ref 6.4–8.2)
RBC # BLD AUTO: 5.08 10X6/UL (ref 4.2–6.1)
SODIUM SERPL-SCNC: 133 MMOL/L (ref 136–145)
TROPONIN I SERPL-MCNC: 0.02 NG/ML (ref 0–0.06)
TROPONIN I SERPL-MCNC: 0.03 NG/ML (ref 0–0.06)
TROPONIN I SERPL-MCNC: < 0.017 NG/ML (ref 0–0.06)
WBC # BLD AUTO: 7.5 10X3/UL (ref 4.8–10.8)

## 2020-10-07 NOTE — NUR
LEFT AMA WITH BROTHER KALIA. EXPLAINED RISK AND BENIFITS TO HIM.IV'S D/C'D AND
YANG CATH D/C'D. TELEMETRY MONITOR REMOVED. AMA PAPER SIGNED BY PT. BROTHER
STATED " HE HAS SCHIZOPHRENIA AND STOPPED TAKING HIS MEDICATION 3 YRS AGO. HE
THINKS YA ARE TRYING TO KILL HIME". THIS NURSE VOICED UNDERSTANDING. LEFT UNIT
IN W/C PROPELLED BY HIS BROTHER.

## 2020-10-07 NOTE — NUR
PT WITH SOME SHORTNESS OF BREATH. RESP NOTIFIED. 02 STARTED AT 2L/M PER NASAL
CANNULA.PT HAVING SOME SLEEP APNEA.WILL CONTINUE TO MONITOR.

## 2020-10-07 NOTE — NUR
PT LAYING IN BED. PT WITH FREQUENT NONPRODUCTIVE COUGH.PT NOT WEARING 02.YANG
IS PATENT WITH DARK COLORED URINE.WILL CONTINUE TO MONITOR.

## 2020-10-07 NOTE — NUR
1717 REPEAT GLLUCOSE AFTER 1/2 AMP DEXTROSE 50%   ORDERED STAT LAB FOR
GLUCOSE  WJEM SUGAR WAS 33 PT WAS ABLE TO SAY HIS MARCIN AND FOLLOW INSTRUCTIONS

## 2021-03-27 ENCOUNTER — HOSPITAL ENCOUNTER (INPATIENT)
Dept: HOSPITAL 84 - D.ER | Age: 46
LOS: 3 days | Discharge: HOME | DRG: 354 | End: 2021-03-30
Attending: FAMILY MEDICINE | Admitting: FAMILY MEDICINE
Payer: MEDICARE

## 2021-03-27 VITALS
WEIGHT: 195 LBS | WEIGHT: 195 LBS | BODY MASS INDEX: 27.92 KG/M2 | BODY MASS INDEX: 27.92 KG/M2 | HEIGHT: 70 IN | HEIGHT: 70 IN | BODY MASS INDEX: 27.92 KG/M2

## 2021-03-27 VITALS — DIASTOLIC BLOOD PRESSURE: 85 MMHG | SYSTOLIC BLOOD PRESSURE: 120 MMHG

## 2021-03-27 VITALS — SYSTOLIC BLOOD PRESSURE: 120 MMHG | DIASTOLIC BLOOD PRESSURE: 90 MMHG

## 2021-03-27 VITALS — DIASTOLIC BLOOD PRESSURE: 90 MMHG | SYSTOLIC BLOOD PRESSURE: 128 MMHG

## 2021-03-27 VITALS — DIASTOLIC BLOOD PRESSURE: 64 MMHG | SYSTOLIC BLOOD PRESSURE: 110 MMHG

## 2021-03-27 VITALS — DIASTOLIC BLOOD PRESSURE: 73 MMHG | SYSTOLIC BLOOD PRESSURE: 112 MMHG

## 2021-03-27 VITALS — SYSTOLIC BLOOD PRESSURE: 120 MMHG | DIASTOLIC BLOOD PRESSURE: 85 MMHG

## 2021-03-27 DIAGNOSIS — F17.203: ICD-10-CM

## 2021-03-27 DIAGNOSIS — R18.8: ICD-10-CM

## 2021-03-27 DIAGNOSIS — K43.6: Primary | ICD-10-CM

## 2021-03-27 DIAGNOSIS — E87.6: ICD-10-CM

## 2021-03-27 DIAGNOSIS — F12.90: ICD-10-CM

## 2021-03-27 DIAGNOSIS — Z72.89: ICD-10-CM

## 2021-03-27 DIAGNOSIS — I50.9: ICD-10-CM

## 2021-03-27 DIAGNOSIS — I08.1: ICD-10-CM

## 2021-03-27 DIAGNOSIS — E11.9: ICD-10-CM

## 2021-03-27 DIAGNOSIS — K74.60: ICD-10-CM

## 2021-03-27 DIAGNOSIS — I11.0: ICD-10-CM

## 2021-03-27 LAB
ALBUMIN SERPL-MCNC: 2.7 G/DL (ref 3.4–5)
ALP SERPL-CCNC: 83 U/L (ref 30–120)
ALT SERPL-CCNC: 14 U/L (ref 10–68)
ANION GAP SERPL CALC-SCNC: 13.6 MMOL/L (ref 8–16)
APTT BLD: 36.7 SECONDS (ref 22.8–39.4)
BASOPHILS NFR BLD AUTO: 1 % (ref 0–2)
BILIRUB SERPL-MCNC: 0.73 MG/DL (ref 0.2–1.3)
BUN SERPL-MCNC: 14 MG/DL (ref 7–18)
CALCIUM SERPL-MCNC: 8.5 MG/DL (ref 8.5–10.1)
CHLORIDE SERPL-SCNC: 108 MMOL/L (ref 98–107)
CK MB SERPL-MCNC: 1.1 U/L (ref 0–3.6)
CK SERPL-CCNC: 143 UL (ref 21–232)
CO2 SERPL-SCNC: 24.6 MMOL/L (ref 21–32)
CREAT SERPL-MCNC: 1.1 MG/DL (ref 0.6–1.3)
EOSINOPHIL NFR BLD: 6 % (ref 0–7)
ERYTHROCYTE [DISTWIDTH] IN BLOOD BY AUTOMATED COUNT: 20.5 % (ref 11.5–14.5)
GLOBULIN SER-MCNC: 4.2 G/L
GLUCOSE SERPL-MCNC: 104 MG/DL (ref 74–106)
HCT VFR BLD CALC: 32.8 % (ref 42–54)
HGB BLD-MCNC: 10.1 G/DL (ref 13.5–17.5)
IMM GRANULOCYTES NFR BLD: 0 % (ref 0–5)
INR PPP: 1.58 (ref 0.85–1.17)
LYMPHOCYTES # BLD: 0.99 10X3/UL (ref 1.32–3.57)
LYMPHOCYTES NFR BLD AUTO: 26 % (ref 15–50)
MAGNESIUM SERPL-MCNC: 1.8 MG/DL (ref 1.8–2.4)
MCH RBC QN AUTO: 23 PG (ref 26–34)
MCHC RBC AUTO-ENTMCNC: 30.8 G/DL (ref 31–37)
MCV RBC: 74.7 FL (ref 80–100)
MONOCYTES NFR BLD: 12.6 % (ref 2–11)
NEUTROPHILS # BLD: 2.07 10X3/UL (ref 1.78–5.38)
NEUTROPHILS NFR BLD AUTO: 54.4 % (ref 40–80)
NT-PROBNP SERPL-MCNC: 2013 PG/ML (ref 0–125)
OSMOLALITY SERPL CALC.SUM OF ELEC: 285 MOSM/KG (ref 275–300)
PLATELET # BLD: 171 10X3/UL (ref 130–400)
PMV BLD AUTO: 9.5 FL (ref 7.4–10.4)
POTASSIUM SERPL-SCNC: 3.2 MMOL/L (ref 3.5–5.1)
PROT SERPL-MCNC: 6.9 G/DL (ref 6.4–8.2)
PROTHROMBIN TIME: 17.5 SECONDS (ref 11.6–15)
RBC # BLD AUTO: 4.39 10X6/UL (ref 4.2–6.1)
SODIUM SERPL-SCNC: 143 MMOL/L (ref 136–145)
TROPONIN I SERPL-MCNC: < 0.017 NG/ML (ref 0–0.06)
WBC # BLD AUTO: 3.8 10X3/UL (ref 4.8–10.8)

## 2021-03-27 PROCEDURE — 0WUF0JZ SUPPLEMENT ABDOMINAL WALL WITH SYNTHETIC SUBSTITUTE, OPEN APPROACH: ICD-10-PCS | Performed by: SURGERY

## 2021-03-27 NOTE — NUR
RETURNED FROM SURGERY AND RESPONSIVE TO VERBAL STIMULI. YANG CATH PATENT WITH
STRAW COLOR URINE. ABDOMINAL DRESSING INTACT WITH BINDER. IVF INFUSING AT
PRESCRIBED RATE. ENCOURAGED TO USE CALL LIGHT FOR ASSSIT.

## 2021-03-27 NOTE — EC
PATIENT:RYANNE BOGGS               DATE OF SERVICE: 03/27/21
SEX: M                                  MEDICAL RECORD: [id number]
DATE OF BIRTH: [birth year]                        LOCATION:[facility]
AGE OF PATIENT: 45                             ADMISSION DATE: 03/27/21
 
REFERRING PHYSICIAN:                               
 
INTERPRETING PHYSICIAN: DONNA VILLAREAL MD              
 
 
 
                             ECHOCARDIOGRAM REPORT
  ECHO CHARGES 5               ECHO LIMITED                  Date: 03/28/21
 
 
 
CLINICAL DIAGNOSIS: PERICARDIAL EFFUSION          
 
                         ECHOCARDIOGRAPHIC MEASUREMENTS
      (adult normal given)
   AC root (d.<3.7cm) 0    cm   LV Septum d (<1.2 cm> 0    cm
      Valve Excursion 0    cm     LV Septum (systole) 0    cm
Left Atria (s.<4.0cm> 0    cm          LVPW d(<1.2cm) 0    cm
        RV (d.<2.3cm) 0    cm           LVPW (sytole) 0    cm
  LV diastole(<5.6CM) 0    cm       MV E-F(>70mm/sec) 0    cm
           LV systole 0    cm           LVOT Diameter 0    cm
       MV exc.(>10mm) 0    cm
Est.ejection fraction (50-75%) 0   %
 
   DOPPLER:
     LVIT      cm/sec A 0    cm/sec E 0     cm/sec
       LA      cm/sec      RVSP 0    mmHg
     LVOT 0    cm/sec   AOP1/2T 0    m/s
  Asc. Ao 0    cm/sec
     RVOT 0    cm/sec
       RA 0    cm/sec
       PA 0    cm/sec
 AV Gradient Peak 0    mmHg  AV Mean 0    mmHg  AV Area 0    cm
 MV Gradient Peak 0    mmHg  MV Mean 0    mmHg  MV Area      cm
   COMMENTS:                                              
 
 
 Cardiac Sonographer: Leidy THOMAS             
      Cardiologist: 4          Dr. Villareal               
             TAPE#                
                                       Pericardial Effusion Y                        
 
 
DATE OF SERVICE:  
 
This is a limited echocardiogram
 
Left ventricle shows mildly dilated left ventricular cavity with ejection
fraction of 35% to 40%, global hypokinesis.  The patient has moderate mitral
regurgitation and severe tricuspid regurgitation.
 
Aortic valve is grossly normal.  The right-sided structures are dilated.  It
appears the patient has dyskinetic left ventricular septum.  The patient's right
 
 
 
ECHOCARDIOGRAM REPORT                          [id number]    RYANNE BOGGS       
 
 
ventricular systolic pressures appear to be elevated, but we did not get good
envelopes for accurate estimation.
 
TRANSINT:[id number] Voice Confirmation ID: [id number] DOCUMENT ID: [id number]
                                           
                                           DONNA VILLAREAL MD              
 
 
 
 
 
 
 
 
 
 
 
 
 
 
 
 
 
 
 
 
 
 
 
 
 
 
 
 
 
 
 
 
 
 
 
 
 
 
 
 
CC:                                                             [id number]
DICTATION DATE: 03/28/21 1331     :     03/28/21 2121      ADM IN  
                                                                              
[facility]                                          
[address] [id number]

## 2021-03-27 NOTE — NUR
PATIENT RESTING IN BED WITH NO S/S OF DISTRESS AND EYES CLOSED AT THIS TIME.
BED IN LOWEST POSITION AND CALL LIGHT IN REACH.

## 2021-03-28 VITALS — DIASTOLIC BLOOD PRESSURE: 77 MMHG | SYSTOLIC BLOOD PRESSURE: 106 MMHG

## 2021-03-28 VITALS — SYSTOLIC BLOOD PRESSURE: 97 MMHG | DIASTOLIC BLOOD PRESSURE: 60 MMHG

## 2021-03-28 VITALS — SYSTOLIC BLOOD PRESSURE: 95 MMHG | DIASTOLIC BLOOD PRESSURE: 62 MMHG

## 2021-03-28 VITALS — DIASTOLIC BLOOD PRESSURE: 70 MMHG | SYSTOLIC BLOOD PRESSURE: 101 MMHG

## 2021-03-28 VITALS — SYSTOLIC BLOOD PRESSURE: 100 MMHG | DIASTOLIC BLOOD PRESSURE: 68 MMHG

## 2021-03-28 LAB
ALBUMIN SERPL-MCNC: 2.6 G/DL (ref 3.4–5)
ALP SERPL-CCNC: 83 U/L (ref 30–120)
ALT SERPL-CCNC: 13 U/L (ref 10–68)
AMPHETAMINES UR QL SCN: POSITIVE QUAL
ANION GAP SERPL CALC-SCNC: 14.4 MMOL/L (ref 8–16)
BACTERIA #/AREA URNS HPF: (no result) HPF
BARBITURATES UR QL SCN: NEGATIVE QUAL
BASOPHILS NFR BLD AUTO: 0.6 % (ref 0–2)
BENZODIAZ UR QL SCN: NEGATIVE QUAL
BILIRUB SERPL-MCNC: 0.88 MG/DL (ref 0.2–1.3)
BILIRUB SERPL-MCNC: NEGATIVE MG/DL
BUN SERPL-MCNC: 15 MG/DL (ref 7–18)
BZE UR QL SCN: NEGATIVE QUAL
CALCIUM SERPL-MCNC: 8.3 MG/DL (ref 8.5–10.1)
CANNABINOIDS UR QL SCN: NEGATIVE QUAL
CHLORIDE SERPL-SCNC: 108 MMOL/L (ref 98–107)
CO2 SERPL-SCNC: 22.7 MMOL/L (ref 21–32)
CREAT SERPL-MCNC: 1.4 MG/DL (ref 0.6–1.3)
EOSINOPHIL NFR BLD: 2.7 % (ref 0–7)
ERYTHROCYTE [DISTWIDTH] IN BLOOD BY AUTOMATED COUNT: 20.6 % (ref 11.5–14.5)
GLOBULIN SER-MCNC: 4.2 G/L
GLUCOSE SERPL-MCNC: 102 MG/DL (ref 74–106)
HCT VFR BLD CALC: 35.3 % (ref 42–54)
HGB BLD-MCNC: 10.5 G/DL (ref 13.5–17.5)
IMM GRANULOCYTES NFR BLD: 0.2 % (ref 0–5)
KETONES UR STRIP-MCNC: NEGATIVE MG/DL
LYMPHOCYTES # BLD: 0.69 10X3/UL (ref 1.32–3.57)
LYMPHOCYTES NFR BLD AUTO: 14.2 % (ref 15–50)
MAGNESIUM SERPL-MCNC: 1.9 MG/DL (ref 1.8–2.4)
MCH RBC QN AUTO: 22.7 PG (ref 26–34)
MCHC RBC AUTO-ENTMCNC: 29.7 G/DL (ref 31–37)
MCV RBC: 76.4 FL (ref 80–100)
MONOCYTES NFR BLD: 15.9 % (ref 2–11)
NEUTROPHILS # BLD: 3.22 10X3/UL (ref 1.78–5.38)
NEUTROPHILS NFR BLD AUTO: 66.4 % (ref 40–80)
NITRITE UR-MCNC: NEGATIVE MG/ML
OPIATES UR QL SCN: POSITIVE QUAL
OSMOLALITY SERPL CALC.SUM OF ELEC: 281 MOSM/KG (ref 275–300)
PCP UR QL SCN: NEGATIVE QUAL
PH UR STRIP: 5 [PH] (ref 5–8)
PHOSPHATE SERPL-MCNC: 4.7 MG/DL (ref 2.5–4.9)
PLATELET # BLD: 179 10X3/UL (ref 130–400)
PMV BLD AUTO: 9.4 FL (ref 7.4–10.4)
POTASSIUM SERPL-SCNC: 4.1 MMOL/L (ref 3.5–5.1)
PROT SERPL-MCNC: 6.8 G/DL (ref 6.4–8.2)
RBC # BLD AUTO: 4.62 10X6/UL (ref 4.2–6.1)
SODIUM SERPL-SCNC: 141 MMOL/L (ref 136–145)
SQUAMOUS #/AREA URNS HPF: (no result) HPF (ref 0–4)
UROBILINOGEN UR-MCNC: NORMAL MG/DL (ref ?–2)
WBC # BLD AUTO: 4.9 10X3/UL (ref 4.8–10.8)
WBC #/AREA URNS HPF: (no result) HPF (ref 0–1)

## 2021-03-28 NOTE — NUR
PATIENT RESTING IN BED WITH NO S/S OF DISTRESS. PATIENT DENIES NEEDS AT THIS
TIME. BED IN LOWEST POSITION AND CALL LIGHT IN REACH. ENCOURAGED PATIENT TO
CALL IF HE HAS NEEDS.

## 2021-03-29 VITALS — DIASTOLIC BLOOD PRESSURE: 45 MMHG | SYSTOLIC BLOOD PRESSURE: 100 MMHG

## 2021-03-29 VITALS — DIASTOLIC BLOOD PRESSURE: 50 MMHG | SYSTOLIC BLOOD PRESSURE: 100 MMHG

## 2021-03-29 VITALS — DIASTOLIC BLOOD PRESSURE: 60 MMHG | SYSTOLIC BLOOD PRESSURE: 98 MMHG

## 2021-03-29 VITALS — DIASTOLIC BLOOD PRESSURE: 62 MMHG | SYSTOLIC BLOOD PRESSURE: 95 MMHG

## 2021-03-29 VITALS — SYSTOLIC BLOOD PRESSURE: 100 MMHG | DIASTOLIC BLOOD PRESSURE: 61 MMHG

## 2021-03-29 VITALS — SYSTOLIC BLOOD PRESSURE: 90 MMHG | DIASTOLIC BLOOD PRESSURE: 50 MMHG

## 2021-03-29 LAB
ALBUMIN SERPL-MCNC: 2.2 G/DL (ref 3.4–5)
ALP SERPL-CCNC: 77 U/L (ref 30–120)
ALT SERPL-CCNC: 9 U/L (ref 10–68)
ANION GAP SERPL CALC-SCNC: 9.8 MMOL/L (ref 8–16)
BASOPHILS NFR BLD AUTO: 0.5 % (ref 0–2)
BILIRUB SERPL-MCNC: 0.65 MG/DL (ref 0.2–1.3)
BUN SERPL-MCNC: 12 MG/DL (ref 7–18)
CALCIUM SERPL-MCNC: 7.9 MG/DL (ref 8.5–10.1)
CHLORIDE SERPL-SCNC: 106 MMOL/L (ref 98–107)
CO2 SERPL-SCNC: 25.2 MMOL/L (ref 21–32)
CREAT SERPL-MCNC: 1.2 MG/DL (ref 0.6–1.3)
EOSINOPHIL NFR BLD: 5.3 % (ref 0–7)
ERYTHROCYTE [DISTWIDTH] IN BLOOD BY AUTOMATED COUNT: 20.9 % (ref 11.5–14.5)
GLOBULIN SER-MCNC: 4 G/L
GLUCOSE SERPL-MCNC: 77 MG/DL (ref 74–106)
HCT VFR BLD CALC: 33.4 % (ref 42–54)
HGB BLD-MCNC: 10 G/DL (ref 13.5–17.5)
IMM GRANULOCYTES NFR BLD: 0.2 % (ref 0–5)
LYMPHOCYTES # BLD: 0.78 10X3/UL (ref 1.32–3.57)
LYMPHOCYTES NFR BLD AUTO: 18.1 % (ref 15–50)
MCH RBC QN AUTO: 22.8 PG (ref 26–34)
MCHC RBC AUTO-ENTMCNC: 29.9 G/DL (ref 31–37)
MCV RBC: 76.3 FL (ref 80–100)
MONOCYTES NFR BLD: 14.4 % (ref 2–11)
NEUTROPHILS # BLD: 2.65 10X3/UL (ref 1.78–5.38)
NEUTROPHILS NFR BLD AUTO: 61.5 % (ref 40–80)
OSMOLALITY SERPL CALC.SUM OF ELEC: 272 MOSM/KG (ref 275–300)
PLATELET # BLD: 147 10X3/UL (ref 130–400)
POTASSIUM SERPL-SCNC: 4 MMOL/L (ref 3.5–5.1)
PROT SERPL-MCNC: 6.2 G/DL (ref 6.4–8.2)
RBC # BLD AUTO: 4.38 10X6/UL (ref 4.2–6.1)
SODIUM SERPL-SCNC: 137 MMOL/L (ref 136–145)
WBC # BLD AUTO: 4.3 10X3/UL (ref 4.8–10.8)

## 2021-03-30 VITALS — SYSTOLIC BLOOD PRESSURE: 110 MMHG | DIASTOLIC BLOOD PRESSURE: 71 MMHG

## 2021-03-30 VITALS — SYSTOLIC BLOOD PRESSURE: 103 MMHG | DIASTOLIC BLOOD PRESSURE: 70 MMHG

## 2021-03-30 VITALS — DIASTOLIC BLOOD PRESSURE: 61 MMHG | SYSTOLIC BLOOD PRESSURE: 103 MMHG

## 2021-03-30 VITALS — DIASTOLIC BLOOD PRESSURE: 69 MMHG | SYSTOLIC BLOOD PRESSURE: 110 MMHG

## 2021-03-30 LAB
ALBUMIN SERPL-MCNC: 2.5 G/DL (ref 3.4–5)
ALP SERPL-CCNC: 84 U/L (ref 30–120)
ALT SERPL-CCNC: 12 U/L (ref 10–68)
ANION GAP SERPL CALC-SCNC: 12 MMOL/L (ref 8–16)
BASOPHILS NFR BLD AUTO: 0.6 % (ref 0–2)
BILIRUB SERPL-MCNC: 0.62 MG/DL (ref 0.2–1.3)
BUN SERPL-MCNC: 11 MG/DL (ref 7–18)
CALCIUM SERPL-MCNC: 8.2 MG/DL (ref 8.5–10.1)
CHLORIDE SERPL-SCNC: 105 MMOL/L (ref 98–107)
CO2 SERPL-SCNC: 26.6 MMOL/L (ref 21–32)
CREAT SERPL-MCNC: 1.1 MG/DL (ref 0.6–1.3)
EOSINOPHIL NFR BLD: 6.2 % (ref 0–7)
ERYTHROCYTE [DISTWIDTH] IN BLOOD BY AUTOMATED COUNT: 20.2 % (ref 11.5–14.5)
GLOBULIN SER-MCNC: 3.7 G/L
GLUCOSE SERPL-MCNC: 83 MG/DL (ref 74–106)
HCT VFR BLD CALC: 33.6 % (ref 42–54)
HGB BLD-MCNC: 10.4 G/DL (ref 13.5–17.5)
IMM GRANULOCYTES NFR BLD: 0.2 % (ref 0–5)
LYMPHOCYTES # BLD: 1.11 10X3/UL (ref 1.32–3.57)
LYMPHOCYTES NFR BLD AUTO: 23.6 % (ref 15–50)
MAGNESIUM SERPL-MCNC: 1.6 MG/DL (ref 1.8–2.4)
MCH RBC QN AUTO: 22.6 PG (ref 26–34)
MCHC RBC AUTO-ENTMCNC: 31 G/DL (ref 31–37)
MCV RBC: 73 FL (ref 80–100)
MONOCYTES NFR BLD: 14.9 % (ref 2–11)
NEUTROPHILS # BLD: 2.56 10X3/UL (ref 1.78–5.38)
NEUTROPHILS NFR BLD AUTO: 54.5 % (ref 40–80)
OSMOLALITY SERPL CALC.SUM OF ELEC: 276 MOSM/KG (ref 275–300)
PLATELET # BLD: 174 10X3/UL (ref 130–400)
PMV BLD AUTO: 9.2 FL (ref 7.4–10.4)
POTASSIUM SERPL-SCNC: 3.6 MMOL/L (ref 3.5–5.1)
PROT SERPL-MCNC: 6.2 G/DL (ref 6.4–8.2)
RBC # BLD AUTO: 4.6 10X6/UL (ref 4.2–6.1)
SODIUM SERPL-SCNC: 140 MMOL/L (ref 136–145)
WBC # BLD AUTO: 4.7 10X3/UL (ref 4.8–10.8)

## 2021-03-30 NOTE — NUR
0700 BEDSIDE SHIFT RECEIVED  AWAKE AND ALERT IN BED  VOICES NO COMPLAINTS
VOIDING CLEAR YELLOW URINE IN URINAL ADVOCATE RAISA 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    MEDICATIONS:  Current Outpatient Medications   Medication Sig   • mirtazapine (REMERON) 15 MG tablet Take 1 tablet by mouth nightly.     No current facility-administered medications for this visit.        ALLERGIES:  ALLERGIES:   Allergen Reactions   • Morphine HIVES       PAST SURGICAL HISTORY:  No past surgical history on file.    FAMILY HISTORY:  No family history on file.    SOCIAL HISTORY:  Social History     Tobacco Use   • Smoking status: Current Every Day Smoker     Packs/day: 0.25     Years: 15.00     Pack years: 3.75   • Smokeless tobacco: Never Used   Substance Use Topics   • Alcohol use: No     Frequency: Never   • Drug use: Never       Review of Systems  General: negative except for HPI  Eye Problem(s):negative   ENT Problem(s):negative   Cardiovascular problem(s):negative   Respiratory problem(s):negative   Gastro-intestinal problem(s):negative except for HPI  Genito-urinary problem(s):negative   Musculoskeletal problem(s):negative   Integumentary problem(s):negative   Neurological problem(s):negative   Psychiatric problem(s):negative   Endocrine problem(s):negative   Hematologic and/or Lymphatic problem(s):negative       OBJECTIVE:     Physical  Exam  Constitutional: alert, in no acute distress and current vital signs reviewed.   Head and Face : atraumatic, no deformities, normocephalic. no sinus tenderness   ENT : normal appearing outer ear, examination of the tympanic membrane showed normal landmarks, normal appearing external canal. nasal mucosa moist and pink. Scant clear nasal discharge. Bilateral nasal turbinates: pale and boggy (L>R), +PND. normal lips. oral mucosa pink and moist, no oral lesions and normal appearing tongue . Slight erythema to posterior pharyngeal area. There was no tonsillar enlargement, erythema or exudates to both tonsils.   Lymphatic : no lymphadenopathy   Pulmonary: no respiratory distress, normal respiratory rate and effort and no accessory muscle use. Breath sounds clear to auscultation bilaterally. no wheezing/rales/rhonchi   Cardiovascular: normal rate, regular rhythm, S1 and S2, no murmurs were heard, No LE edema   Musculoskeletal: normal gait.  Integumentary: skin warm dry and intact. No rashes, lesions, skin breakdown  Neurologic : Alert, oriented to person, place and time. Speech clear  Psychiatric: Pleasant, calm, cooperative      Visit Vitals  /78 (BP Location: LUE - Left upper extremity, Patient Position: Sitting, Cuff Size: Regular)   Pulse 74   Temp 97.5 °F (36.4 °C) (Oral)   Ht 5' 8\" (1.727 m)   Wt 61.7 kg (136 lb 0.4 oz)   SpO2 98%   BMI 20.68 kg/m²       DIAGNOSTIC STUDIES:   LAB/RADIOLOGY RESULTS:    No results found for this visit on 12/06/19.  ASSESSMENT AND PLAN:     Migraine with aura and without status migrainosus, not intractable  (primary encounter diagnosis)  Acute rhinitis  Plan:  Try Excedrin migraine PRN  Ondansetron as needed for nausea  Drink plenty of fluids  Avoid triggers  Flonase for nasal congestion.  Consider nasal saline spray/anti-histamine PRN for runny nose    Follow-up & Referral:  Follow-up with your PCP in 3-5 days or earlier if symptoms do not improve or worsens  Follow up for  any questions, concerns, or worsening of symptoms.     ER precautions: if with severe headache, nausea, vomiting, elevated BP, vision change  Medical compliance with plan discussed and risks of noncompliance reviewed    Patient verbalizes understanding of treatment plan    Additional Notes:   Written handout given.    Patient verbalized understanding of the plan.    Medical compliance with plan discussed and risks of non-compliance reviewed.    Proper usage and side effects of medications reviewed & discussed.        Take medication as directed.     Thank you for visiting [facility].      Misa Stone, CNP  [facility] [place]

## 2021-03-30 NOTE — NUR
1450 PT DISCHARGED  PATIENT HAD PULLED OUT HIS OWN IV WHEN NURSE WAS OUT OF
ROOM  WRITTEN AND VERBAL DISCHARGE INSTRUCTIONS GIVEN  PAPER RX FOR LUIS
PROVIDED  ALL PAPERWORK SIGNED  WAITING ON HIS BROTHER TO PICK HIM UP

## 2021-04-20 ENCOUNTER — HOSPITAL ENCOUNTER (EMERGENCY)
Dept: HOSPITAL 84 - D.ER | Age: 46
Discharge: HOME | End: 2021-04-20
Payer: MEDICARE

## 2021-04-20 VITALS — HEIGHT: 70 IN | BODY MASS INDEX: 27.98 KG/M2 | WEIGHT: 195.41 LBS

## 2021-04-20 VITALS — SYSTOLIC BLOOD PRESSURE: 112 MMHG | DIASTOLIC BLOOD PRESSURE: 73 MMHG

## 2021-04-20 DIAGNOSIS — Z48.89: Primary | ICD-10-CM

## 2021-04-20 DIAGNOSIS — E11.9: ICD-10-CM

## 2021-05-01 ENCOUNTER — HOSPITAL ENCOUNTER (INPATIENT)
Dept: HOSPITAL 84 - D.ER | Age: 46
LOS: 3 days | Discharge: HOME | DRG: 293 | End: 2021-05-04
Attending: FAMILY MEDICINE | Admitting: FAMILY MEDICINE
Payer: MEDICARE

## 2021-05-01 VITALS — SYSTOLIC BLOOD PRESSURE: 137 MMHG | DIASTOLIC BLOOD PRESSURE: 99 MMHG

## 2021-05-01 VITALS
HEIGHT: 70 IN | WEIGHT: 195 LBS | BODY MASS INDEX: 27.92 KG/M2 | WEIGHT: 195 LBS | BODY MASS INDEX: 27.92 KG/M2 | HEIGHT: 70 IN

## 2021-05-01 VITALS — DIASTOLIC BLOOD PRESSURE: 81 MMHG | SYSTOLIC BLOOD PRESSURE: 106 MMHG

## 2021-05-01 VITALS — SYSTOLIC BLOOD PRESSURE: 111 MMHG | DIASTOLIC BLOOD PRESSURE: 54 MMHG

## 2021-05-01 VITALS — SYSTOLIC BLOOD PRESSURE: 106 MMHG | DIASTOLIC BLOOD PRESSURE: 81 MMHG

## 2021-05-01 VITALS — SYSTOLIC BLOOD PRESSURE: 100 MMHG | DIASTOLIC BLOOD PRESSURE: 72 MMHG

## 2021-05-01 VITALS — SYSTOLIC BLOOD PRESSURE: 103 MMHG | DIASTOLIC BLOOD PRESSURE: 75 MMHG

## 2021-05-01 DIAGNOSIS — E11.69: ICD-10-CM

## 2021-05-01 DIAGNOSIS — I11.0: Primary | ICD-10-CM

## 2021-05-01 DIAGNOSIS — Z91.14: ICD-10-CM

## 2021-05-01 DIAGNOSIS — I50.23: ICD-10-CM

## 2021-05-01 DIAGNOSIS — I42.7: ICD-10-CM

## 2021-05-01 DIAGNOSIS — Z91.128: ICD-10-CM

## 2021-05-01 DIAGNOSIS — Z72.0: ICD-10-CM

## 2021-05-01 LAB
ALBUMIN SERPL-MCNC: 2.5 G/DL (ref 3.4–5)
ALP SERPL-CCNC: 94 U/L (ref 30–120)
ALT SERPL-CCNC: 18 U/L (ref 10–68)
ANION GAP SERPL CALC-SCNC: 11.9 MMOL/L (ref 8–16)
APTT BLD: 35.3 SECONDS (ref 22.8–39.4)
BASOPHILS NFR BLD AUTO: 1.2 % (ref 0–2)
BILIRUB SERPL-MCNC: 0.71 MG/DL (ref 0.2–1.3)
BUN SERPL-MCNC: 12 MG/DL (ref 7–18)
CALCIUM SERPL-MCNC: 8.5 MG/DL (ref 8.5–10.1)
CHLORIDE SERPL-SCNC: 109 MMOL/L (ref 98–107)
CK MB SERPL-MCNC: 1.9 U/L (ref 0–3.6)
CK SERPL-CCNC: 139 UL (ref 21–232)
CO2 SERPL-SCNC: 25.3 MMOL/L (ref 21–32)
CREAT SERPL-MCNC: 1 MG/DL (ref 0.6–1.3)
EOSINOPHIL NFR BLD: 5.1 % (ref 0–7)
ERYTHROCYTE [DISTWIDTH] IN BLOOD BY AUTOMATED COUNT: 18.8 % (ref 11.5–14.5)
EST. AVERAGE GLUCOSE BLD GHB EST-MCNC: 131 MG/DL (ref 74–154)
GLOBULIN SER-MCNC: 4.1 G/L
GLUCOSE SERPL-MCNC: 100 MG/DL (ref 74–106)
HCT VFR BLD CALC: 34 % (ref 42–54)
HGB BLD-MCNC: 10.6 G/DL (ref 13.5–17.5)
IMM GRANULOCYTES NFR BLD: 0.2 % (ref 0–5)
INR PPP: 1.56 (ref 0.85–1.17)
LYMPHOCYTES # BLD: 0.98 10X3/UL (ref 1.32–3.57)
LYMPHOCYTES NFR BLD AUTO: 24 % (ref 15–50)
MCH RBC QN AUTO: 22.7 PG (ref 26–34)
MCHC RBC AUTO-ENTMCNC: 31.2 G/DL (ref 31–37)
MCV RBC: 72.8 FL (ref 80–100)
MONOCYTES NFR BLD: 12.7 % (ref 2–11)
NEUTROPHILS # BLD: 2.31 10X3/UL (ref 1.78–5.38)
NEUTROPHILS NFR BLD AUTO: 56.8 % (ref 40–80)
NT-PROBNP SERPL-MCNC: 2196 PG/ML (ref 0–125)
OSMOLALITY SERPL CALC.SUM OF ELEC: 282 MOSM/KG (ref 275–300)
PLATELET # BLD: 165 10X3/UL (ref 130–400)
POTASSIUM SERPL-SCNC: 4.2 MMOL/L (ref 3.5–5.1)
PROT SERPL-MCNC: 6.6 G/DL (ref 6.4–8.2)
PROTHROMBIN TIME: 17.3 SECONDS (ref 11.6–15)
RBC # BLD AUTO: 4.67 10X6/UL (ref 4.2–6.1)
SODIUM SERPL-SCNC: 142 MMOL/L (ref 136–145)
TROPONIN I SERPL-MCNC: < 0.017 NG/ML (ref 0–0.06)
WBC # BLD AUTO: 4.1 10X3/UL (ref 4.8–10.8)

## 2021-05-01 NOTE — NUR
BLOOD SUGAR , 2UNITS GIVEN PER S/S ALSO GAVE NORCO FOR PAIN LEVEL OF
6/10. PT DENIES ANY OTHER NEEDS AT THIS TIME. CALL LIGHT IN REACH.

## 2021-05-01 NOTE — NUR
RECEIVED REPORT, WILL ASSUME CARE OF PT, WATCHING TV, DENIES ANY NEEDS AT THIS
TIME, BED IS LOW, SRX1, CALL LIGHT IN REACH, WILL CONTINUE PLAN OF CARE

## 2021-05-01 NOTE — NUR
INITIAL ROUNDS- PT RESTING COMFORTABLY IN BED WITH EYES CLOSED, EASILY AROUSES
TO VOICE. RESP EVEN AND NONLABORED ON 2L. RT HAND IV SL, SR -97 ON TELE. PT
DRNIES ANY NEEDS AT THIS TIME. CALL LIGHT IN REACH, WILL CONTINEU PLAN OF
CARE.

## 2021-05-02 VITALS — SYSTOLIC BLOOD PRESSURE: 93 MMHG | DIASTOLIC BLOOD PRESSURE: 56 MMHG

## 2021-05-02 VITALS — SYSTOLIC BLOOD PRESSURE: 100 MMHG | DIASTOLIC BLOOD PRESSURE: 61 MMHG

## 2021-05-02 VITALS — DIASTOLIC BLOOD PRESSURE: 66 MMHG | SYSTOLIC BLOOD PRESSURE: 91 MMHG

## 2021-05-02 VITALS — DIASTOLIC BLOOD PRESSURE: 54 MMHG | SYSTOLIC BLOOD PRESSURE: 99 MMHG

## 2021-05-02 VITALS — DIASTOLIC BLOOD PRESSURE: 56 MMHG | SYSTOLIC BLOOD PRESSURE: 100 MMHG

## 2021-05-02 VITALS — DIASTOLIC BLOOD PRESSURE: 53 MMHG | SYSTOLIC BLOOD PRESSURE: 95 MMHG

## 2021-05-02 LAB
AMPHETAMINES UR QL SCN: NEGATIVE QUAL
ANION GAP SERPL CALC-SCNC: 12.4 MMOL/L (ref 8–16)
BARBITURATES UR QL SCN: NEGATIVE QUAL
BASOPHILS NFR BLD AUTO: 1 % (ref 0–2)
BENZODIAZ UR QL SCN: NEGATIVE QUAL
BUN SERPL-MCNC: 11 MG/DL (ref 7–18)
BZE UR QL SCN: NEGATIVE QUAL
CALCIUM SERPL-MCNC: 8.4 MG/DL (ref 8.5–10.1)
CANNABINOIDS UR QL SCN: NEGATIVE QUAL
CHLORIDE SERPL-SCNC: 106 MMOL/L (ref 98–107)
CO2 SERPL-SCNC: 24.1 MMOL/L (ref 21–32)
CREAT SERPL-MCNC: 1.2 MG/DL (ref 0.6–1.3)
EOSINOPHIL NFR BLD: 5.4 % (ref 0–7)
ERYTHROCYTE [DISTWIDTH] IN BLOOD BY AUTOMATED COUNT: 18.6 % (ref 11.5–14.5)
GLUCOSE SERPL-MCNC: 129 MG/DL (ref 74–106)
HCT VFR BLD CALC: 33 % (ref 42–54)
HGB BLD-MCNC: 10.2 G/DL (ref 13.5–17.5)
IMM GRANULOCYTES NFR BLD: 0 % (ref 0–5)
LYMPHOCYTES # BLD: 0.96 10X3/UL (ref 1.32–3.57)
LYMPHOCYTES NFR BLD AUTO: 24.7 % (ref 15–50)
MAGNESIUM SERPL-MCNC: 1.7 MG/DL (ref 1.8–2.4)
MCH RBC QN AUTO: 22.1 PG (ref 26–34)
MCHC RBC AUTO-ENTMCNC: 30.9 G/DL (ref 31–37)
MCV RBC: 71.6 FL (ref 80–100)
MONOCYTES NFR BLD: 13.7 % (ref 2–11)
NEUTROPHILS # BLD: 2.14 10X3/UL (ref 1.78–5.38)
NEUTROPHILS NFR BLD AUTO: 55.2 % (ref 40–80)
OPIATES UR QL SCN: POSITIVE QUAL
OSMOLALITY SERPL CALC.SUM OF ELEC: 278 MOSM/KG (ref 275–300)
PCP UR QL SCN: NEGATIVE QUAL
PHOSPHATE SERPL-MCNC: 5.5 MG/DL (ref 2.5–4.9)
PLATELET # BLD: 178 10X3/UL (ref 130–400)
PMV BLD AUTO: 9.4 FL (ref 7.4–10.4)
POTASSIUM SERPL-SCNC: 3.5 MMOL/L (ref 3.5–5.1)
RBC # BLD AUTO: 4.61 10X6/UL (ref 4.2–6.1)
SODIUM SERPL-SCNC: 139 MMOL/L (ref 136–145)
WBC # BLD AUTO: 3.9 10X3/UL (ref 4.8–10.8)

## 2021-05-02 NOTE — NUR
AM ROUNDS- PT RESTING COMFORTABLY IN BED, A/O X4, RESP EVEN BUT A LITTLE
LABORED ON RA, PLACED O2 ON 2L AND ENCOURAGED PT TO TAKE IN SLOW DEEP BREATHS.
SR-70 ON TELE. RT HAND IV SL. ALL NEEDS MET, CALL LIGHT IN REACH, WILL
CONTINUE PLAN OF CARE.

## 2021-05-02 NOTE — NUR
BLOOD SUGAR , NO COVERAGE NEEDED PER S/S. ALSO GAVE 40MEQ OF K FOR K OF
3.5 AND 800MG OF MAG FOR LOW MAG O F 1.7. ALL NEEDS MET, CALL LIGHT IN REACH.

## 2021-05-02 NOTE — NUR
BLOOD SUGAR , NO COVERAGE NEEDED PER S/S. INFORMED PT THAT WE NEED A
URINE SAMPLE, COLLECTION CUP LEFT AT BEDSIDE. ALL NEEDS MET.

## 2021-05-03 VITALS — SYSTOLIC BLOOD PRESSURE: 96 MMHG | DIASTOLIC BLOOD PRESSURE: 72 MMHG

## 2021-05-03 VITALS — DIASTOLIC BLOOD PRESSURE: 43 MMHG | SYSTOLIC BLOOD PRESSURE: 77 MMHG

## 2021-05-03 VITALS — SYSTOLIC BLOOD PRESSURE: 91 MMHG | DIASTOLIC BLOOD PRESSURE: 60 MMHG

## 2021-05-03 LAB
ALBUMIN SERPL-MCNC: 2.3 G/DL (ref 3.4–5)
ALP SERPL-CCNC: 90 U/L (ref 30–120)
ALT SERPL-CCNC: 18 U/L (ref 10–68)
ANION GAP SERPL CALC-SCNC: 12.3 MMOL/L (ref 8–16)
BASOPHILS NFR BLD AUTO: 0.9 % (ref 0–2)
BILIRUB SERPL-MCNC: 0.43 MG/DL (ref 0.2–1.3)
BUN SERPL-MCNC: 11 MG/DL (ref 7–18)
CALCIUM SERPL-MCNC: 8.5 MG/DL (ref 8.5–10.1)
CHLORIDE SERPL-SCNC: 103 MMOL/L (ref 98–107)
CO2 SERPL-SCNC: 25.8 MMOL/L (ref 21–32)
CREAT SERPL-MCNC: 1 MG/DL (ref 0.6–1.3)
EOSINOPHIL NFR BLD: 6 % (ref 0–7)
ERYTHROCYTE [DISTWIDTH] IN BLOOD BY AUTOMATED COUNT: 18.7 % (ref 11.5–14.5)
GLOBULIN SER-MCNC: 3.6 G/L
GLUCOSE SERPL-MCNC: 97 MG/DL (ref 74–106)
HCT VFR BLD CALC: 32.4 % (ref 42–54)
HGB BLD-MCNC: 10 G/DL (ref 13.5–17.5)
IMM GRANULOCYTES NFR BLD: 0 % (ref 0–5)
INR PPP: 1.61 (ref 0.85–1.17)
LYMPHOCYTES # BLD: 1.06 10X3/UL (ref 1.32–3.57)
LYMPHOCYTES NFR BLD AUTO: 31.9 % (ref 15–50)
MAGNESIUM SERPL-MCNC: 1.8 MG/DL (ref 1.8–2.4)
MCH RBC QN AUTO: 22.1 PG (ref 26–34)
MCHC RBC AUTO-ENTMCNC: 30.9 G/DL (ref 31–37)
MCV RBC: 71.7 FL (ref 80–100)
MONOCYTES NFR BLD: 17.5 % (ref 2–11)
NEUTROPHILS # BLD: 1.45 10X3/UL (ref 1.78–5.38)
NEUTROPHILS NFR BLD AUTO: 43.7 % (ref 40–80)
OSMOLALITY SERPL CALC.SUM OF ELEC: 272 MOSM/KG (ref 275–300)
PHOSPHATE SERPL-MCNC: 5.3 MG/DL (ref 2.5–4.9)
PLATELET # BLD: 205 10X3/UL (ref 130–400)
PMV BLD AUTO: 10.4 FL (ref 7.4–10.4)
POTASSIUM SERPL-SCNC: 4.1 MMOL/L (ref 3.5–5.1)
PROT SERPL-MCNC: 5.9 G/DL (ref 6.4–8.2)
PROTHROMBIN TIME: 17.7 SECONDS (ref 11.6–15)
RBC # BLD AUTO: 4.52 10X6/UL (ref 4.2–6.1)
SODIUM SERPL-SCNC: 137 MMOL/L (ref 136–145)
WBC # BLD AUTO: 3.3 10X3/UL (ref 4.8–10.8)

## 2021-05-03 NOTE — NUR
REPORT RECEIVED, PT A&O, UP IN BED WATCHING TV. NO S/S OF DISTRESS OBSERVED.
RR EVEN & UNLABORED ON 2L. IV TO R HAND PATENT AND SL. BED LOCKED AND
LOWERED, CL IN REACH. ASSESSMENT COMPLETE. WILL CONT POC.

## 2021-05-04 VITALS — SYSTOLIC BLOOD PRESSURE: 94 MMHG | DIASTOLIC BLOOD PRESSURE: 66 MMHG

## 2021-05-04 VITALS — DIASTOLIC BLOOD PRESSURE: 59 MMHG | SYSTOLIC BLOOD PRESSURE: 91 MMHG

## 2021-05-04 VITALS — SYSTOLIC BLOOD PRESSURE: 86 MMHG | DIASTOLIC BLOOD PRESSURE: 58 MMHG

## 2021-05-04 LAB
ALBUMIN SERPL-MCNC: 2.4 G/DL (ref 3.4–5)
ALP SERPL-CCNC: 96 U/L (ref 30–120)
ALT SERPL-CCNC: 16 U/L (ref 10–68)
ANION GAP SERPL CALC-SCNC: 13.3 MMOL/L (ref 8–16)
BASOPHILS NFR BLD AUTO: 1.1 % (ref 0–2)
BILIRUB SERPL-MCNC: 0.64 MG/DL (ref 0.2–1.3)
BUN SERPL-MCNC: 11 MG/DL (ref 7–18)
CALCIUM SERPL-MCNC: 8.6 MG/DL (ref 8.5–10.1)
CHLORIDE SERPL-SCNC: 106 MMOL/L (ref 98–107)
CO2 SERPL-SCNC: 25 MMOL/L (ref 21–32)
CREAT SERPL-MCNC: 1 MG/DL (ref 0.6–1.3)
EOSINOPHIL NFR BLD: 5.6 % (ref 0–7)
ERYTHROCYTE [DISTWIDTH] IN BLOOD BY AUTOMATED COUNT: 18.6 % (ref 11.5–14.5)
GLOBULIN SER-MCNC: 4.2 G/L
GLUCOSE SERPL-MCNC: 83 MG/DL (ref 74–106)
HCT VFR BLD CALC: 33.9 % (ref 42–54)
HCV AB S/CO SERPL IA: 0.1 S/CO RAT (ref 0–0.9)
HGB BLD-MCNC: 10.6 G/DL (ref 13.5–17.5)
IMM GRANULOCYTES NFR BLD: 0 % (ref 0–5)
INR PPP: 1.56 (ref 0.85–1.17)
LYMPHOCYTES # BLD: 1.1 10X3/UL (ref 1.32–3.57)
LYMPHOCYTES NFR BLD AUTO: 31.1 % (ref 15–50)
MAGNESIUM SERPL-MCNC: 1.8 MG/DL (ref 1.8–2.4)
MCH RBC QN AUTO: 22.3 PG (ref 26–34)
MCHC RBC AUTO-ENTMCNC: 31.3 G/DL (ref 31–37)
MCV RBC: 71.2 FL (ref 80–100)
MONOCYTES NFR BLD: 16.9 % (ref 2–11)
NEUTROPHILS # BLD: 1.6 10X3/UL (ref 1.78–5.38)
NEUTROPHILS NFR BLD AUTO: 45.3 % (ref 40–80)
OSMOLALITY SERPL CALC.SUM OF ELEC: 276 MOSM/KG (ref 275–300)
PHOSPHATE SERPL-MCNC: 4.6 MG/DL (ref 2.5–4.9)
PLATELET # BLD: 214 10X3/UL (ref 130–400)
PMV BLD AUTO: 10.2 FL (ref 7.4–10.4)
POTASSIUM SERPL-SCNC: 4.3 MMOL/L (ref 3.5–5.1)
PROT SERPL-MCNC: 6.6 G/DL (ref 6.4–8.2)
PROTHROMBIN TIME: 17.3 SECONDS (ref 11.6–15)
RBC # BLD AUTO: 4.76 10X6/UL (ref 4.2–6.1)
SODIUM SERPL-SCNC: 140 MMOL/L (ref 136–145)
WBC # BLD AUTO: 3.5 10X3/UL (ref 4.8–10.8)

## 2021-05-04 NOTE — NUR
PATIENT ASKED THAT I CALL [phone] (HEMA) AND ASK THEM TO COME GET IM FOR
RIDE AS THIS PERSON IS NOT ANSWERING WHEN MR BOGGS CALLS. I LEFT A
VOICEMAIL.

## 2021-05-04 NOTE — NUR
Nutrition Reassessment/Follow-up:
Eating well. Noted plans to d/c soon.
Diet: Cardiac Carb Consistent
PO intake: 98% avg x 10 meals
No new wt; last wt: 195# (5/1)
Labs noted: Glu 83, Alb 2.4
Meds noted: Bumex, Lactulose, Humalog, electrolyte protocol
-Nutrition needs unchanged since initial assessment; no new wt available.
-RD will follow up within 7 days if pt still admitted.

## 2021-05-04 NOTE — NUR
AM ROUNDING DONE WITH PATIENT ASKING FOR MORE CRANBERRY JUICE. 2 CONTAINERS
GIVEN. ON ROOM AIR AT THIS TIME. ON HEART MONITOR. RIGHT HAND PIV SALINE LOCK
SEEN. REFUSED TO WEAR SCD, RE-EDUCATED. ON ORAL BUMEX AND PAIN MEDS AS NNEDED
AS ORDERED. CALL LIGHT INUSE.

## 2021-05-04 NOTE — MORECARE
CASE MANAGEMENT DISCHARGE SUMMARY
 
 
PATIENT: RYANNE BOGGS                   UNIT: [id number]
ACCOUNT#: [id number]                       ADM DATE: 21
AGE: 45     : [birth year]  SEX: M            ROOM/BED: D.6816    
AUTHOR: CUONGDOC                             PHYSICIAN:                               
 
REFERRING PHYSICIAN: APOLINAR CABRAL MD                
DATE OF SERVICE: 21
Case Management Discharge Planning Summary
 
 
COMMENTS 
ENTERED DATE:  21  15:44 CT
COMMENT TYPE:  Discharge Planning
REVIEWER: Carmen Snider
DC PLAN: Home  
ANTICIPATED DC NEEDS: No needs  
  
CM met with patient to complete initial dc planning assessment.  CM educated 
patient on the CM role and verbal consent given by patient to complete 
assessment.  CM verified patient's address, phone number, and emergency 
contact phone numbers.  States he lives at [address] [place] [address] on [facility]. States he is independent with his care. States his brother 
is coming to pick him up. I questioned him about his home health that I set 
up for him last admission and he states "it just didn't work out. Declines 
home health. Discharging home today. Declines needs.
 
 
DCP REVIEW SUMMARY
ANTICIPATED D/C DATE: 2021
EXPECTED LOS : 3
CASE STATUS:  DCP Initiated
INITIAL REVIEW:  2021
INITIAL REVIEWER:   Carmen Snider
FINAL DISCHARGE DISPOSITION:  : 
FINAL REVIEWER:  
FINAL REVIEW DATE: 
 
  
 
DCP Focus Questions & Answers
 
DCP Evaluation
QUESTION: ANSWER
Patient gives permission to discuss discharge plans with:  (name, 
relationship and number) : Sky santiago - [phone]
Patient's ability to cope with chronic illness : d. No chronic illness
Patient's current cognitive status: : *Oriented to person, place, situation, 
 
time and present
Physical Status: : Independent with ADL's
Family / Caregiver's ability to cope with chronic illness: : c. Inadequate 
(Enables pt. to make bad choices, cannot meet pt's. needs, difficult family 
dynamics)
Functional screen assessment: : No issues identified
Living Arrangements: : Home Alone with No Support
Baseline cognitive status: : *Oriented to person, place, situation, time and 
present
Results of this evaluation have been discussed with: : Patient
Medication Management: : Patient states can afford medications
Pharmacy name(s): : [facility] [address]  No PCP
 
Does Patient have transportation to get home and to follow-up medical 
appointments when discharged from the hospital? : Yes
Would patient like to participate in any Care Coordination programs (if 
applicable): : Not applicable
Equipment in use: : CPAP
Equipment in use: : Home Oxygen with Nasal Cannula
Equipment in use: : Other
Other Equipment comments: : Portable oxygen
Mental health screen: : No mental health history
Psychosocial status: : Adult with physical limitations
 
 
 
DCP Re-evaluation
QUESTION: ANSWER
Would patient like to participate in any Care Coordination programs (if 
applicable): : Not applicable
 
 
 
 
 
 
 
PATIENT:  RYANNE BOGGS
ENCOUNTER:  [id number]
MEDICAL RECORD#:  [id number]
ADMISSION DATE:  2021
DISCHARGE DATE:  
ATTENDING MD:  APOLINAR MUJICA
:   1975-Dec-23
AGE:  45
MARITAL STATUS:   S
DC PLAN ID:  [id number]
 
FACILITY:   [facility]
PRINTED ON: 21  15:52  CT
 
 
 
 
 
 
 
 
**All edits/amendments must be made on the electronic document**
 
DICTATION DATE: 21     : SAWRAT  21     
RPT#: [id number]                                DC DATE:        
                                               STATUS: ADM IN  
[facility]
 [place] [address]
***END OF REPORT***

## 2021-05-04 NOTE — NUR
SALINE LOCK REMOVED WITH TIP INTACT. HEART MONITOR TURNED IN. VERBAL AND
WRITTEN DISCHARGE INSTRUCTIONS GIVEN TO PATIENT. AWAITING RIDE TO SHOW UP.

## 2021-05-19 ENCOUNTER — HOSPITAL ENCOUNTER (INPATIENT)
Dept: HOSPITAL 84 - D.ER | Age: 46
LOS: 5 days | Discharge: HOME | DRG: 293 | End: 2021-05-24
Attending: FAMILY MEDICINE | Admitting: FAMILY MEDICINE
Payer: MEDICARE

## 2021-05-19 VITALS — DIASTOLIC BLOOD PRESSURE: 86 MMHG | SYSTOLIC BLOOD PRESSURE: 107 MMHG

## 2021-05-19 VITALS — SYSTOLIC BLOOD PRESSURE: 99 MMHG | DIASTOLIC BLOOD PRESSURE: 59 MMHG

## 2021-05-19 VITALS — SYSTOLIC BLOOD PRESSURE: 101 MMHG | DIASTOLIC BLOOD PRESSURE: 73 MMHG

## 2021-05-19 VITALS — DIASTOLIC BLOOD PRESSURE: 65 MMHG | SYSTOLIC BLOOD PRESSURE: 104 MMHG

## 2021-05-19 VITALS — WEIGHT: 159.13 LBS | BODY MASS INDEX: 22.78 KG/M2 | HEIGHT: 70 IN

## 2021-05-19 VITALS — DIASTOLIC BLOOD PRESSURE: 63 MMHG | SYSTOLIC BLOOD PRESSURE: 96 MMHG

## 2021-05-19 VITALS — DIASTOLIC BLOOD PRESSURE: 78 MMHG | SYSTOLIC BLOOD PRESSURE: 115 MMHG

## 2021-05-19 DIAGNOSIS — E11.65: ICD-10-CM

## 2021-05-19 DIAGNOSIS — Z91.14: ICD-10-CM

## 2021-05-19 DIAGNOSIS — I11.0: Primary | ICD-10-CM

## 2021-05-19 DIAGNOSIS — F15.10: ICD-10-CM

## 2021-05-19 DIAGNOSIS — D50.9: ICD-10-CM

## 2021-05-19 DIAGNOSIS — I42.8: ICD-10-CM

## 2021-05-19 DIAGNOSIS — I50.23: ICD-10-CM

## 2021-05-19 DIAGNOSIS — E87.6: ICD-10-CM

## 2021-05-19 DIAGNOSIS — I95.9: ICD-10-CM

## 2021-05-19 LAB
ALBUMIN SERPL-MCNC: 2.7 G/DL (ref 3.4–5)
ALP SERPL-CCNC: 95 U/L (ref 30–120)
ALT SERPL-CCNC: 18 U/L (ref 10–68)
ANION GAP SERPL CALC-SCNC: 15.3 MMOL/L (ref 8–16)
APTT BLD: 35.5 SECONDS (ref 22.8–39.4)
BASOPHILS NFR BLD AUTO: 2.1 % (ref 0–2)
BILIRUB SERPL-MCNC: 0.74 MG/DL (ref 0.2–1.3)
BUN SERPL-MCNC: 14 MG/DL (ref 7–18)
CALCIUM SERPL-MCNC: 8.5 MG/DL (ref 8.5–10.1)
CHLORIDE SERPL-SCNC: 109 MMOL/L (ref 98–107)
CK MB SERPL-MCNC: 2.4 U/L (ref 0–3.6)
CK SERPL-CCNC: 164 UL (ref 21–232)
CO2 SERPL-SCNC: 22.8 MMOL/L (ref 21–32)
CREAT SERPL-MCNC: 0.9 MG/DL (ref 0.6–1.3)
EOSINOPHIL NFR BLD: 5.5 % (ref 0–7)
ERYTHROCYTE [DISTWIDTH] IN BLOOD BY AUTOMATED COUNT: 19.3 % (ref 11.5–14.5)
GLOBULIN SER-MCNC: 4.1 G/L
GLUCOSE SERPL-MCNC: 91 MG/DL (ref 74–106)
HCT VFR BLD CALC: 34.6 % (ref 42–54)
HGB BLD-MCNC: 10.9 G/DL (ref 13.5–17.5)
INR PPP: 1.53 (ref 0.85–1.17)
LYMPHOCYTES # BLD: 0.9 10X3/UL (ref 1.32–3.57)
LYMPHOCYTES NFR BLD AUTO: 23.9 % (ref 15–50)
MAGNESIUM SERPL-MCNC: 1.8 MG/DL (ref 1.8–2.4)
MCH RBC QN AUTO: 22.3 PG (ref 26–34)
MCHC RBC AUTO-ENTMCNC: 31.5 G/DL (ref 31–37)
MCV RBC: 70.7 FL (ref 80–100)
MONOCYTES NFR BLD: 11.8 % (ref 2–11)
NEUTROPHILS # BLD: 2.2 10X3/UL (ref 1.78–5.38)
NEUTROPHILS NFR BLD AUTO: 56.7 % (ref 40–80)
OSMOLALITY SERPL CALC.SUM OF ELEC: 285 MOSM/KG (ref 275–300)
PLATELET # BLD: 226 10X3/UL (ref 130–400)
PMV BLD AUTO: 8.9 FL (ref 7.4–10.4)
POTASSIUM SERPL-SCNC: 4.1 MMOL/L (ref 3.5–5.1)
PROT SERPL-MCNC: 6.8 G/DL (ref 6.4–8.2)
PROTHROMBIN TIME: 17.1 SECONDS (ref 11.6–15)
RBC # BLD AUTO: 4.89 10X6/UL (ref 4.2–6.1)
SODIUM SERPL-SCNC: 143 MMOL/L (ref 136–145)
TROPONIN I SERPL-MCNC: < 0.017 NG/ML (ref 0–0.06)
WBC # BLD AUTO: 3.8 10X3/UL (ref 4.8–10.8)

## 2021-05-20 VITALS — SYSTOLIC BLOOD PRESSURE: 96 MMHG | DIASTOLIC BLOOD PRESSURE: 59 MMHG

## 2021-05-20 VITALS — SYSTOLIC BLOOD PRESSURE: 90 MMHG | DIASTOLIC BLOOD PRESSURE: 51 MMHG

## 2021-05-20 VITALS — DIASTOLIC BLOOD PRESSURE: 50 MMHG | SYSTOLIC BLOOD PRESSURE: 82 MMHG

## 2021-05-20 VITALS — SYSTOLIC BLOOD PRESSURE: 96 MMHG | DIASTOLIC BLOOD PRESSURE: 67 MMHG

## 2021-05-20 VITALS — DIASTOLIC BLOOD PRESSURE: 68 MMHG | SYSTOLIC BLOOD PRESSURE: 100 MMHG

## 2021-05-20 VITALS — SYSTOLIC BLOOD PRESSURE: 94 MMHG | DIASTOLIC BLOOD PRESSURE: 56 MMHG

## 2021-05-20 LAB
ALBUMIN SERPL-MCNC: 2.6 G/DL (ref 3.4–5)
ALP SERPL-CCNC: 103 U/L (ref 30–120)
ALT SERPL-CCNC: 18 U/L (ref 10–68)
ANION GAP SERPL CALC-SCNC: 11 MMOL/L (ref 8–16)
BASOPHILS NFR BLD AUTO: 1.5 % (ref 0–2)
BILIRUB SERPL-MCNC: 0.73 MG/DL (ref 0.2–1.3)
BUN SERPL-MCNC: 16 MG/DL (ref 7–18)
CALCIUM SERPL-MCNC: 8.9 MG/DL (ref 8.5–10.1)
CHLORIDE SERPL-SCNC: 102 MMOL/L (ref 98–107)
CO2 SERPL-SCNC: 31.7 MMOL/L (ref 21–32)
CREAT SERPL-MCNC: 1.1 MG/DL (ref 0.6–1.3)
EOSINOPHIL NFR BLD: 4.9 % (ref 0–7)
ERYTHROCYTE [DISTWIDTH] IN BLOOD BY AUTOMATED COUNT: 19.1 % (ref 11.5–14.5)
GLOBULIN SER-MCNC: 4.2 G/L
GLUCOSE SERPL-MCNC: 113 MG/DL (ref 74–106)
HCT VFR BLD CALC: 38.5 % (ref 42–54)
HGB BLD-MCNC: 12 G/DL (ref 13.5–17.5)
LYMPHOCYTES # BLD: 0.6 10X3/UL (ref 1.32–3.57)
LYMPHOCYTES NFR BLD AUTO: 13.8 % (ref 15–50)
MAGNESIUM SERPL-MCNC: 1.6 MG/DL (ref 1.8–2.4)
MCH RBC QN AUTO: 21.8 PG (ref 26–34)
MCHC RBC AUTO-ENTMCNC: 31.2 G/DL (ref 31–37)
MCV RBC: 69.9 FL (ref 80–100)
MONOCYTES NFR BLD: 14.1 % (ref 2–11)
NEUTROPHILS # BLD: 2.9 10X3/UL (ref 1.78–5.38)
NEUTROPHILS NFR BLD AUTO: 65.7 % (ref 40–80)
OSMOLALITY SERPL CALC.SUM OF ELEC: 282 MOSM/KG (ref 275–300)
PLATELET # BLD: 253 10X3/UL (ref 130–400)
PMV BLD AUTO: 9 FL (ref 7.4–10.4)
POTASSIUM SERPL-SCNC: 3.7 MMOL/L (ref 3.5–5.1)
PROT SERPL-MCNC: 6.8 G/DL (ref 6.4–8.2)
RBC # BLD AUTO: 5.51 10X6/UL (ref 4.2–6.1)
SODIUM SERPL-SCNC: 141 MMOL/L (ref 136–145)
WBC # BLD AUTO: 4.4 10X3/UL (ref 4.8–10.8)

## 2021-05-21 VITALS — SYSTOLIC BLOOD PRESSURE: 81 MMHG | DIASTOLIC BLOOD PRESSURE: 50 MMHG

## 2021-05-21 VITALS — DIASTOLIC BLOOD PRESSURE: 53 MMHG | SYSTOLIC BLOOD PRESSURE: 89 MMHG

## 2021-05-21 VITALS — DIASTOLIC BLOOD PRESSURE: 44 MMHG | SYSTOLIC BLOOD PRESSURE: 85 MMHG

## 2021-05-21 VITALS — SYSTOLIC BLOOD PRESSURE: 95 MMHG | DIASTOLIC BLOOD PRESSURE: 54 MMHG

## 2021-05-21 LAB
ALBUMIN SERPL-MCNC: 2.5 G/DL (ref 3.4–5)
ALP SERPL-CCNC: 100 U/L (ref 30–120)
ALT SERPL-CCNC: 16 U/L (ref 10–68)
ANION GAP SERPL CALC-SCNC: 10 MMOL/L (ref 8–16)
BASOPHILS NFR BLD AUTO: 1.2 % (ref 0–2)
BILIRUB SERPL-MCNC: 0.7 MG/DL (ref 0.2–1.3)
BUN SERPL-MCNC: 13 MG/DL (ref 7–18)
CALCIUM SERPL-MCNC: 8.8 MG/DL (ref 8.5–10.1)
CHLORIDE SERPL-SCNC: 97 MMOL/L (ref 98–107)
CO2 SERPL-SCNC: 31.9 MMOL/L (ref 21–32)
CREAT SERPL-MCNC: 1.2 MG/DL (ref 0.6–1.3)
EOSINOPHIL NFR BLD: 7.3 % (ref 0–7)
ERYTHROCYTE [DISTWIDTH] IN BLOOD BY AUTOMATED COUNT: 19.5 % (ref 11.5–14.5)
GLOBULIN SER-MCNC: 4.4 G/L
GLUCOSE SERPL-MCNC: 113 MG/DL (ref 74–106)
HCT VFR BLD CALC: 41.3 % (ref 42–54)
HGB BLD-MCNC: 12.9 G/DL (ref 13.5–17.5)
LYMPHOCYTES # BLD: 1 10X3/UL (ref 1.32–3.57)
LYMPHOCYTES NFR BLD AUTO: 22.2 % (ref 15–50)
MAGNESIUM SERPL-MCNC: 1.5 MG/DL (ref 1.8–2.4)
MCH RBC QN AUTO: 21.8 PG (ref 26–34)
MCHC RBC AUTO-ENTMCNC: 31.3 G/DL (ref 31–37)
MCV RBC: 69.6 FL (ref 80–100)
MONOCYTES NFR BLD: 18.6 % (ref 2–11)
NEUTROPHILS # BLD: 2.2 10X3/UL (ref 1.78–5.38)
NEUTROPHILS NFR BLD AUTO: 50.7 % (ref 40–80)
OSMOLALITY SERPL CALC.SUM OF ELEC: 270 MOSM/KG (ref 275–300)
PLATELET # BLD: 280 10X3/UL (ref 130–400)
PMV BLD AUTO: 8.6 FL (ref 7.4–10.4)
POTASSIUM SERPL-SCNC: 3.9 MMOL/L (ref 3.5–5.1)
PROT SERPL-MCNC: 6.9 G/DL (ref 6.4–8.2)
RBC # BLD AUTO: 5.93 10X6/UL (ref 4.2–6.1)
SODIUM SERPL-SCNC: 135 MMOL/L (ref 136–145)
WBC # BLD AUTO: 4.4 10X3/UL (ref 4.8–10.8)

## 2021-05-21 NOTE — NUR
REPORT RECEIVED, PT CARE ASSUMED. PT LYING IN BED, EYES CLOSED, RR EVEN AND
NONLABORED, NAD, AROUSES EASILY TO VOICE. AAOX4, REQUESTED TURKEY SANDWICH,
PROVIDED. DENIES ANY OTHER NEEDS AT THIS TIME. BED LOWEST, SRX1, CL AND
URINAL WITHIN REACH. CPOC.

## 2021-05-21 NOTE — NUR
C/O BILATERAL LEG PAIN OF 10, ON A SCALE OF 0-10. PRN NORCO AND PM MEDS
ADMINISTERED, PER ORDERS. PT REFUSED SCHEDULED LACTULOSE, REPORTS VOMITING
AFTER DOSE EARLIER THIS AM. 1400 ML DARK YELLOW URINE EMPTIED FROM URINALS.

## 2021-05-21 NOTE — NUR
PT COMPLAINS OF SORE BILAT THIGHS. TERESITA CELAYA PAGED WITH NEW ORDERS FOR
LIDOCAIN PATCH AMD TO ESTABLISH ELECTROLYTE PROTOCAL. PT A/O X4 RR E/U, VSS.
NO S/S OF DISTRESS AT THIS TIME. BED LOW CALL LIGHT WITHIN REACH. WILL
CONTINUE TO MONITOR.

## 2021-05-21 NOTE — NUR
Lying in bed, awake/alert/oriented, T/R self ad neville, cont of B/B with BRPs per
self ad neville, c/o cramping pain in BLE rated 5/10, call light/phone/water
within reach, no s/s of acute distress observed.

## 2021-05-22 VITALS — SYSTOLIC BLOOD PRESSURE: 87 MMHG | DIASTOLIC BLOOD PRESSURE: 60 MMHG

## 2021-05-22 VITALS — DIASTOLIC BLOOD PRESSURE: 46 MMHG | SYSTOLIC BLOOD PRESSURE: 72 MMHG

## 2021-05-22 VITALS — DIASTOLIC BLOOD PRESSURE: 52 MMHG | SYSTOLIC BLOOD PRESSURE: 83 MMHG

## 2021-05-22 VITALS — SYSTOLIC BLOOD PRESSURE: 86 MMHG | DIASTOLIC BLOOD PRESSURE: 54 MMHG

## 2021-05-22 VITALS — DIASTOLIC BLOOD PRESSURE: 58 MMHG | SYSTOLIC BLOOD PRESSURE: 87 MMHG

## 2021-05-22 LAB
ALBUMIN SERPL-MCNC: 2.5 G/DL (ref 3.4–5)
ALP SERPL-CCNC: 99 U/L (ref 30–120)
ALT SERPL-CCNC: 20 U/L (ref 10–68)
ANION GAP SERPL CALC-SCNC: 11.3 MMOL/L (ref 8–16)
BASOPHILS NFR BLD AUTO: 1.7 % (ref 0–2)
BILIRUB SERPL-MCNC: 0.53 MG/DL (ref 0.2–1.3)
BUN SERPL-MCNC: 12 MG/DL (ref 7–18)
CALCIUM SERPL-MCNC: 8.7 MG/DL (ref 8.5–10.1)
CHLORIDE SERPL-SCNC: 99 MMOL/L (ref 98–107)
CO2 SERPL-SCNC: 27.4 MMOL/L (ref 21–32)
CREAT SERPL-MCNC: 1.1 MG/DL (ref 0.6–1.3)
EOSINOPHIL NFR BLD: 6.3 % (ref 0–7)
ERYTHROCYTE [DISTWIDTH] IN BLOOD BY AUTOMATED COUNT: 19.2 % (ref 11.5–14.5)
GLOBULIN SER-MCNC: 4.1 G/L
GLUCOSE SERPL-MCNC: 96 MG/DL (ref 74–106)
HCT VFR BLD CALC: 42.8 % (ref 42–54)
HGB BLD-MCNC: 13.4 G/DL (ref 13.5–17.5)
LYMPHOCYTES # BLD: 1.2 10X3/UL (ref 1.32–3.57)
LYMPHOCYTES NFR BLD AUTO: 27.9 % (ref 15–50)
MAGNESIUM SERPL-MCNC: 1.7 MG/DL (ref 1.8–2.4)
MCH RBC QN AUTO: 21.9 PG (ref 26–34)
MCHC RBC AUTO-ENTMCNC: 31.3 G/DL (ref 31–37)
MCV RBC: 70 FL (ref 80–100)
MONOCYTES NFR BLD: 19.5 % (ref 2–11)
NEUTROPHILS # BLD: 1.9 10X3/UL (ref 1.78–5.38)
NEUTROPHILS NFR BLD AUTO: 44.6 % (ref 40–80)
OSMOLALITY SERPL CALC.SUM OF ELEC: 265 MOSM/KG (ref 275–300)
PLATELET # BLD: 284 10X3/UL (ref 130–400)
PMV BLD AUTO: 8.8 FL (ref 7.4–10.4)
POTASSIUM SERPL-SCNC: 4.7 MMOL/L (ref 3.5–5.1)
PROT SERPL-MCNC: 6.6 G/DL (ref 6.4–8.2)
RBC # BLD AUTO: 6.11 10X6/UL (ref 4.2–6.1)
SODIUM SERPL-SCNC: 133 MMOL/L (ref 136–145)
WBC # BLD AUTO: 4.2 10X3/UL (ref 4.8–10.8)

## 2021-05-22 NOTE — NUR
REPORT RECEIVED. PATIENT IS AAOX4, LYING IN BED. NO S/S OF DISTRESS OBSERVED,
RR EVEN AND UNLABORED ON ROOM AIR. PIV TO RT HAND, PATENT, SL. PATIENT DENIES
NEEDS AT THIS TIME. CL IN REACH, BED LOCKED AND LOWERED. WILL CPOC.

## 2021-05-23 VITALS — DIASTOLIC BLOOD PRESSURE: 48 MMHG | SYSTOLIC BLOOD PRESSURE: 89 MMHG

## 2021-05-23 VITALS — SYSTOLIC BLOOD PRESSURE: 94 MMHG | DIASTOLIC BLOOD PRESSURE: 62 MMHG

## 2021-05-23 VITALS — DIASTOLIC BLOOD PRESSURE: 52 MMHG | SYSTOLIC BLOOD PRESSURE: 80 MMHG

## 2021-05-23 VITALS — DIASTOLIC BLOOD PRESSURE: 51 MMHG | SYSTOLIC BLOOD PRESSURE: 84 MMHG

## 2021-05-23 VITALS — SYSTOLIC BLOOD PRESSURE: 78 MMHG | DIASTOLIC BLOOD PRESSURE: 46 MMHG

## 2021-05-23 VITALS — DIASTOLIC BLOOD PRESSURE: 46 MMHG | SYSTOLIC BLOOD PRESSURE: 82 MMHG

## 2021-05-23 VITALS — DIASTOLIC BLOOD PRESSURE: 54 MMHG | SYSTOLIC BLOOD PRESSURE: 96 MMHG

## 2021-05-23 LAB
ALBUMIN SERPL-MCNC: 2.3 G/DL (ref 3.4–5)
ALP SERPL-CCNC: 92 U/L (ref 30–120)
ALT SERPL-CCNC: 16 U/L (ref 10–68)
ANION GAP SERPL CALC-SCNC: 11.4 MMOL/L (ref 8–16)
ANISOCYTOSIS BLD QL SMEAR: (no result)
BILIRUB SERPL-MCNC: 0.34 MG/DL (ref 0.2–1.3)
BUN SERPL-MCNC: 16 MG/DL (ref 7–18)
CALCIUM SERPL-MCNC: 8.5 MG/DL (ref 8.5–10.1)
CHLORIDE SERPL-SCNC: 102 MMOL/L (ref 98–107)
CO2 SERPL-SCNC: 24 MMOL/L (ref 21–32)
CREAT SERPL-MCNC: 0.9 MG/DL (ref 0.6–1.3)
EOSINOPHIL NFR BLD: 11 % (ref 0–7)
ERYTHROCYTE [DISTWIDTH] IN BLOOD BY AUTOMATED COUNT: 19.5 % (ref 11.5–14.5)
GLOBULIN SER-MCNC: 4.2 G/L
GLUCOSE SERPL-MCNC: 108 MG/DL (ref 74–106)
HCT VFR BLD CALC: 42 % (ref 42–54)
HGB BLD-MCNC: 13 G/DL (ref 13.5–17.5)
LYMPHOCYTES # BLD: 0.9 10X3/UL (ref 1.32–3.57)
LYMPHOCYTES NFR BLD AUTO: 35 % (ref 15–50)
MAGNESIUM SERPL-MCNC: 1.8 MG/DL (ref 1.8–2.4)
MCH RBC QN AUTO: 21.7 PG (ref 26–34)
MCHC RBC AUTO-ENTMCNC: 31.1 G/DL (ref 31–37)
MCV RBC: 69.7 FL (ref 80–100)
MONOCYTES NFR BLD: 11 % (ref 2–11)
NEUTROPHILS # BLD: 1.8 10X3/UL (ref 1.78–5.38)
NEUTROPHILS NFR BLD AUTO: 41 % (ref 40–80)
OSMOLALITY SERPL CALC.SUM OF ELEC: 267 MOSM/KG (ref 275–300)
PLAT MORPH BLD: (no result)
PLATELET # BLD EST: NORMAL 10*3/UL
PLATELET # BLD: 251 10X3/UL (ref 130–400)
PMV BLD AUTO: 8.8 FL (ref 7.4–10.4)
POTASSIUM SERPL-SCNC: 4.4 MMOL/L (ref 3.5–5.1)
PROT SERPL-MCNC: 6.5 G/DL (ref 6.4–8.2)
RBC # BLD AUTO: 6.02 10X6/UL (ref 4.2–6.1)
SCHISTOCYTES BLD QL SMEAR: (no result)
SODIUM SERPL-SCNC: 133 MMOL/L (ref 136–145)
WBC # BLD AUTO: 4 10X3/UL (ref 4.8–10.8)

## 2021-05-23 NOTE — MORECARE
CASE MANAGEMENT DISCHARGE SUMMARY
 
 
PATIENT: RYANNE BOGGS                   UNIT: [id number]
ACCOUNT#: [id number]                       ADM DATE: 21
AGE: 45     : [birth year]  SEX: M            ROOM/BED: D.2523    
AUTHOR: BRYCE HUTCHINS                             PHYSICIAN:                               
 
REFERRING PHYSICIAN: ZURDO NICKERSON MD           
DATE OF SERVICE: 21
Case Management Discharge Planning Summary
 
 
COMMENTS 
ENTERED DATE:  21  19:30 CT
COMMENT TYPE:  Discharge Planning
REVIEWER: Mauro Narvaez
CM met with patient to complete DC plan and to evaluate needs.  Patient 
stated that he readmitted because %%%.  Patient stated that he was 
able/unable to obtain his medications after last discharge but unfortunately 
was not able to keep his follow up appointment.  Patient stated that he 
followed the dc instructions given to him.  It appears that this readmission 
was due to Exacerbation of his chronic condition of CHF and noncompliance 
with prescribed treatment regimen.  Patient lives alone, independently, with 
family support through his brother, Sky Lynn, [phone].  Patient 
stated that his home is safe and has electricity and running water.  Patient 
stated that he has no problems paying for medications and he fills his 
medications at [facility].  CM discussed availability of home health, rehab 
services, and medical equipment.  Patient declined HHS, SNF, and DME but 
would like Inpatient rehab through [facility].  WILNER signed and placed in chart.  
Patient voiced no other needs at this time and is satisfied with DC plan.  
Transportation provider at discharge will be with Sky, his brother.  DC 
IMM delivered, explained, signed by the patient, and placed in chart.  
Signed form also left with the patient.  CM will continue to follow and will 
assist as needed with dc plans/needs.  
  
Appended by Mauro Narvaez on 2021 19:33 CDT:  
Correction to beginning of documentation:  CM met with patient to complete 
DC plan and to evaluate needs.  Patient stated that he readmitted because he 
"...felt hot and couldn't breath".  Patient stated that he was unable to 
obtain his medications and was not able to keep his follow up appointment.
 
 
DCP REVIEW SUMMARY
ANTICIPATED D/C DATE: 
EXPECTED LOS : 
CASE STATUS:  DCP Initiated
INITIAL REVIEW:  2021
INITIAL REVIEWER:   Mauro Narvaez
FINAL DISCHARGE DISPOSITION:  : 
 
FINAL REVIEWER:  
FINAL REVIEW DATE: 
 
  
 
DCP Focus Questions & Answers
 
DCP Evaluation
QUESTION: ANSWER
Patient and/or caregiver agree upon recommended discharge plan? : Yes
Family / Caregiver's ability to cope with chronic illness: : a. Adequate 
(ability to meet patient's medical needs, ensures patient attends medical 
appts.)
Patient's current cognitive status: : *Oriented to person, place, situation, 
time and present
Patient's ability to cope with chronic illness : d. No chronic illness
Patient gives permission to discuss discharge plans with:  (name, 
relationship and number) : Sky santiago, [phone]
Does the patient have the ability to pay for or attain post discharge needs 
/ services? : Yes
Functional screen assessment: : Basic needs can adequately be met by self
Family / Caregiver's ability to cope with chronic illness: : a. Adequate 
(ability to meet patient's medical needs, ensures patient attends medical 
appts.)
Physical Status: : Independent with ADL's
Equipment needed for post hospitalization: : None
Is there a likelihood that the patient will require additional services to 
return to the preadmission environment? : No
Living Arrangements: : Home Alone with Support
Patient with capacity for self-care or can be cared for in same environment 
as prior to hospitalization? : Yes
Baseline cognitive status: : *Oriented to person, place, situation, time and 
present
Physical environment modification needed / anticipated for discharge: : No
Medication Management: : Patient states can read and understand medication 
labels
Medication Management: : Patient states can afford medications
Ph[facility] : [facility]
Does Patient have transportation to get home and to follow-up medical 
appointments when discharged from the hospital? : Yes
Would patient like to participate in any Care Coordination programs (if 
applicable): : Not applicable
Does the patient have electricity at home? : Yes
Does the patient have running water in their house? : Yes
Equipment in use: : Other
Other Equipment comments: : OXYGEN TANKS AND SUPPLIES
Equipment agency name and contact information: : [facility]
Mental health screen: : No mental health history
 
 
 
DCP Re-evaluation
 
QUESTION: ANSWER
Would patient like to participate in any Care Coordination programs (if 
applicable): : Not applicable
 
 
 
 
 
 
 
PATIENT:  RYANNE BOGGS
ENCOUNTER:  [id number]
MEDICAL RECORD#:  [id number]
ADMISSION DATE:  2021
DISCHARGE DATE:  
ATTENDING MD:  ZURDO FOSTER
:   1975-Dec-23
AGE:  45
MARITAL STATUS:   S
DC PLAN ID:  [id number]
FACILITY:   [facility]
PRINTED ON: 21  19:35  CT
 
 
 
 
 
 
 
 
**All edits/amendments must be made on the electronic document**
 
DICTATION DATE: 21     : SARWAT  21     
RPT#: [id number]                                DC DATE:        
                                               STATUS: ADM IN  
[facility]
 [place] [address]
***END OF REPORT***

## 2021-05-23 NOTE — NUR
REHAB PRESCREENING
Rehab referral received and chart reviewed.  PT has not been consulted as of
yet.  Rehab will continue to follow this patient for PT evaluation in order to
assess admission criteria.
Thank you for this referral!
Amanda Aguilar, [facility] PD

## 2021-05-24 VITALS — SYSTOLIC BLOOD PRESSURE: 86 MMHG | DIASTOLIC BLOOD PRESSURE: 56 MMHG

## 2021-05-24 VITALS — DIASTOLIC BLOOD PRESSURE: 42 MMHG | SYSTOLIC BLOOD PRESSURE: 100 MMHG

## 2021-05-24 VITALS — SYSTOLIC BLOOD PRESSURE: 90 MMHG | DIASTOLIC BLOOD PRESSURE: 41 MMHG

## 2021-05-24 VITALS — DIASTOLIC BLOOD PRESSURE: 101 MMHG | SYSTOLIC BLOOD PRESSURE: 136 MMHG

## 2021-05-24 LAB
ALBUMIN SERPL-MCNC: 2.5 G/DL (ref 3.4–5)
ALP SERPL-CCNC: 95 U/L (ref 30–120)
ALT SERPL-CCNC: 30 U/L (ref 10–68)
ANION GAP SERPL CALC-SCNC: 15.5 MMOL/L (ref 8–16)
BASOPHILS NFR BLD AUTO: 1.9 % (ref 0–2)
BILIRUB SERPL-MCNC: 0.38 MG/DL (ref 0.2–1.3)
BUN SERPL-MCNC: 13 MG/DL (ref 7–18)
CALCIUM SERPL-MCNC: 8.5 MG/DL (ref 8.5–10.1)
CHLORIDE SERPL-SCNC: 105 MMOL/L (ref 98–107)
CO2 SERPL-SCNC: 20.5 MMOL/L (ref 21–32)
CREAT SERPL-MCNC: 0.9 MG/DL (ref 0.6–1.3)
EOSINOPHIL NFR BLD: 8.5 % (ref 0–7)
ERYTHROCYTE [DISTWIDTH] IN BLOOD BY AUTOMATED COUNT: 19 % (ref 11.5–14.5)
GLOBULIN SER-MCNC: 4 G/L
GLUCOSE SERPL-MCNC: 109 MG/DL (ref 74–106)
HCT VFR BLD CALC: 41.4 % (ref 42–54)
HGB BLD-MCNC: 12.9 G/DL (ref 13.5–17.5)
LYMPHOCYTES NFR BLD AUTO: 26.5 % (ref 15–50)
MAGNESIUM SERPL-MCNC: 1.8 MG/DL (ref 1.8–2.4)
MCH RBC QN AUTO: 21.9 PG (ref 26–34)
MCHC RBC AUTO-ENTMCNC: 31.3 G/DL (ref 31–37)
MCV RBC: 70.2 FL (ref 80–100)
MONOCYTES NFR BLD: 17 % (ref 2–11)
NEUTROPHILS # BLD: 1.7 10X3/UL (ref 1.78–5.38)
NEUTROPHILS NFR BLD AUTO: 46.1 % (ref 40–80)
OSMOLALITY SERPL CALC.SUM OF ELEC: 272 MOSM/KG (ref 275–300)
PLATELET # BLD: 255 10X3/UL (ref 130–400)
PMV BLD AUTO: 8.8 FL (ref 7.4–10.4)
POTASSIUM SERPL-SCNC: 5 MMOL/L (ref 3.5–5.1)
PROT SERPL-MCNC: 6.5 G/DL (ref 6.4–8.2)
RBC # BLD AUTO: 5.9 10X6/UL (ref 4.2–6.1)
SODIUM SERPL-SCNC: 136 MMOL/L (ref 136–145)
WBC # BLD AUTO: 3.6 10X3/UL (ref 4.8–10.8)

## 2021-05-24 NOTE — NUR
REVIEWED CHART THIS AM. PATIENT STILL HAS NO THERAPIES ORDERED. I CALLED AND
SPOKE WITH HEMA MOSQUEDA RN CM AND SHE WILL WORK ON GETTING ORDERS. I WILL
RE-REVIEW THE CHART AFTER IDG TODAY.
THANK YOU FOR THIS REFERRAL.
BEAU FONG RN
CLINICAL LIAISON, INPATIENT REHAB.

## 2021-05-24 NOTE — MORECARE
CASE MANAGEMENT DISCHARGE SUMMARY
 
 
PATIENT: RYANNE BOGGS                   UNIT: [id number]
ACCOUNT#: [id number]                       ADM DATE: 21
AGE: 45     : [birth year]  SEX: M            ROOM/BED: D.2133    
AUTHOR: CUONG,DOC                             PHYSICIAN:                               
 
REFERRING PHYSICIAN: ZURDO NICKERSON MD           
DATE OF SERVICE: 21
Case Management Discharge Planning Summary
 
 
COMMENTS 
ENTERED DATE:  21  14:24 CT
COMMENT TYPE:  Discharge Planning
REVIEWER: Laila Quintanilla
CM met with patient today regarding denial of inpatient rehab. CM was 
informed this morning that patient does not meet criteria. Met with patient 
to assess alternate plan. Pt has apartment to return to and does not want 
placement in SNF, denies need for HH, states that he has his DME needs met. 
Pt states that he feels he can get his brother to obtain his medications and 
follow up appointments if he is in town. Spoke to patient about alternate 
plan if brother is unavailable, patient states that he has a cousin and 
friend that can take him. CM strongly suggested that patient  is 
medication and go to his  follow up appts in order to stay well. Pt states 
that he came back to hospital because his breathing was bad. CM encouraged 
patient to obtain his medication and keep his f/u appt with PCP. Pt 
verbalized understanding.
__________________________________________________
ENTERED DATE:  21  19:30 CT
COMMENT TYPE:  Discharge Planning
REVIEWER: Mauro Narvaez
CM met with patient to complete DC plan and to evaluate needs.  Patient 
stated that he readmitted because %%%.  Patient stated that he was 
able/unable to obtain his medications after last discharge but unfortunately 
was not able to keep his follow up appointment.  Patient stated that he 
followed the dc instructions given to him.  It appears that this readmission 
was due to Exacerbation of his chronic condition of CHF and noncompliance 
with prescribed treatment regimen.  Patient lives alone, independently, with 
family support through his brother, Sky Lynn, [phone].  Patient 
stated that his home is safe and has electricity and running water.  Patient 
stated that he has no problems paying for medications and he fills his 
medications at [facility].  CM discussed availability of home health, rehab 
services, and medical equipment.  Patient declined HHS, SNF, and DME but 
would like Inpatient rehab through [facility].  WILNER signed and placed in chart.  
Patient voiced no other needs at this time and is satisfied with DC plan.  
Transportation provider at discharge will be with Sky, his brother.  DC 
IMM delivered, explained, signed by the patient, and placed in chart.  
Signed form also left with the patient.  CM will continue to follow and will 
 
assist as needed with dc plans/needs.  
  
Appended by Mauro Narvaez on 2021 19:33 CDT:  
Correction to beginning of documentation:  CM met with patient to complete 
DC plan and to evaluate needs.  Patient stated that he readmitted because he 
"...felt hot and couldn't breath".  Patient stated that he was unable to 
obtain his medications and was not able to keep his follow up appointment.
 
 
DCP REVIEW SUMMARY
ANTICIPATED D/C DATE: 
EXPECTED LOS : 
CASE STATUS:  DCP Initiated
INITIAL REVIEW:  2021
INITIAL REVIEWER:   Mauro Narvaez
FINAL DISCHARGE DISPOSITION:  : 
FINAL REVIEWER:  
FINAL REVIEW DATE: 
 
  
 
DCP Focus Questions & Answers
 
DCP Evaluation
QUESTION: ANSWER
Patient and/or caregiver agree upon recommended discharge plan? : Yes
Family / Caregiver's ability to cope with chronic illness: : a. Adequate 
(ability to meet patient's medical needs, ensures patient attends medical 
appts.)
Patient's current cognitive status: : *Oriented to person, place, situation, 
time and present
Patient's ability to cope with chronic illness : d. No chronic illness
Patient gives permission to discuss discharge plans with:  (name, 
relationship and number) : brotherSky, [phone]
Does the patient have the ability to pay for or attain post discharge needs 
/ services? : Yes
Functional screen assessment: : Basic needs can adequately be met by self
Family / Caregiver's ability to cope with chronic illness: : a. Adequate 
(ability to meet patient's medical needs, ensures patient attends medical 
appts.)
Physical Status: : Independent with ADL's
Equipment needed for post hospitalization: : None
Is there a likelihood that the patient will require additional services to 
return to the preadmission environment? : No
Living Arrangements: : Home Alone with Support
Patient with capacity for self-care or can be cared for in same environment 
as prior to hospitalization? : Yes
Baseline cognitive status: : *Oriented to person, place, situation, time and 
present
Physical environment modification needed / anticipated for discharge: : No
Medication Management: : Patient states can read and understand medication 
labels
 
Medication Management: : Patient states can afford medications
Ph[facility]): : [facility]
Does Patient have transportation to get home and to follow-up medical 
appointments when discharged from the hospital? : Yes
Would patient like to participate in any Care Coordination programs (if 
applicable): : Not applicable
Does the patient have electricity at home? : Yes
Does the patient have running water in their house? : Yes
Equipment in use: : Other
Other Equipment comments: : [employer] SU[employer]IES
Equipment agency name and contact information: : [email or web address]
Mental health screen: : No mental health history
 
 
 
DCP Re-evaluation
QUESTION: ANSWER
Would patient like to participate in any Care Coordination programs (if 
applicable): : Not applicable
 
 
 
 
 
 
 
PATIENT:  RYANNE BOGGS
ENCOUNTER:  [id number]
MEDICAL RECORD#:  [id number]
ADMISSION DATE:  2021
DISCHARGE DATE:  
ATTENDING MD:  ZURDO FOSTER
:   1975-Dec-23
AGE:  45
MARITAL STATUS:   S
DC PLAN ID:  [id number]
 
FACILITY:   [facility]
PRINTED ON: 21  14:46  CT
 
 
 
 
 
 
 
 
**All edits/amendments must be made on the electronic document**
 
DICTATION DATE: 21     : SARWAT  21     
RPT#: [id number]                                DC DATE:        
                                               STATUS: ADM IN  
[facility]
 [place] [address]
***END OF REPORT***

## 2021-05-24 NOTE — NUR
PATIENT AAOX4 RESP EVEN AND NON LABORED, NO S/S OF DISTRESS, MEDICATIONS
ADMINISTERED WITH NO COMPLICATIONS, NO FURTHER NEEDS AT THIS TIME, CLIR, BLP

## 2021-05-24 NOTE — MORECARE
CASE MANAGEMENT DISCHARGE SUMMARY
 
 
PATIENT: RYANNE BOGGS                   UNIT: [id number]
ACCOUNT#: [id number]                       ADM DATE: 21
AGE: 45     : 77  SEX: M            ROOM/BED: D.2133    
AUTHOR: CUONG,DOC                             PHYSICIAN:                               
 
REFERRING PHYSICIAN: ZURDO NICKERSON MD           
DATE OF SERVICE: 21
Case Management Discharge Planning Summary
 
 
COMMENTS 
ENTERED DATE:  21  14:24 CT
COMMENT TYPE:  Discharge Planning
REVIEWER: Laila Quintanilla
CM met with patient today regarding denial of inpatient rehab. CM was 
informed this morning that patient does not meet criteria. Met with patient 
to assess alternate plan. Pt has apartment to return to and does not want 
placement in SNF, denies need for HH, states that he has his DME needs met. 
Pt states that he feels he can get his brother to obtain his medications and 
follow up appointments if he is in town. Spoke to patient about alternate 
plan if brother is unavailable, patient states that he has a cousin and 
friend that can take him. CM strongly suggested that patient  is 
medication and go to his  follow up appts in order to stay well. Pt states 
that he came back to hospital because his breathing was bad. CM encouraged 
patient to obtain his medication and keep his f/u appt with PCP. Pt 
verbalized understanding.
__________________________________________________
ENTERED DATE:  21  19:30 CT
COMMENT TYPE:  Discharge Planning
REVIEWER: Mauro Narvaez
CM met with patient to complete DC plan and to evaluate needs.  Patient 
stated that he readmitted because %%%.  Patient stated that he was 
able/unable to obtain his medications after last discharge but unfortunately 
was not able to keep his follow up appointment.  Patient stated that he 
followed the dc instructions given to him.  It appears that this readmission 
was due to Exacerbation of his chronic condition of CHF and noncompliance 
with prescribed treatment regimen.  Patient lives alone, independently, with 
family support through his brother, Sky Lynn, [phone].  Patient 
stated that his home is safe and has electricity and running water.  Patient 
stated that he has no problems paying for medications and he fills his 
medications at [facility].  CM discussed availability of home health, rehab 
services, and medical equipment.  Patient declined HHS, SNF, and DME but 
would like Inpatient rehab through [facility].  WILNER signed and placed in chart.  
Patient voiced no other needs at this time and is satisfied with DC plan.  
Transportation provider at discharge will be with Sky, his brother.  DC 
IMM delivered, explained, signed by the patient, and placed in chart.  
Signed form also left with the patient.  CM will continue to follow and will 
 
assist as needed with dc plans/needs.  
  
Appended by Mauro Narvaez on 2021 19:33 CDT:  
Correction to beginning of documentation:  CM met with patient to complete 
DC plan and to evaluate needs.  Patient stated that he readmitted because he 
"...felt hot and couldn't breath".  Patient stated that he was unable to 
obtain his medications and was not able to keep his follow up appointment.
 
 
DCP REVIEW SUMMARY
ANTICIPATED D/C DATE: 
EXPECTED LOS : 
CASE STATUS:  DCP Initiated
INITIAL REVIEW:  2021
INITIAL REVIEWER:   Mauro Narvaez
FINAL DISCHARGE DISPOSITION:  : 
FINAL REVIEWER:  
FINAL REVIEW DATE: 
 
  
 
DCP Focus Questions & Answers
 
DCP Evaluation
QUESTION: ANSWER
Patient and/or caregiver agree upon recommended discharge plan? : Yes
Family / Caregiver's ability to cope with chronic illness: : a. Adequate 
(ability to meet patient's medical needs, ensures patient attends medical 
appts.)
Patient's current cognitive status: : *Oriented to person, place, situation, 
time and present
Patient's ability to cope with chronic illness : d. No chronic illness
Patient gives permission to discuss discharge plans with:  (name, 
relationship and number) : brotherSky, [phone]
Does the patient have the ability to pay for or attain post discharge needs 
/ services? : Yes
Functional screen assessment: : Basic needs can adequately be met by self
Family / Caregiver's ability to cope with chronic illness: : a. Adequate 
(ability to meet patient's medical needs, ensures patient attends medical 
appts.)
Physical Status: : Independent with ADL's
Equipment needed for post hospitalization: : None
Is there a likelihood that the patient will require additional services to 
return to the preadmission environment? : No
Living Arrangements: : Home Alone with Support
Patient with capacity for self-care or can be cared for in same environment 
as prior to hospitalization? : Yes
Baseline cognitive status: : *Oriented to person, place, situation, time and 
present
Physical environment modification needed / anticipated for discharge: : No
Medication Management: : Patient states can read and understand medication 
labels
 
Medication Management: : Patient states can afford medications
Pharmacy name(s): : [facility]
Does Patient have transportation to get home and to follow-up medical 
appointments when discharged from the hospital? : Yes
Would patient like to participate in any Care Coordination programs (if 
applicable): : Not applicable
Does the patient have electricity at home? : Yes
Does the patient have running water in their house? : Yes
Equipment in use: : Other
Other Equipment comments: : [employer]IES
Equipment agency name and contact information: : [facility]
Mental health screen: : No mental health history
 
 
 
DCP Re-evaluation
QUESTION: ANSWER
Would patient like to participate in any Care Coordination programs (if 
applicable): : Not applicable
 
 
 
 
 
 
 
PATIENT:  RYANNE BOGGS
ENCOUNTER:  [id number]
MEDICAL RECORD#:  [id number]
ADMISSION DATE:  2021
DISCHARGE DATE:  2021
ATTENDING MD:  ZURDO FOSTER
:   1975-Dec-23
AGE:  45
MARITAL STATUS:   S
DC PLAN ID:  [id number]
 
FACILITY:   [facility]
PRINTED ON: 21  17:06  CT
 
 
 
 
 
 
 
 
**All edits/amendments must be made on the electronic document**
 
DICTATION DATE: 21     : SARWAT  21     
RPT#: [id number]                                DC DATE:21
                                               STATUS: DIS IN  
[facility]
[address] [place] [address]
***END OF REPORT***

## 2021-06-03 ENCOUNTER — HOSPITAL ENCOUNTER (INPATIENT)
Dept: HOSPITAL 84 - D.ER | Age: 46
LOS: 3 days | Discharge: HOME | DRG: 293 | End: 2021-06-06
Attending: FAMILY MEDICINE | Admitting: FAMILY MEDICINE
Payer: MEDICARE

## 2021-06-03 VITALS
BODY MASS INDEX: 24.91 KG/M2 | HEIGHT: 70 IN | WEIGHT: 174 LBS | WEIGHT: 174 LBS | BODY MASS INDEX: 24.91 KG/M2 | HEIGHT: 70 IN

## 2021-06-03 DIAGNOSIS — E11.65: ICD-10-CM

## 2021-06-03 DIAGNOSIS — I42.9: ICD-10-CM

## 2021-06-03 DIAGNOSIS — I50.23: ICD-10-CM

## 2021-06-03 DIAGNOSIS — Z91.19: ICD-10-CM

## 2021-06-03 DIAGNOSIS — I11.0: Primary | ICD-10-CM

## 2021-06-03 DIAGNOSIS — F15.10: ICD-10-CM

## 2021-06-03 LAB
ANION GAP SERPL CALC-SCNC: 14.6 MMOL/L (ref 8–16)
APTT BLD: 35.4 SECONDS (ref 22.8–39.4)
BASOPHILS NFR BLD AUTO: 2.3 % (ref 0–2)
BUN SERPL-MCNC: 17 MG/DL (ref 7–18)
CALCIUM SERPL-MCNC: 8.6 MG/DL (ref 8.5–10.1)
CHLORIDE SERPL-SCNC: 104 MMOL/L (ref 98–107)
CO2 SERPL-SCNC: 21.6 MMOL/L (ref 21–32)
CREAT SERPL-MCNC: 1.2 MG/DL (ref 0.6–1.3)
EOSINOPHIL NFR BLD: 4.5 % (ref 0–7)
ERYTHROCYTE [DISTWIDTH] IN BLOOD BY AUTOMATED COUNT: 20.5 % (ref 11.5–14.5)
GLUCOSE SERPL-MCNC: 82 MG/DL (ref 74–106)
HCT VFR BLD CALC: 36.2 % (ref 42–54)
HGB BLD-MCNC: 11.2 G/DL (ref 13.5–17.5)
INR PPP: 1.65 (ref 0.85–1.17)
LYMPHOCYTES # BLD: 1.3 10X3/UL (ref 1.32–3.57)
LYMPHOCYTES NFR BLD AUTO: 27 % (ref 15–50)
MCH RBC QN AUTO: 21.9 PG (ref 26–34)
MCHC RBC AUTO-ENTMCNC: 30.8 G/DL (ref 31–37)
MCV RBC: 71.1 FL (ref 80–100)
MONOCYTES NFR BLD: 13.7 % (ref 2–11)
NEUTROPHILS # BLD: 2.5 10X3/UL (ref 1.78–5.38)
NEUTROPHILS NFR BLD AUTO: 52.5 % (ref 40–80)
OSMOLALITY SERPL CALC.SUM OF ELEC: 272 MOSM/KG (ref 275–300)
PLATELET # BLD: 172 10X3/UL (ref 130–400)
PMV BLD AUTO: 8.7 FL (ref 7.4–10.4)
POTASSIUM SERPL-SCNC: 4.2 MMOL/L (ref 3.5–5.1)
PROTHROMBIN TIME: 18.1 SECONDS (ref 11.6–15)
RBC # BLD AUTO: 5.09 10X6/UL (ref 4.2–6.1)
SODIUM SERPL-SCNC: 136 MMOL/L (ref 136–145)
WBC # BLD AUTO: 4.7 10X3/UL (ref 4.8–10.8)

## 2021-06-03 NOTE — CN
PATIENT NAME:RYANNE BOGGS                             MEDICAL RECORD: [id number]
: 77                                              LOCATION:D. D.
ADMIT DATE: 21                                       ACCOUNT: [id number]
CONSULTING PHYSICIAN:    CLINTON JONES MD               
                                               
REFERRING PHYSICIAN:     KURT DAWSON MD           
 
 
DATE OF CONSULTATION:  2021
 
HISTORY OF PRESENT ILLNESS:  The patient is a 45-year-old -American male
with history of acute-on-chronic systolic CHF, hypertension, diabetes mellitus,
schizophrenia, methamphetamine abuse, who was admitted with CHF decompensated.
 
PHYSICAL EXAMINATION:
GENERAL:  Pleasant middle-aged black male, lying in no apparent distress.
VITAL SIGNS:  Blood pressure 100/50s, pulse 90s (regular).
HEENT:  Sclerae somewhat atypical.  Conjunctivae pink.
NECK:  Supple.  No appreciated JVD.
HEART:  He has got regular rhythm and rate, II/VI systolic murmur; PMI laterally
displaced.
LUNGS:  Clear bilaterally.
ABDOMEN:  Benign.
EXTREMITIES: Negative for edema.
NEUROLOGICAL:  Nonfocal.
 
MEDICATIONS:
1. Bumex 2 mg p.o. b.i.d.
2. Entresto 24/26 mg 1 tablet b.i.d.
3. Lovenox 40 mg subQ daily.
4. Potassium 20 mEq b.i.d.
5. Nicotine patch daily.
 
LABORATORY DATA:  Telemetry normal sinus rhythm in the 90s; episodes of runs of
nonsustained/sustained ventricular tachycardia, currently sinus rhythm.
 
DIAGNOSTIC STUDIES:  EKG, sinus tachycardia at 100 beats per minute.
 
ASSESSMENT AND PLAN:
1. Acute-on-chronic congestive heart failure -- systolic dysfunction --
compensated.
2. Diabetes mellitus.
3. Hypertension.
4. History of schizophrenia.
5. History of methamphetamine abuse.
6. Noncompliant.
 
PLAN:  Continue with current medical management at this time.  The patient may
be discharged with outpatient followup in cardiovascular clinic.
 
Thank you for allowing us to participate in the care of this patient.
 
TRANSINT:[id number] Voice Confirmation ID: [id number] DOCUMENT ID: [id number]
                                           
 
 
 
CONSULT REPORT                                 [id number]    RYANNE BOGGS MOSES MD               
 
 
 
 
 
 
 
 
 
 
 
 
 
 
 
 
 
 
 
 
 
 
 
 
 
 
 
 
 
 
 
 
 
 
 
 
 
 
 
 
 
 
 
 
 
 
CC:                                                             [id number]
DICTATION DATE: 21 1012     :     21 1052      ADM IN  
                                                                              
[place] [address]

## 2021-06-04 VITALS — SYSTOLIC BLOOD PRESSURE: 124 MMHG | DIASTOLIC BLOOD PRESSURE: 55 MMHG

## 2021-06-04 VITALS — SYSTOLIC BLOOD PRESSURE: 96 MMHG | DIASTOLIC BLOOD PRESSURE: 66 MMHG

## 2021-06-04 VITALS — SYSTOLIC BLOOD PRESSURE: 106 MMHG | DIASTOLIC BLOOD PRESSURE: 80 MMHG

## 2021-06-04 VITALS — DIASTOLIC BLOOD PRESSURE: 77 MMHG | SYSTOLIC BLOOD PRESSURE: 97 MMHG

## 2021-06-04 VITALS — DIASTOLIC BLOOD PRESSURE: 63 MMHG | SYSTOLIC BLOOD PRESSURE: 98 MMHG

## 2021-06-04 VITALS — SYSTOLIC BLOOD PRESSURE: 104 MMHG | DIASTOLIC BLOOD PRESSURE: 62 MMHG

## 2021-06-04 VITALS — SYSTOLIC BLOOD PRESSURE: 101 MMHG | DIASTOLIC BLOOD PRESSURE: 71 MMHG

## 2021-06-04 VITALS — SYSTOLIC BLOOD PRESSURE: 94 MMHG | DIASTOLIC BLOOD PRESSURE: 70 MMHG

## 2021-06-04 VITALS — SYSTOLIC BLOOD PRESSURE: 97 MMHG | DIASTOLIC BLOOD PRESSURE: 73 MMHG

## 2021-06-04 VITALS — SYSTOLIC BLOOD PRESSURE: 95 MMHG | DIASTOLIC BLOOD PRESSURE: 60 MMHG

## 2021-06-04 VITALS — DIASTOLIC BLOOD PRESSURE: 62 MMHG | SYSTOLIC BLOOD PRESSURE: 87 MMHG

## 2021-06-04 VITALS — DIASTOLIC BLOOD PRESSURE: 74 MMHG | SYSTOLIC BLOOD PRESSURE: 102 MMHG

## 2021-06-04 VITALS — SYSTOLIC BLOOD PRESSURE: 110 MMHG | DIASTOLIC BLOOD PRESSURE: 77 MMHG

## 2021-06-04 LAB
ALBUMIN SERPL-MCNC: 3.3 G/DL (ref 3.4–5)
ALP SERPL-CCNC: 111 U/L (ref 30–120)
ALT SERPL-CCNC: 24 U/L (ref 10–68)
AMPHETAMINES UR QL SCN: POSITIVE QUAL
BACTERIA #/AREA URNS HPF: (no result) HPF
BARBITURATES UR QL SCN: NEGATIVE QUAL
BENZODIAZ UR QL SCN: NEGATIVE QUAL
BILIRUB SERPL-MCNC: 0.97 MG/DL (ref 0.2–1.3)
BILIRUB SERPL-MCNC: NEGATIVE MG/DL
BZE UR QL SCN: NEGATIVE QUAL
CANNABINOIDS UR QL SCN: POSITIVE QUAL
CK MB SERPL-MCNC: 1.5 U/L (ref 0–3.6)
CK MB SERPL-MCNC: 2.3 U/L (ref 0–3.6)
CK SERPL-CCNC: 132 UL (ref 21–232)
CK SERPL-CCNC: 151 UL (ref 21–232)
GLOBULIN SER-MCNC: 4.5 G/L
GRAN CASTS #/AREA URNS LPF: (no result) LPF
KETONES UR STRIP-MCNC: NEGATIVE MG/DL
NITRITE UR-MCNC: NEGATIVE MG/ML
NT-PROBNP SERPL-MCNC: 5708 PG/ML (ref 0–125)
OPIATES UR QL SCN: NEGATIVE QUAL
PCP UR QL SCN: NEGATIVE QUAL
PH UR STRIP: 5 [PH] (ref 5–8)
PROT SERPL-MCNC: 7.8 G/DL (ref 6.4–8.2)
SARS-COV+SARS-COV-2 AG RESP QL IA.RAPID: (no result)
SQUAMOUS #/AREA URNS HPF: (no result) HPF (ref 0–4)
TROPONIN I SERPL-MCNC: < 0.017 NG/ML (ref 0–0.06)
TROPONIN I SERPL-MCNC: < 0.017 NG/ML (ref 0–0.06)
UROBILINOGEN UR-MCNC: NORMAL MG/DL (ref ?–2)
WBC #/AREA URNS HPF: (no result) HPF (ref 0–1)

## 2021-06-04 NOTE — NUR
PT RESTING IN POSITION OF COMFORT. DENIES CURRENT NEEDS. CALL LIGHT AND
BELONGINGS WITHIN REACH OF PT.

## 2021-06-04 NOTE — NUR
PT RESTING IN POSITION OF COMFORT. HE REMAINS ON ROOM AIR, AND ON CARDIAC
MONITOR, SINUS RHYTHM OF 93. BLANKET, WATER, ORANGE JUICE AND PILLOW PROVIDED
PER PT REQUEST. NAD NOTED.

## 2021-06-05 VITALS — SYSTOLIC BLOOD PRESSURE: 105 MMHG | DIASTOLIC BLOOD PRESSURE: 72 MMHG

## 2021-06-05 VITALS — SYSTOLIC BLOOD PRESSURE: 107 MMHG | DIASTOLIC BLOOD PRESSURE: 74 MMHG

## 2021-06-05 VITALS — SYSTOLIC BLOOD PRESSURE: 106 MMHG | DIASTOLIC BLOOD PRESSURE: 62 MMHG

## 2021-06-05 VITALS — SYSTOLIC BLOOD PRESSURE: 122 MMHG | DIASTOLIC BLOOD PRESSURE: 73 MMHG

## 2021-06-05 LAB
ALBUMIN SERPL-MCNC: 2.8 G/DL (ref 3.4–5)
ALP SERPL-CCNC: 97 U/L (ref 30–120)
ALT SERPL-CCNC: 21 U/L (ref 10–68)
ANION GAP SERPL CALC-SCNC: 12.6 MMOL/L (ref 8–16)
BASOPHILS NFR BLD AUTO: 1.5 % (ref 0–2)
BILIRUB SERPL-MCNC: 0.74 MG/DL (ref 0.2–1.3)
BUN SERPL-MCNC: 15 MG/DL (ref 7–18)
CALCIUM SERPL-MCNC: 8.1 MG/DL (ref 8.5–10.1)
CHLORIDE SERPL-SCNC: 105 MMOL/L (ref 98–107)
CO2 SERPL-SCNC: 24 MMOL/L (ref 21–32)
CREAT SERPL-MCNC: 1.1 MG/DL (ref 0.6–1.3)
EOSINOPHIL NFR BLD: 3.7 % (ref 0–7)
ERYTHROCYTE [DISTWIDTH] IN BLOOD BY AUTOMATED COUNT: 20 % (ref 11.5–14.5)
GLOBULIN SER-MCNC: 4.1 G/L
GLUCOSE SERPL-MCNC: 130 MG/DL (ref 74–106)
HCT VFR BLD CALC: 30.7 % (ref 42–54)
HGB BLD-MCNC: 9.8 G/DL (ref 13.5–17.5)
LYMPHOCYTES NFR BLD AUTO: 11.8 % (ref 15–50)
MAGNESIUM SERPL-MCNC: 1.5 MG/DL (ref 1.8–2.4)
MCH RBC QN AUTO: 22.5 PG (ref 26–34)
MCHC RBC AUTO-ENTMCNC: 32 G/DL (ref 31–37)
MCV RBC: 70.3 FL (ref 80–100)
MONOCYTES NFR BLD: 13.2 % (ref 2–11)
NEUTROPHILS # BLD: 3 10X3/UL (ref 1.78–5.38)
NEUTROPHILS NFR BLD AUTO: 69.8 % (ref 40–80)
OSMOLALITY SERPL CALC.SUM OF ELEC: 278 MOSM/KG (ref 275–300)
PLATELET # BLD: 148 10X3/UL (ref 130–400)
PMV BLD AUTO: 9 FL (ref 7.4–10.4)
POTASSIUM SERPL-SCNC: 3.6 MMOL/L (ref 3.5–5.1)
PROT SERPL-MCNC: 6.9 G/DL (ref 6.4–8.2)
RBC # BLD AUTO: 4.36 10X6/UL (ref 4.2–6.1)
SODIUM SERPL-SCNC: 138 MMOL/L (ref 136–145)
WBC # BLD AUTO: 4.3 10X3/UL (ref 4.8–10.8)

## 2021-06-06 VITALS — DIASTOLIC BLOOD PRESSURE: 69 MMHG | SYSTOLIC BLOOD PRESSURE: 103 MMHG

## 2021-06-06 VITALS — DIASTOLIC BLOOD PRESSURE: 54 MMHG | SYSTOLIC BLOOD PRESSURE: 99 MMHG

## 2021-06-06 LAB
ALBUMIN SERPL-MCNC: 2.9 G/DL (ref 3.4–5)
ALP SERPL-CCNC: 107 U/L (ref 30–120)
ALT SERPL-CCNC: 16 U/L (ref 10–68)
ANION GAP SERPL CALC-SCNC: 12.6 MMOL/L (ref 8–16)
BASOPHILS NFR BLD AUTO: 0.6 % (ref 0–2)
BILIRUB SERPL-MCNC: 0.76 MG/DL (ref 0.2–1.3)
BUN SERPL-MCNC: 14 MG/DL (ref 7–18)
CALCIUM SERPL-MCNC: 8.7 MG/DL (ref 8.5–10.1)
CHLORIDE SERPL-SCNC: 103 MMOL/L (ref 98–107)
CO2 SERPL-SCNC: 26.8 MMOL/L (ref 21–32)
CREAT SERPL-MCNC: 1.1 MG/DL (ref 0.6–1.3)
EOSINOPHIL NFR BLD: 9.2 % (ref 0–7)
ERYTHROCYTE [DISTWIDTH] IN BLOOD BY AUTOMATED COUNT: 21.2 % (ref 11.5–14.5)
GLOBULIN SER-MCNC: 4.4 G/L
GLUCOSE SERPL-MCNC: 112 MG/DL (ref 74–106)
HCT VFR BLD CALC: 38.1 % (ref 42–54)
HGB BLD-MCNC: 11.9 G/DL (ref 13.5–17.5)
LYMPHOCYTES # BLD: 0.5 10X3/UL (ref 1.32–3.57)
LYMPHOCYTES NFR BLD AUTO: 12.9 % (ref 15–50)
MAGNESIUM SERPL-MCNC: 1.6 MG/DL (ref 1.8–2.4)
MCH RBC QN AUTO: 22 PG (ref 26–34)
MCHC RBC AUTO-ENTMCNC: 31.3 G/DL (ref 31–37)
MCV RBC: 70.4 FL (ref 80–100)
MONOCYTES NFR BLD: 15.1 % (ref 2–11)
NEUTROPHILS # BLD: 2.4 10X3/UL (ref 1.78–5.38)
NEUTROPHILS NFR BLD AUTO: 62.2 % (ref 40–80)
OSMOLALITY SERPL CALC.SUM OF ELEC: 279 MOSM/KG (ref 275–300)
PLATELET # BLD: 177 10X3/UL (ref 130–400)
PMV BLD AUTO: 9 FL (ref 7.4–10.4)
POTASSIUM SERPL-SCNC: 3.4 MMOL/L (ref 3.5–5.1)
PROT SERPL-MCNC: 7.3 G/DL (ref 6.4–8.2)
RBC # BLD AUTO: 5.42 10X6/UL (ref 4.2–6.1)
SODIUM SERPL-SCNC: 139 MMOL/L (ref 136–145)
WBC # BLD AUTO: 3.8 10X3/UL (ref 4.8–10.8)

## 2021-06-06 NOTE — NUR
RECIEVE REPORT. RESTING IN BED WITH EYES CLOSED. AROUSES EASILY TO STIMULI.
DENIES ANY NEEDS. REPLACE Mg AND K PER ELECTROLYTE PROTOCOL.

## 2021-06-06 NOTE — NUR
ALERT AND ORIENTED X4. SITTING UP IN BED. DC RT FA IV TIP INTACT. DISCHARGE
INSTRUCTIONS GIVEN VERBALLY AND WRITTEN. DISCHARGE PAPERS SIGNED ON CHART.
REMAINS FREE FROM INJURY.

## 2021-06-06 NOTE — MORECARE
CASE MANAGEMENT DISCHARGE SUMMARY
 
 
PATIENT: RYANNE BOGGS                   UNIT: [id number]
ACCOUNT#: [id number]                       ADM DATE: 21
AGE: 45     : 77  SEX: M            ROOM/BED: D.2120    
AUTHOR: CUONG,DOC                             PHYSICIAN:                               
 
REFERRING PHYSICIAN: KURT DAWSON MD           
DATE OF SERVICE: 21
Case Management Discharge Planning Summary
 
 
COMMENTS 
ENTERED DATE:  21  18:28 CT
COMMENT TYPE:  Discharge Planning
REVIEWER: Mauro Narvaez
CM met with patient to complete DC plan and to evaluate needs.  Patient 
lives alone with strong support and stated that his person to notify is his 
brother, Sky Lynn, [phone].  Patient stated that his home is safe and 
has electricity and running water.  Patient stated that he has no problems 
paying for medications and he fills his medications at [facility].  
At discharge, the patient plans to return home and feels this is a safe 
discharge.  CM discussed availability of home health, rehab services, and 
medical equipment.  Patient declined HHS, SNF, IPR, and DME.  Patient voiced 
no other needs at this time and is satisfied with DC plan.  DC IMM delivered,
 explained, signed by the patient, and placed in chart.  Signed form also 
left with the patient.  CM will continue to follow and will assist as needed 
with dc plans/needs.
 
 
DCP REVIEW SUMMARY
ANTICIPATED D/C DATE: 2021
EXPECTED LOS : 2
CASE STATUS:  DCP Initiated
INITIAL REVIEW:  2021
INITIAL REVIEWER:   Mauro Narvaez
FINAL DISCHARGE DISPOSITION:  : 
FINAL REVIEWER:  
FINAL REVIEW DATE: 
 
  
 
DCP Focus Questions & Answers
 
DCP Evaluation
QUESTION: ANSWER
Patient and/or caregiver agree upon recommended discharge plan? : Yes
Patient's current cognitive status: : *Oriented to person, place, situation, 
time and present
 
Patient's ability to cope with chronic illness : d. No chronic illness
Patient gives permission to discuss discharge plans with:  (name, 
relationship and number) : brotherSky, [phone]
Family / Caregiver's ability to cope with chronic illness: : a. Adequate 
(ability to meet patient's medical needs, ensures patient attends medical 
appts.)
Does the patient have the ability to pay for or attain post discharge needs 
/ services? : Yes
Functional screen assessment: : Basic needs can adequately be met by self
Family / Caregiver's ability to cope with chronic illness: : a. Adequate 
(ability to meet patient's medical needs, ensures patient attends medical 
appts.)
Physical Status: : Independent with ADL's
Equipment needed for post hospitalization: : None
Is there a likelihood that the patient will require additional services to 
return to the preadmission environment? : No
Living Arrangements: : Home Alone with Support
Patient with capacity for self-care or can be cared for in same environment 
as prior to hospitalization? : Yes
Baseline cognitive status: : *Oriented to person, place, situation, time and 
present
Physical environment modification needed / anticipated for discharge: : No
Medication Management: : Patient states can afford medications
Medication Management: : Patient states can read and understand medication 
labels
Pharmacy name(s): : [facility]
Does Patient have transportation to get home and to follow-up medical 
appointments when discharged from the hospital? : Yes
Would patient like to participate in any Care Coordination programs (if 
applicable): : Not applicable
Does the patient have electricity at home? : Yes
Does the patient have running water in their house? : Yes
Equipment in use: : None
Mental health screen: : No mental health history
 
 
 
DCP Re-evaluation
QUESTION: ANSWER
Would patient like to participate in any Care Coordination programs (if 
applicable): : Not applicable
 
 
 
 
 
 
 
PATIENT:  RYANNE BOGGS
ENCOUNTER:  [id number]
MEDICAL RECORD#:  [id number]
ADMISSION DATE:  2021
 
DISCHARGE DATE:  2021
ATTENDING MD:  KURT ORDOÑEZ
:   1975-Dec-23
AGE:  45
MARITAL STATUS:   S
DC PLAN ID:  [id number]
FACILITY:   [facility]
PRINTED ON: 21  18:33  CT
 
 
 
 
 
 
 
 
**All edits/amendments must be made on the electronic document**
 
DICTATION DATE: 21     : SARWAT  21     
RPT#: [id number]                                DC DATE:21
                                               STATUS: DIS IN  
[facility]
 [place] [address]
***END OF REPORT***

## 2021-06-07 NOTE — [facility]
CASE MANAGEMENT DISCHARGE SUMMARY
 
 
PATIENT: RYANNE BOGGS                   UNIT: [id number]
ACCOUNT#: [id number]                       ADM DATE: 21
AGE: 45     : [birth year]  SEX: M            ROOM/BED: D.2120    
AUTHOR: CUONG,DOC                             PHYSICIAN:                               
 
REFERRING PHYSICIAN: KURT DAWSON MD           
DATE OF SERVICE: 21
Case Management Discharge Planning Summary
 
 
COMMENTS 
ENTERED DATE:  21  18:28 CT
COMMENT TYPE:  Discharge Planning
REVIEWER: Mauro Narvaez
CM met with patient to complete DC plan and to evaluate needs.  Patient 
lives alone with strong support and stated that his person to notify is his 
brother, Sky Lynn, [phone].  Patient stated that his home is safe and 
has electricity and running water.  Patient stated that he has no problems 
paying for medications and he fills his medications at [facility].  
At discharge, the patient plans to return home and feels this is a safe 
discharge.  CM discussed availability of home health, rehab services, and 
medical equipment.  Patient declined HHS, SNF, IPR, and DME.  Patient voiced 
no other needs at this time and is satisfied with DC plan.  DC IMM delivered,
 explained, signed by the patient, and placed in chart.  Signed form also 
left with the patient.  CM will continue to follow and will assist as needed 
with dc plans/needs.
 
 
DCP REVIEW SUMMARY
ANTICIPATED D/C DATE: 2021
EXPECTED LOS : 2
CASE STATUS:  DCP Initiated
INITIAL REVIEW:  2021
INITIAL REVIEWER:   Mauro Narvaez
FINAL DISCHARGE DISPOSITION:  : 
FINAL REVIEWER:  
FINAL REVIEW DATE: 
 
  
 
DCP Focus Questions & Answers
 
DCP Evaluation
QUESTION: ANSWER
Patient and/or caregiver agree upon recommended discharge plan? : Yes
Family / Caregiver's ability to cope with chronic illness: : a. Adequate 
(ability to meet patient's medical needs, ensures patient attends medical 
 
appts.)
Patient's current cognitive status: : *Oriented to person, place, situation, 
time and present
Patient's ability to cope with chronic illness : d. No chronic illness
Patient gives permission to discuss discharge plans with:  (name, 
relationship and number) : brotherSky, [phone]
Does the patient have the ability to pay for or attain post discharge needs 
/ services? : Yes
Functional screen assessment: : Basic needs can adequately be met by self
Family / Caregiver's ability to cope with chronic illness: : a. Adequate 
(ability to meet patient's medical needs, ensures patient attends medical 
appts.)
Physical Status: : Independent with ADL's
Equipment needed for post hospitalization: : None
Is there a likelihood that the patient will require additional services to 
return to the preadmission environment? : No
Living Arrangements: : Home Alone with Support
Patient with capacity for self-care or can be cared for in same environment 
as prior to hospitalization? : Yes
Baseline cognitive status: : *Oriented to person, place, situation, time and 
present
Physical environment modification needed / anticipated for discharge: : No
Medication Management: : Patient states can read and understand medication 
labels
Medication Management: : Patient states can afford medications
Pharmacy name(s): : [facility]
Does Patient have transportation to get home and to follow-up medical 
appointments when discharged from the hospital? : Yes
Would patient like to participate in any Care Coordination programs (if 
applicable): : Not applicable
Does the patient have electricity at home? : Yes
Does the patient have running water in their house? : Yes
Equipment in use: : None
Mental health screen: : No mental health history
 
 
 
DCP Re-evaluation
QUESTION: ANSWER
Would patient like to participate in any Care Coordination programs (if 
applicable): : Not applicable
 
 
 
 
 
 
 
PATIENT:  RYANNE BOGGS
ENCOUNTER:  [id number]
MEDICAL RECORD#:  [id number]
ADMISSION DATE:  2021
 
DISCHARGE DATE:  2021
ATTENDING MD:  KRUT ORDOÑEZ
:   1975-Dec-23
AGE:  45
MARITAL STATUS:   S
DC PLAN ID:  [id number]
FACILITY:   [facility]
PRINTED ON: 21  16:14  CT
 
 
 
 
 
 
 
 
**All edits/amendments must be made on the electronic document**
 
DICTATION DATE: 21     : SARWAT  21 1614     
RPT#: [id number]                                DC DATE:21
                                               STATUS: DIS IN  
[facility]
[address] [place] [address]
***END OF REPORT***

## 2024-04-24 NOTE — NUR
BEDSIDE REPORT HANDED OFF VIA SBAR FROM ELADIA THOMPSON. RESPIRATORY AT BEDSIDE FOR
BREATHING TREATMENT. PT STABLE AT TIME. Nausea and vomiting that does not stop/Inability to tolerate liquids or foods/Increased irritability or sluggishness